# Patient Record
Sex: MALE | Race: WHITE | Employment: FULL TIME | URBAN - METROPOLITAN AREA
[De-identification: names, ages, dates, MRNs, and addresses within clinical notes are randomized per-mention and may not be internally consistent; named-entity substitution may affect disease eponyms.]

---

## 2019-01-17 LAB
CHOLESTEROL, TOTAL: 196 MG/DL
CHOLESTEROL/HDL RATIO: 4.4
CREATININE: 1 MG/DL
HDLC SERPL-MCNC: 45 MG/DL (ref 35–70)
LDL CHOLESTEROL CALCULATED: 131 MG/DL (ref 0–160)
POTASSIUM (K+): 4.1
TRIGL SERPL-MCNC: 101 MG/DL
VLDLC SERPL CALC-MCNC: NORMAL MG/DL

## 2019-02-01 ENCOUNTER — HOSPITAL ENCOUNTER (OUTPATIENT)
Age: 59
Discharge: HOME | End: 2019-02-03
Payer: COMMERCIAL

## 2019-02-01 LAB — PROSTATE SPECIFIC ANTIGEN: 3.32 NG/ML (ref 0–4)

## 2019-02-01 PROCEDURE — G0103 PSA SCREENING: HCPCS

## 2019-05-06 VITALS
TEMPERATURE: 98 F | BODY MASS INDEX: 34.36 KG/M2 | WEIGHT: 232 LBS | HEIGHT: 69 IN | SYSTOLIC BLOOD PRESSURE: 112 MMHG | DIASTOLIC BLOOD PRESSURE: 72 MMHG

## 2019-05-06 RX ORDER — SILDENAFIL CITRATE 20 MG/1
20 TABLET ORAL PRN
COMMUNITY
End: 2020-07-01 | Stop reason: ALTCHOICE

## 2019-05-06 RX ORDER — OMEPRAZOLE 40 MG/1
40 CAPSULE, DELAYED RELEASE ORAL DAILY
COMMUNITY
End: 2020-06-25 | Stop reason: SDUPTHER

## 2019-05-06 RX ORDER — ALBUTEROL SULFATE 90 UG/1
2 AEROSOL, METERED RESPIRATORY (INHALATION) EVERY 6 HOURS PRN
COMMUNITY
End: 2019-05-09

## 2019-05-06 RX ORDER — BENZONATATE 100 MG/1
100 CAPSULE ORAL 3 TIMES DAILY PRN
COMMUNITY
End: 2019-05-09

## 2019-05-06 RX ORDER — DULOXETIN HYDROCHLORIDE 60 MG/1
60 CAPSULE, DELAYED RELEASE ORAL DAILY
COMMUNITY
End: 2019-05-09

## 2019-05-06 RX ORDER — CYCLOBENZAPRINE HCL 10 MG
10 TABLET ORAL 3 TIMES DAILY PRN
COMMUNITY
End: 2019-05-09

## 2019-05-08 ENCOUNTER — TELEPHONE (OUTPATIENT)
Dept: PRIMARY CARE CLINIC | Age: 59
End: 2019-05-08

## 2019-05-09 ENCOUNTER — PROCEDURE VISIT (OUTPATIENT)
Dept: PODIATRY | Age: 59
End: 2019-05-09
Payer: COMMERCIAL

## 2019-05-09 ENCOUNTER — OFFICE VISIT (OUTPATIENT)
Dept: PRIMARY CARE CLINIC | Age: 59
End: 2019-05-09
Payer: COMMERCIAL

## 2019-05-09 VITALS
SYSTOLIC BLOOD PRESSURE: 128 MMHG | HEART RATE: 60 BPM | DIASTOLIC BLOOD PRESSURE: 64 MMHG | BODY MASS INDEX: 32.19 KG/M2 | WEIGHT: 218 LBS | OXYGEN SATURATION: 99 %

## 2019-05-09 VITALS
HEIGHT: 69 IN | WEIGHT: 221 LBS | BODY MASS INDEX: 32.73 KG/M2 | DIASTOLIC BLOOD PRESSURE: 70 MMHG | SYSTOLIC BLOOD PRESSURE: 102 MMHG | TEMPERATURE: 97.3 F

## 2019-05-09 DIAGNOSIS — M79.674 PAIN IN TOE OF RIGHT FOOT: ICD-10-CM

## 2019-05-09 DIAGNOSIS — Z00.00 HEALTH MAINTENANCE EXAMINATION: ICD-10-CM

## 2019-05-09 DIAGNOSIS — K21.9 GASTROESOPHAGEAL REFLUX DISEASE WITHOUT ESOPHAGITIS: Primary | ICD-10-CM

## 2019-05-09 DIAGNOSIS — E55.9 VITAMIN D DEFICIENCY: ICD-10-CM

## 2019-05-09 DIAGNOSIS — M79.675 PAIN IN TOE OF LEFT FOOT: ICD-10-CM

## 2019-05-09 DIAGNOSIS — B35.1 TINEA UNGUIUM: ICD-10-CM

## 2019-05-09 DIAGNOSIS — L60.0 INGROWN NAIL: Primary | ICD-10-CM

## 2019-05-09 DIAGNOSIS — F34.1 DYSTHYMIA: ICD-10-CM

## 2019-05-09 DIAGNOSIS — E78.2 MIXED HYPERLIPIDEMIA: ICD-10-CM

## 2019-05-09 DIAGNOSIS — R73.9 HYPERGLYCEMIA: ICD-10-CM

## 2019-05-09 DIAGNOSIS — N42.9 DISORDER OF PROSTATE: ICD-10-CM

## 2019-05-09 PROCEDURE — 99214 OFFICE O/P EST MOD 30 MIN: CPT | Performed by: FAMILY MEDICINE

## 2019-05-09 PROCEDURE — 99212 OFFICE O/P EST SF 10 MIN: CPT | Performed by: PODIATRIST

## 2019-05-09 NOTE — PROGRESS NOTES
Subjective  CC: Patient presents with hammertoe deformity of the feet, fungal toenails; pain on ambulation, limitation  of shoe gear and for podiatric care. HPI: Toe deformity and bilateral toenail fungus. Bilateral toenail fungus: Present for years. Occurred suddenly. Onset of symptoms occurred on:  New occurrence of symptoms. Rated as moderate in severity. No new activities. Pain with all styles  of shoes. (Assoc Sx). ROS:  Const: Denies constitutional symptoms. Eyes: Denies eye symptoms. Musculo: Denies musculoskeletal symptoms. Skin: Denies skin, hair and nail symptoms. Current Meds: Cymbalta 60 mg 1 by mouth every day  Terbinafine  mg 1 by mouth every day  Cyclobenzaprine HCL 10 mg 1/2 - 1 by mouth every 8 hours as needed  Sildenafil Citrate 20 mg 1- 2 (1/2-4hrs) prior to sexual activity-max 1 dose day  Ciclodan 8 % apply to toe nails every day  Prilosec 40 mg 1 by mouth every day  Benzonatate 100 mg 1 by mouth 3 times a day  Ventolin  (90 Base) mcg/Act take 2 puffs inhaled every 4-6 hours as needed  Allergies: NKDA  PMH:  Health Maintenance:  Influenza Vaccination - (2017)  Dr Wilfred Shaffer - nl exam   Medical Problems:  Ulcers - Beth Jarquin. Has had EGD  (3 ulcers, erosion of stomach). repeat . repeat   Last colonoscopy , ,; nl. had , nl. GERD  migraines - follows with Dr Maggi Rogers; typically around allergy season. started age 25. cervical DDD  overactive bladder - Dr Adelaide Arredondo spur - Dr Italia Rivera; surgery  Had measles/chicken pox  [facility]  1960 Page #2  Surgical Hx:  Glenny  Hernia repair - Rt ; Dr Joaquin Lob  Vasectomy, Vicky Sol, no changes. FH:  Father:  Non Contributory. Mother:  Non Contributory. Brother 1 yr younger chron's  Dad - CHF  71 heavy smoker  Mom - healthy  Reviewed, no changes. SH:  Marital:  - x2, getting . limited caffeine.   own [place]  Personal Habits: Cigarette Use: Does Not Smoke - occassional cigar. Alcohol: Drinks Alcoholic  Beverages Rarely. Reviewed, no changes. Objective  BP: 112/72 T: 98.0 Ht: 69\" 5'9\" Ht cm: 175.3 Wt: 232lb Wt k.235 BMI: 34.3 BSA: 2.20  Exam:  Const: Appears well. No signs of apparent distress present. Speech is clear and appropriate for age. Overall behavior is appropriate. CV: Pedal pulses: 2+ and equal bilaterally. Extremities: No edema of the lower limbs bilaterally. No  varicosities noted. No venous insufficiency noted. Capillary refill is < 2 seconds. Temperature is  warm and equal bilaterally. Musculo: Walks with a normal gait for age. Knees:  Insp/Palp: Normal to inspection and palpation. ROM: Symmetric. Calves:  Insp/Palp: . (Insp/Palp)  Ankles:  Insp/Palp: Normal to inspection and palpation. ROM: Symmetric. Feet:  ROM: Symmetric. Toes:  Insp/Palp: Great toes normal to inspection and palpation. Second toes normal to inspection  and palpation. Third toes normal to inspection and palpation. Fourth toes normal to inspection  and palpation. Fifth toes normal to inspection and palpation. Webspaces normal to inspection  and palpation. No toe ulcerations bilaterally. ROM: Symmetric. Skin: Skin warm and dry with no evidence of unusual rashes or suspicious lesions. Skin normal to  palpation overall. Abnormal curvature, adherence to matrix, crumbling upon debridement of the medial aspect of the  toenails bilaterally; discoloration of the medial aspect of the toenails bilaterally, dystrophy, elongation and  fungus of the medial aspect of the toenails bilaterally. Neuro: Epicritic sensation is intact. Proprioception is intact. Reflexes: Achilles and patellar DTRs are brisk and symmetrical. Tinel's sign is negative bilaterally. [place] Camel  1960 Page #3  Anastacia Schanz sign is negative bilaterally.   Lab Acquired: 19  Test Name Result H/L Ref Range Units  CBC W/ DIFF & PLT -- Profile --  WBC 6.7 3.8-10.8  THOUS/MCL  RBC 4.90 4. 20-5.80  MILL/MCL  HEMOGLOBIN 14.5 13.2-17.1 G/DL  HEMATOCRIT 43.8 38.5-50.0 %  MCV 89.5 80.0-100.0 FL  MCH 29.7 27.0-33.0 PG  MCHC 33.2 32.0-36.0 G/DL  RDW 13.9 11.0-15.0 %  PLATELET COUNT 937 502-033  THOUS/MCL  MPV 9.3 7.5-12.5 FL  NEUTROPHILS,ABSOLUTE 3700 4526-3600  CELLS/MCL  LYMPHOCYTES,ABSOLUTE [phone]  CELLS/MCL  MONOCYTES,ABSOLUTE 700 200-950  CELLS/MCL  EOSINOPHILS,ABSOLUTE 400   CELLS/MCL  BASOPHILS,ABSOLUTE 0 0-200  CELLS/MCL  TOTAL NEUTROPHILS,% 55 40-75 %  TOTAL LYMPHOCYTES,% 29 12-47 %  MONOCYTES,% 10 4-12 %  EOSINOPHILS,% 5 H 0-4 %  BASOPHILS,% 1 0-1 %  Relative blood cell counts (%) should be compared with  absolute cell counts (cells/mcL). Relative counts may not  be clinically meaningful if the absolute count of one or  more cell type is decreased. Reference ranges for relative cell counts derived from:  A Manual of Laboratory and Diagnostics Tests, 9th Ed,  [address] [place] [address], 2015. Pediatric Reference Intervals, 7th Ed, [facility], 2011. COMP METABOLIC PANEL W/GFR -- Profile --  SODIUM 139 135-146 MMOL/L  POTASSIUM 4.1 3.5-5.3 MMOL/L  CHLORIDE 103  MMOL/L  CARBON DIOXIDE 29 20-32 MMOL/L  Reference range for high altitude clients: 18-30 mmol/L  CALCIUM 9.3 8.6-10.3 MG/DL  ALKALINE PHOSPHATASE 54  U/L  AST 17 10-35 U/L  ALT 17 9-46 U/L  Usha Kimbrough [facility] 1960 Page #4  BILIRUBIN,TOTAL 0.8 0.2-1.2 MG/DL  GLUCOSE 108 H 65-99 MG/DL  GLUCOSE REFERENCE RANGE BASED ON FASTING SPECIMEN. UREA NITROGEN (BUN) 26 H 7-25 MG/DL  CREATININE 0.96 0.70-1.25 MG/DL  The upper reference limit for Creatinine is approximately  13% higher for people identified as -American. BUN/CREATININE RATIO 26.6 H 6-22  PROTEIN,TOTAL 6.8 6.1-8.1 G/DL  ALBUMIN 4.4 3.6-5.1 G/DL  GLOBULIN,CALCULATED 2.4 1.9-3.7 G/DL  A/G RATIO 1.8 1.0-2.5  EGFR NON-AFR.  AMERICAN 86 > OR = 60  ML/MIN/1.73M2  EGFR AFRICAN AMERICAN 99 > OR = 60  ML/MIN/1.73M2  TSH 1.45 0.40-4.50 mIU/L  LIPID PANEL -- Profile --  CHOLESTEROL 196 <199 MG/DL  HDL CHOLESTEROL 45 >40 MG/DL  TRIGLYCERIDES 101 <150 MG/DL  NON-HDL CHOLESTEROL 151 H <130 MG/DL  For patients with diabetes plus 1 major ASCVD risk factor,  treating to a non-HDL-C goal of <100 mg/dL (LDL-C of <70  mg/ dL) is considered a therapeutic option. CHOLESTEROL/HDL RATIO 4.4 <5.0 CALC  LDL CHOL,CALCULATED 131 H 0-100 MG/DL  LDL-C is now calculated using the Miguel A  calculation, which is a validated novel method providing  better accuracy than the Friedewald equation in the  estimation of LDL-C. Juan Diego MONTEZ et al.MICHEAL. 4365;889(13):7267-2708  Desirable range <100 mg/dL for primary prevention; <70  mg/dL for patients with CHD or diabetic patients  with >or= 2 CHD risk factors. For additional information, please refer to  http://education. [employer]/faq/DOD580(This link  is being provided for informational/educational purposes  only.)  URINALYSIS -- Profile --  COLOR YELLOW YELLOW  APPEARANCE SL CLOUDY A CLEAR  SPECIFIC GRAVITY 1.024 1.001-1.035  PH 6.5 5.0-8.0  GLUCOSE,QUAL NEGATIVE NEGATIVE  BILIRUBIN NEGATIVE NEGATIVE  KETONES NEGATIVE NEGATIVE  HEMOGLOBIN,QUAL NEGATIVE NEGATIVE  PROTEIN,TOTAL,QUAL NEGATIVE NEGATIVE  NITRITE NEGATIVE NEGATIVE  LEUKOCYTE ESTERASE NEGATIVE NEGATIVE  Radha Mondragon  1960 Page #5  WBC/HPF CELLS NONE SEEN 0-5  RBC/HPF CELLS NONE SEEN 0-2  SQUAMOUS EPI CELLS NONE SEEN 0-5 HPF  BACTERIA NONE SEEN NONE SEEN  HYALINE CASTS NONE SEEN NONE SEEN LPF  PSA,TOTAL 2.7 < OR = 4.0 NG/ML  The total PSA value from this assay system is standardized  against the WHO standard. The test result will be  approximately 20% lower when compared to the  equimolar-standardized total PSA (Tara Occoquan). Comparison of serial PSA results should be interpreted  with this fact in mind. This test was performed using the Siemens chemiluminescent  method. Values obtained from different assay methods  be used interchangeably.  PSA levels,

## 2019-05-09 NOTE — PROGRESS NOTES
measles/chicken pox  [facility]  Surgical Hx:  Glenny  Hernia repair - Rt ; Dr Sunil Presley  Vasectomy, T & A  Reviewed, no changes. FH:  Father:  Non Contributory. Mother:  Non Contributory. Brother 1 yr younger chron's  Dad - CHF  71 heavy smoker  Mom - healthy  Reviewed, no changes. SH:  Marital:  - x2, getting . limited caffeine. own [place]  Personal Habits: Cigarette Use: Does Not Smoke - occassional cigar. Alcohol: Drinks Alcoholic  Beverages Rarely. Reviewed, no changes. O:  Vitals:    19 0909   BP: 128/64   Pulse: 60   SpO2: 99%   Weight: 218 lb (98.9 kg)     Exam:  Const: Appears comfortable. No signs of acute distress present. Head/Face: Atraumatic, normocephalic on inspection. Eyes: EOMI in both eyes. No discharge from the eyes. PERRL. Sclerae clear. ENMT: Auditory canals normal. Tympanic membranes: intact and no effusion bilaterally. External nose  WNL. Nasal mucosa is clear. Oropharynx: No erythema or exudate. Posterior pharynx is normal.  Neck: Supple. Palpation reveals no adenopathy. No masses appreciated. No JVD. Carotids: no  bruits bilaterally. Resp: Respirations are unlabored. Clear to auscultation bilaterally. No rales, rhonchi or wheezes  appreciated over the lungs bilaterally. CV: Rate is regular. Rhythm is regular. No gallop or rubs. Extremities: No clubbing or cyanosis. No edema of the lower limbs bilaterally. No calf inflammation or tenderness  Abdomen: Bowel sounds are normoactive. Abdomen is soft, nontender, and nondistended. No  abdominal masses palpable. No palpable hepatosplenomegaly. Lymph: No palpable or visible regional lymphadenopathy. Musculo: Walks with a normal gait. Upper Extremities: Normal to inspection and palpation. Skin: Dry and warm with no rash. Skin normal to inspection and palpation overall. Neuro: Alert and oriented. Affect: appropriate. Upper Extremities: motor strength is 5/5 bilaterally.   Lower Extremities: motor strength is 5/5 bilaterally. Reflexes: DTR's are symmetric and 2+ bilaterally. .  (Tandem Walk) . (Romberg's Test)  Cranial Nerves: Cranial nerves grossly intact. He defers /Rectal to [place]      1. Gastroesophageal reflux disease without esophagitis  Patient completely asymptomatic, follows with Dr. Susi Rinne. Taking PPI with risks benefits reviewed including masking underlying etiologies, risk of calcium else works and renal insufficiency, C. diff and otherwise. Had endoscopy about one year ago. 2. Dysthymia  Stable and asymptomatic on medication. Tolerating well. Undergoing counseling as well. 3. Disorder of prostate  A symptomatically. Following with Dr. Corina Ferrer, recently saw. PSA went from 2.1 2.7-defers exam.    4. Health maintenance examination  Encourage yearly physicals. He will schedule for this about 3 months  - HEMOGLOBIN A1C; Future    5. Vitamin D deficiency  Minimally deficient at about 24. Appropriate supplementation reviewed. 6. Mixed hyperlipidemia  Mildly elevated. Recheck panel.  - Lipid Panel; Future     7. Hyperglycemia  Mild at 108. Repeat. Likely much better after lifestyle modification. Monitor  - HEMOGLOBIN A1C; Future      Plan as above. Follow-up in about 3 months for physical sooner when necessary. Check with insurance before any testing. Signs and symptoms to watch for discussed. Serious signs and symptoms reviewed. ER if any.

## 2019-05-10 ENCOUNTER — HOSPITAL ENCOUNTER (OUTPATIENT)
Age: 59
Discharge: HOME | End: 2019-05-12

## 2019-05-10 DIAGNOSIS — Z00.00 HEALTH MAINTENANCE EXAMINATION: ICD-10-CM

## 2019-05-10 DIAGNOSIS — R73.9 HYPERGLYCEMIA: ICD-10-CM

## 2019-05-10 DIAGNOSIS — E78.2 MIXED HYPERLIPIDEMIA: ICD-10-CM

## 2019-05-10 LAB
CHOLESTEROL, TOTAL: 153 MG/DL (ref 0–199)
HBA1C MFR BLD: 5.4 % (ref 4–5.6)
HDLC SERPL-MCNC: 45 MG/DL
LDL CHOLESTEROL CALCULATED: 96 MG/DL (ref 0–99)
TRIGL SERPL-MCNC: 59 MG/DL (ref 0–149)
VLDLC SERPL CALC-MCNC: 12 MG/DL

## 2019-05-10 PROCEDURE — 83036 HEMOGLOBIN GLYCOSYLATED A1C: CPT

## 2019-05-10 PROCEDURE — 36415 COLL VENOUS BLD VENIPUNCTURE: CPT

## 2019-05-10 PROCEDURE — 80061 LIPID PANEL: CPT

## 2019-07-08 RX ORDER — DULOXETIN HYDROCHLORIDE 60 MG/1
60 CAPSULE, DELAYED RELEASE ORAL DAILY
Qty: 30 CAPSULE | Refills: 1 | Status: SHIPPED | OUTPATIENT
Start: 2019-07-08 | End: 2020-02-03 | Stop reason: SDUPTHER

## 2019-07-08 RX ORDER — DULOXETIN HYDROCHLORIDE 60 MG/1
60 CAPSULE, DELAYED RELEASE ORAL DAILY
COMMUNITY
End: 2019-07-08 | Stop reason: SDUPTHER

## 2019-07-11 ENCOUNTER — PROCEDURE VISIT (OUTPATIENT)
Dept: PODIATRY | Age: 59
End: 2019-07-11
Payer: COMMERCIAL

## 2019-07-11 VITALS
TEMPERATURE: 98 F | WEIGHT: 222 LBS | DIASTOLIC BLOOD PRESSURE: 72 MMHG | SYSTOLIC BLOOD PRESSURE: 120 MMHG | BODY MASS INDEX: 32.78 KG/M2

## 2019-07-11 DIAGNOSIS — M79.675 PAIN IN LEFT TOE(S): ICD-10-CM

## 2019-07-11 DIAGNOSIS — M79.674 PAIN IN TOE OF RIGHT FOOT: ICD-10-CM

## 2019-07-11 DIAGNOSIS — B35.1 TINEA UNGUIUM: ICD-10-CM

## 2019-07-11 DIAGNOSIS — L60.0 INGROWN NAIL: Primary | ICD-10-CM

## 2019-07-11 PROCEDURE — 99212 OFFICE O/P EST SF 10 MIN: CPT | Performed by: PODIATRIST

## 2019-07-11 ASSESSMENT — ENCOUNTER SYMPTOMS
RESPIRATORY NEGATIVE: 1
EYES NEGATIVE: 1

## 2019-10-10 ENCOUNTER — PROCEDURE VISIT (OUTPATIENT)
Dept: PODIATRY | Age: 59
End: 2019-10-10
Payer: COMMERCIAL

## 2019-10-10 ENCOUNTER — TELEPHONE (OUTPATIENT)
Dept: PRIMARY CARE CLINIC | Age: 59
End: 2019-10-10

## 2019-10-10 VITALS
DIASTOLIC BLOOD PRESSURE: 78 MMHG | BODY MASS INDEX: 35.15 KG/M2 | SYSTOLIC BLOOD PRESSURE: 123 MMHG | WEIGHT: 238 LBS | TEMPERATURE: 97.8 F

## 2019-10-10 DIAGNOSIS — L60.0 INGROWN NAIL: Primary | ICD-10-CM

## 2019-10-10 DIAGNOSIS — M79.674 PAIN IN TOE OF RIGHT FOOT: ICD-10-CM

## 2019-10-10 DIAGNOSIS — M79.675 PAIN IN LEFT TOE(S): ICD-10-CM

## 2019-10-10 DIAGNOSIS — B35.1 TINEA UNGUIUM: ICD-10-CM

## 2019-10-10 PROCEDURE — 99212 OFFICE O/P EST SF 10 MIN: CPT | Performed by: PODIATRIST

## 2020-01-15 ENCOUNTER — PROCEDURE VISIT (OUTPATIENT)
Dept: PODIATRY | Age: 60
End: 2020-01-15
Payer: COMMERCIAL

## 2020-01-15 VITALS
SYSTOLIC BLOOD PRESSURE: 124 MMHG | TEMPERATURE: 97.8 F | DIASTOLIC BLOOD PRESSURE: 78 MMHG | BODY MASS INDEX: 35.7 KG/M2 | HEIGHT: 69 IN | WEIGHT: 241 LBS

## 2020-01-15 PROCEDURE — 99212 OFFICE O/P EST SF 10 MIN: CPT | Performed by: PODIATRIST

## 2020-01-15 NOTE — PROGRESS NOTES
occassional cigar. Alcohol: Drinks Alcoholic  Beverages Rarely. Reviewed, no changes. Objective  BP: 112/72 T: 98.0 Ht: 69\" 5'9\" Ht cm: 175.3 Wt: 232lb Wt k.235 BMI: 34.3 BSA: 2.20  Exam:  Const: Appears well. No signs of apparent distress present. Speech is clear and appropriate for age. Overall behavior is appropriate. CV: Pedal pulses: 2+ and equal bilaterally. Extremities: No edema of the lower limbs bilaterally. No  varicosities noted. No venous insufficiency noted. Capillary refill is < 2 seconds. Temperature is  warm and equal bilaterally. Musculo: Walks with a normal gait for age. Knees:  Insp/Palp: Normal to inspection and palpation. ROM: Symmetric. Calves:  Insp/Palp: . (Insp/Palp)  Ankles:  Insp/Palp: Normal to inspection and palpation. ROM: Symmetric. Feet:  ROM: Symmetric. Toes:  Insp/Palp: Great toes normal to inspection and palpation. Second toes normal to inspection  and palpation. Third toes normal to inspection and palpation. Fourth toes normal to inspection  and palpation. Fifth toes normal to inspection and palpation. Webspaces normal to inspection  and palpation. No toe ulcerations bilaterally. ROM: Symmetric. Skin: Skin warm and dry with no evidence of unusual rashes or suspicious lesions. Skin normal to  palpation overall. Abnormal curvature, adherence to matrix, crumbling upon debridement of the medial aspect of the  toenails bilaterally; discoloration of the medial aspect of the toenails bilaterally, dystrophy, elongation and  fungus of the medial aspect of the toenails bilaterally. Neuro: Epicritic sensation is intact. Proprioception is intact. Reflexes: Achilles and patellar DTRs are brisk and symmetrical. Tinel's sign is negative bilaterally. Sherrill Mendez  1960 Page #3  Tommye Regla sign is negative bilaterally.   Lab Acquired: 19  Test Name Result H/L Ref Range Units  CBC W/ DIFF & PLT -- Profile --  WBC 6.7 3.8-10.8  THOUS/MCL  RBC 4.90 4. 20-5.80  MILL/MCL  HEMOGLOBIN 14.5 13.2-17.1 G/DL  HEMATOCRIT 43.8 38.5-50.0 %  MCV 89.5 80.0-100.0 FL  MCH 29.7 27.0-33.0 PG  MCHC 33.2 32.0-36.0 G/DL  RDW 13.9 11.0-15.0 %  PLATELET COUNT 212 769-928  THOUS/MCL  MPV 9.3 7.5-12.5 FL  NEUTROPHILS,ABSOLUTE 3700 0977-5671  CELLS/MCL  LYMPHOCYTES,ABSOLUTE [phone]  CELLS/MCL  MONOCYTES,ABSOLUTE 700 200-950  CELLS/MCL  EOSINOPHILS,ABSOLUTE 400   CELLS/MCL  BASOPHILS,ABSOLUTE 0 0-200  CELLS/MCL  TOTAL NEUTROPHILS,% 55 40-75 %  TOTAL LYMPHOCYTES,% 29 12-47 %  MONOCYTES,% 10 4-12 %  EOSINOPHILS,% 5 H 0-4 %  BASOPHILS,% 1 0-1 %  Relative blood cell counts (%) should be compared with  absolute cell counts (cells/mcL). Relative counts may not  be clinically meaningful if the absolute count of one or  more cell type is decreased. Reference ranges for relative cell counts derived from:  A Manual of Laboratory and Diagnostics Tests, 9th Ed,  [address] [place] [address], 2015. Pediatric Reference Intervals, 7th Ed, [facility], 2011. COMP METABOLIC PANEL W/GFR -- Profile --  SODIUM 139 135-146 MMOL/L  POTASSIUM 4.1 3.5-5.3 MMOL/L  CHLORIDE 103  MMOL/L  CARBON DIOXIDE 29 20-32 MMOL/L  Reference range for high altitude clients: 18-30 mmol/L  CALCIUM 9.3 8.6-10.3 MG/DL  ALKALINE PHOSPHATASE 54  U/L  AST 17 10-35 U/L  ALT 17 9-46 U/L  Magda Hinojosa  1960 Page #4  BILIRUBIN,TOTAL 0.8 0.2-1.2 MG/DL  GLUCOSE 108 H 65-99 MG/DL  GLUCOSE REFERENCE RANGE BASED ON FASTING SPECIMEN. UREA NITROGEN (BUN) 26 H 7-25 MG/DL  CREATININE 0.96 0.70-1.25 MG/DL  The upper reference limit for Creatinine is approximately  13% higher for people identified as -American. BUN/CREATININE RATIO 26.6 H 6-22  PROTEIN,TOTAL 6.8 6.1-8.1 G/DL  ALBUMIN 4.4 3.6-5.1 G/DL  GLOBULIN,CALCULATED 2.4 1.9-3.7 G/DL  A/G RATIO 1.8 1.0-2.5  EGFR NON-AFR.  AMERICAN 86 > OR = 60  ML/MIN/1.73M2  EGFR AFRICAN AMERICAN 99 > OR = 60  ML/MIN/1.73M2  TSH 1.45 0.40-4.50 mIU/L  LIPID PANEL -- regardless of value,  should not be interpreted as absolute evidence of the  presence or absence of disease. VITAMIN D,25-HYDROXY,TOTAL,IMMUNOASSAY 24 L  NG/ML  Vitamin D Status 25-OH Vitamin D:  Deficiency: <20 ng/mL  Insufficiency: 20-29 ng/mL  Optimal: > or = 30 ng/mL  For 25-OH Vitamin D testing on patients on  D2-supplementation and patients for whom quantitation of D2  and D3 fractions is required, the QuestAssureD 25-OH Vit D,  (D2,D3),LC/MS/MS is recommended: Order code 84741 (patients  >2 yrs). Encounter Diagnoses   Name Primary?     Ingrown nail Yes    Tinea unguium     Pain in left toe(s)     Pain in toe of right foot      Care Plan:  Med Current :  Ciclodan 8 % apply to toe nails every day  Patient is off the Lamisil work and continue with the Penlac today I divided both medial and lateral border of the right hallux nail application of antibiotic patient is to monitor with antibiotic ×3 days report in 2 months

## 2020-02-03 RX ORDER — DULOXETIN HYDROCHLORIDE 60 MG/1
60 CAPSULE, DELAYED RELEASE ORAL DAILY
Qty: 30 CAPSULE | Refills: 1 | Status: SHIPPED
Start: 2020-02-03 | End: 2020-06-25 | Stop reason: SDUPTHER

## 2020-04-21 RX ORDER — ETODOLAC 400 MG/1
400 TABLET, FILM COATED ORAL 2 TIMES DAILY
Qty: 120 TABLET | Refills: 0 | Status: SHIPPED
Start: 2020-04-21 | End: 2020-08-17

## 2020-05-15 ENCOUNTER — HOSPITAL ENCOUNTER (OUTPATIENT)
Age: 60
Discharge: HOME | End: 2020-05-17

## 2020-05-15 LAB — PROSTATE SPECIFIC ANTIGEN: 3.1 NG/ML (ref 0–4)

## 2020-05-15 PROCEDURE — 84153 ASSAY OF PSA TOTAL: CPT

## 2020-06-25 RX ORDER — DULOXETIN HYDROCHLORIDE 60 MG/1
60 CAPSULE, DELAYED RELEASE ORAL DAILY
Qty: 30 CAPSULE | Refills: 0 | Status: SHIPPED
Start: 2020-06-25 | End: 2020-08-06 | Stop reason: SDUPTHER

## 2020-06-25 RX ORDER — OMEPRAZOLE 40 MG/1
40 CAPSULE, DELAYED RELEASE ORAL DAILY
Qty: 30 CAPSULE | Refills: 0 | Status: SHIPPED
Start: 2020-06-25 | End: 2020-08-06 | Stop reason: SDUPTHER

## 2020-07-01 ENCOUNTER — OFFICE VISIT (OUTPATIENT)
Dept: PRIMARY CARE CLINIC | Age: 60
End: 2020-07-01
Payer: COMMERCIAL

## 2020-07-01 VITALS
WEIGHT: 244 LBS | TEMPERATURE: 97.9 F | OXYGEN SATURATION: 96 % | HEART RATE: 67 BPM | RESPIRATION RATE: 16 BRPM | DIASTOLIC BLOOD PRESSURE: 62 MMHG | BODY MASS INDEX: 36.03 KG/M2 | SYSTOLIC BLOOD PRESSURE: 112 MMHG

## 2020-07-01 PROBLEM — N42.9 PROSTATE DISORDER: Status: ACTIVE | Noted: 2020-07-01

## 2020-07-01 PROBLEM — E55.9 VITAMIN D DEFICIENCY: Status: ACTIVE | Noted: 2020-07-01

## 2020-07-01 PROBLEM — Z00.00 ANNUAL PHYSICAL EXAM: Status: ACTIVE | Noted: 2020-07-01

## 2020-07-01 PROBLEM — R73.9 HYPERGLYCEMIA: Status: ACTIVE | Noted: 2020-07-01

## 2020-07-01 PROBLEM — E78.2 MIXED HYPERLIPIDEMIA: Status: ACTIVE | Noted: 2020-07-01

## 2020-07-01 PROCEDURE — 93000 ELECTROCARDIOGRAM COMPLETE: CPT | Performed by: FAMILY MEDICINE

## 2020-07-01 PROCEDURE — 99396 PREV VISIT EST AGE 40-64: CPT | Performed by: FAMILY MEDICINE

## 2020-07-01 ASSESSMENT — PATIENT HEALTH QUESTIONNAIRE - PHQ9
SUM OF ALL RESPONSES TO PHQ9 QUESTIONS 1 & 2: 0
2. FEELING DOWN, DEPRESSED OR HOPELESS: 0
SUM OF ALL RESPONSES TO PHQ QUESTIONS 1-9: 0
1. LITTLE INTEREST OR PLEASURE IN DOING THINGS: 0
SUM OF ALL RESPONSES TO PHQ QUESTIONS 1-9: 0

## 2020-07-01 NOTE — PROGRESS NOTES
20  Beni Choe : 1960 Sex: male  Age: 62 y.o. Chief Complaint   Patient presents with    Annual Exam    Discuss Labs     needs orders- had psa done through urology       HPI:   He is here for follow-up. Overall he feels well. States he had a prostate exam and a PSA through urology in May. Last blood work reviewed. No complaints or concerns at this time. Most Recent Labs  CBC  No results found for: WBC, RBC, HGB, HCT, MCV, PLT   CMP  Lab Results   Component Value Date    K 4.1 2019    CREATININE 1.0 2019     A1C  Lab Results   Component Value Date    LABA1C 5.4 05/10/2019     TSH  No results found for: TSH  FREET4  No results found for: Q2XFMGI  LIPID  Lab Results   Component Value Date    CHOL 153 05/10/2019    CHOL 196 2019    HDL 45 05/10/2019    HDL 45 2019    LDLCALC 96 05/10/2019    LDLCALC 131 2019    TRIG 59 05/10/2019    TRIG 101 2019    CHOLHDLRATIO 4.4 2019     VITAMIN D  No results found for: VITD25  MAGNESIUM  No results found for: MG   PHOS  No results found for: PHOS   ASHU   No results found for: ASHU  RHEUMATOID FACTOR  No results found for: RF  PSA  Lab Results   Component Value Date    PSA 3.10 05/15/2020    PSA 3.32 2019    PSA 2.2 2016      HEPATITIS C  No results found for: HCVABI  HIV  No results found for: TMT4UWX, HIV1QT  UA  No results found for: COLORU, CLARITYU, GLUCOSEU, BILIRUBINUR, KETUA, SPECGRAV, BLOODU, PHUR, PROTEINU, UROBILINOGEN, NITRU, LEUKOCYTESUR  Urine Micro/Albumin Ratio  No results found for: MALBCR      Review of Systems  ROS:  Const: Denies chills, fever, malaise and sweats. Eyes: Denies discharge, pain, redness and visual disturbance. ENMT: Denies earaches, other ear symptoms. Denies nasal or sinus symptoms other than stated  above. Denies mouth and tongue lesions and sore throat.   CV: Denies chest discomfort, pain; diaphoresis, dizziness, edema, lightheadedness, orthopnea,  palpitations, syncope and near syncopal episode or any exertional symptoms  Resp: Denies cough, hemoptysis, pleuritic pain, SOB, sputum production and wheezing. GI: Denies abdominal pain, change in bowel habits, hematochezia, melena, nausea and vomiting. : Denies urinary symptoms including dysuria , urgency, frequency or hematuria. Musculo: Denies musculoskeletal symptoms. Skin: Denies bruising and rash. Neuro: Denies headache, numbness, stiff neck, tingling and focal weakness slurred speech or facial  droop  Hema/Lymph: Denies bleeding/bruising tendency and enlarged lymph nodes      Health Maintenance: Well balanced/proper diet reviewed. Counseled on MVI, fiber, b-complex,  calcium and fish oils (including pros/cons of OTC vs Rx). Exercise recommendations reviewed. Reviewed recommendations regarding Td (Tdap) (9/13), pneumovax/prevnar 13 (Declines) , flu  shot (seasonal), Hepatitis vaccines and shingles vaccine (shingrix reviewed). Importance of Regular Eye  and Dental exams reviewed. Risks/Benefits of ASA reviewed and discussed current  recommendations. Caffeine risks/limits and Sun protection reviewed. Notify if any new or changing  moles/skin lesions etc. Dexa Scan indications/ risk factors for osteoportic fractures and prevention  reviewed. Counseled on testicular exams, prostate exams. Colonoscopy recommendations  reviewed. 4/13 , 1/17 Yosseff. Pt denies change in bowel [facility] or [facility] of colon polyps/CA. Heme Cards provided. EKG done and reviewed with pt. Declines further cardiac testing including referral, Stress test, 2D  echo, etc. Reviewed indications for PFT's. Also counseled on indications for imaging.  Pt declines  further imaging including Brain, carotid, chest, Abdominal, Aortic or otherwise      Current Outpatient Medications:     DULoxetine (CYMBALTA) 60 MG extended release capsule, Take 1 capsule by mouth daily, Disp: 30 capsule, Rfl: 0    omeprazole (PRILOSEC) 40 MG delayed release capsule, Take 1 capsule by mouth daily, Disp: 30 capsule, Rfl: 0    ciclopirox (PENLAC) 8 % solution, Apply topically nightly Apply topically nightly., Disp: 6 mL, Rfl: 3    etodolac (LODINE) 400 MG tablet, Take 1 tablet by mouth 2 times daily (Patient not taking: Reported on 2020), Disp: 120 tablet, Rfl: 0     Allergies   Allergen Reactions    Prednisone Anxiety       Past Medical History:   Diagnosis Date    Disorder of prostate     Dysthymic disorder     GERD (gastroesophageal reflux disease)     Migraine     Tinea unguium     Vitamin D deficiency      Past Surgical History:   Procedure Laterality Date    CHOLECYSTECTOMY, LAPAROSCOPIC      HERNIA REPAIR       No family history on file. Social History     Tobacco Use    Smoking status: Never Smoker    Smokeless tobacco: Never Used   Substance Use Topics    Alcohol use: No    Drug use: No      Social History     Social History Narrative    PMH:    Health Maintenance:    Influenza Vaccination - (2017)    Dr Júnior Michelle - nl exam     Medical Problems:    Ulcers - [place]. Has had EGD  (3 ulcers, erosion of stomach). repeat . repeat     Last colonoscopy , ,; nl. had , nl. GERD    migraines - follows with Dr Estella Maurer; typically around allergy season. started age 25. cervical DDD    overactive bladder - Dr Louisa Simmons spur - Dr Nicolas Yu; surgery    Had measles/chicken pox    [facility]    Surgical Hx:    Glenny    Hernia repair - Rt ; Dr Rosas Devoid    Vasectomy, T & A    Reviewed, no changes. FH:    Father:    Non Contributory. Mother:    Non Contributory. Brother 1 yr younger chron's    Dad - CHF  71 heavy smoker    Mom - healthy    Reviewed, no changes. SH:    Marital:  - x2, getting . limited caffeine. own [place]    Personal Habits: Cigarette Use: Does Not Smoke - occassional cigar. Alcohol: Drinks Alcoholic    Beverages Rarely.         Vitals:    20 0856   BP: 112/62   Pulse: 67   Resp: 16 TSH without Reflex    Comprehensive Metabolic Panel    CBC Auto Differential    Urinalysis   2. Hyperglycemia  Hemoglobin A1C   3. Mixed hyperlipidemia     4. Prostate disorder     5. Vitamin D deficiency  Vitamin D 25 Hydroxy          No problem-specific Assessment & Plan notes found for this encounter. 1. Gastroesophageal reflux disease without esophagitis  Patient completely asymptomatic, follows with Dr. Saintclair Billings. Taking PPI with risks benefits reviewed including masking underlying etiologies, risk of calcium else works and renal insufficiency, C. diff and otherwise. Had endoscopy around 2018    2. Dysthymia  Stable and asymptomatic on medication. Tolerating well. Risk benefits and ADRs reviewed. Appropriate counseling reviewed. Adamantly denies SI/HI.     3. Disorder of prostate  ASX. Following with Dr. Oren Castro,, previous PSA went from 2.1 2.7-he saw urology and had PSA and exam 5/20. .     4. Health maintenance examination  Health maintenance issues discussed at length as above, encouraged yearly physicals    5. Vitamin D deficiency  Minimally deficient at about 24 in the past. Appropriate supplementation reviewed. Await repeat     6. Mixed hyperlipidemia  Mildly elevated in the past. Recheck panel. Risk of hyperlipidemia reviewed. Lifestyle modification reviewed. 7. Hyperglycemia  Mild in the past. Repeat. Lifestyle modification reviewed. Monitor. Plan as above. Counseled extensively and differential diagnoses relevant to above were reviewed, including serious etiologies. Side effects and interactions of medications were reviewed. Counseled. He will get blood work call for results follow-up if abnormal as needed, continue per specialist, otherwise symptoms to follow-up in 1 year sooner as needed        As long as symptoms steadily improve/resolve, and medical conditions follow the expected course, FU as below, sooner PRN. No follow-ups on file.         Signs and symptoms to watch for discussed, serious signs and symptoms reviewed. ER if any. Johnathan Montaño MD    Patients are advised to check with insurance company to ensure coverage and to fully understand benefits and cost prior to any testing. This note was created with the assistance of voice recognition software. Document was reviewed however may contain grammatical errors.

## 2020-07-31 PROBLEM — Z00.00 ANNUAL PHYSICAL EXAM: Status: RESOLVED | Noted: 2020-07-01 | Resolved: 2020-07-31

## 2020-08-06 RX ORDER — OMEPRAZOLE 40 MG/1
40 CAPSULE, DELAYED RELEASE ORAL DAILY
Qty: 30 CAPSULE | Refills: 12 | Status: SHIPPED
Start: 2020-08-06 | End: 2021-08-16 | Stop reason: SDUPTHER

## 2020-08-06 RX ORDER — DULOXETIN HYDROCHLORIDE 60 MG/1
60 CAPSULE, DELAYED RELEASE ORAL DAILY
Qty: 30 CAPSULE | Refills: 12 | Status: SHIPPED
Start: 2020-08-06 | End: 2021-08-16 | Stop reason: SDUPTHER

## 2020-08-06 NOTE — TELEPHONE ENCOUNTER
Pt requesting a refill for duloxetine 60 mg qd, and omeprazole 40 mg qd. Dose and sig verified with pt.

## 2020-08-07 ENCOUNTER — HOSPITAL ENCOUNTER (OUTPATIENT)
Age: 60
Discharge: HOME | End: 2020-08-09
Payer: COMMERCIAL

## 2020-08-07 LAB
ALBUMIN SERPL-MCNC: 4.1 G/DL (ref 3.5–5.2)
ALP BLD-CCNC: 58 U/L (ref 40–129)
ALT SERPL-CCNC: 31 U/L (ref 0–40)
ANION GAP SERPL CALCULATED.3IONS-SCNC: 15 MMOL/L (ref 7–16)
AST SERPL-CCNC: 23 U/L (ref 0–39)
BASOPHILS ABSOLUTE: 0.05 E9/L (ref 0–0.2)
BASOPHILS RELATIVE PERCENT: 0.9 % (ref 0–2)
BILIRUB SERPL-MCNC: 0.5 MG/DL (ref 0–1.2)
BILIRUBIN URINE: NEGATIVE
BLOOD, URINE: NEGATIVE
BUN BLDV-MCNC: 22 MG/DL (ref 8–23)
CALCIUM SERPL-MCNC: 9.3 MG/DL (ref 8.6–10.2)
CHLORIDE BLD-SCNC: 103 MMOL/L (ref 98–107)
CHOLESTEROL, TOTAL: 185 MG/DL (ref 0–199)
CLARITY: CLEAR
CO2: 23 MMOL/L (ref 22–29)
COLOR: YELLOW
CREAT SERPL-MCNC: 1 MG/DL (ref 0.7–1.2)
EOSINOPHILS ABSOLUTE: 0.19 E9/L (ref 0.05–0.5)
EOSINOPHILS RELATIVE PERCENT: 3.5 % (ref 0–6)
GFR AFRICAN AMERICAN: >60
GFR NON-AFRICAN AMERICAN: >60 ML/MIN/1.73
GLUCOSE BLD-MCNC: 115 MG/DL (ref 74–99)
GLUCOSE URINE: NEGATIVE MG/DL
HBA1C MFR BLD: 6.1 % (ref 4–5.6)
HCT VFR BLD CALC: 43.7 % (ref 37–54)
HDLC SERPL-MCNC: 42 MG/DL
HEMOGLOBIN: 14 G/DL (ref 12.5–16.5)
IMMATURE GRANULOCYTES #: 0.02 E9/L
IMMATURE GRANULOCYTES %: 0.4 % (ref 0–5)
KETONES, URINE: NEGATIVE MG/DL
LDL CHOLESTEROL CALCULATED: 126 MG/DL (ref 0–99)
LEUKOCYTE ESTERASE, URINE: NEGATIVE
LYMPHOCYTES ABSOLUTE: 1.61 E9/L (ref 1.5–4)
LYMPHOCYTES RELATIVE PERCENT: 29.9 % (ref 20–42)
MCH RBC QN AUTO: 29.2 PG (ref 26–35)
MCHC RBC AUTO-ENTMCNC: 32 % (ref 32–34.5)
MCV RBC AUTO: 91.2 FL (ref 80–99.9)
MONOCYTES ABSOLUTE: 0.68 E9/L (ref 0.1–0.95)
MONOCYTES RELATIVE PERCENT: 12.6 % (ref 2–12)
NEUTROPHILS ABSOLUTE: 2.83 E9/L (ref 1.8–7.3)
NEUTROPHILS RELATIVE PERCENT: 52.7 % (ref 43–80)
NITRITE, URINE: NEGATIVE
PDW BLD-RTO: 13.2 FL (ref 11.5–15)
PH UA: 6 (ref 5–9)
PLATELET # BLD: 220 E9/L (ref 130–450)
PMV BLD AUTO: 11 FL (ref 7–12)
POTASSIUM SERPL-SCNC: 4.1 MMOL/L (ref 3.5–5)
PROTEIN UA: NEGATIVE MG/DL
RBC # BLD: 4.79 E12/L (ref 3.8–5.8)
SODIUM BLD-SCNC: 141 MMOL/L (ref 132–146)
SPECIFIC GRAVITY UA: 1.02 (ref 1–1.03)
TOTAL PROTEIN: 6.7 G/DL (ref 6.4–8.3)
TRIGL SERPL-MCNC: 87 MG/DL (ref 0–149)
TSH SERPL DL<=0.05 MIU/L-ACNC: 1.66 UIU/ML (ref 0.27–4.2)
UROBILINOGEN, URINE: 0.2 E.U./DL
VITAMIN D 25-HYDROXY: 27 NG/ML (ref 30–100)
VLDLC SERPL CALC-MCNC: 17 MG/DL
WBC # BLD: 5.4 E9/L (ref 4.5–11.5)

## 2020-08-07 PROCEDURE — 80061 LIPID PANEL: CPT

## 2020-08-07 PROCEDURE — 80053 COMPREHEN METABOLIC PANEL: CPT

## 2020-08-07 PROCEDURE — 36415 COLL VENOUS BLD VENIPUNCTURE: CPT

## 2020-08-07 PROCEDURE — 84443 ASSAY THYROID STIM HORMONE: CPT

## 2020-08-07 PROCEDURE — 83036 HEMOGLOBIN GLYCOSYLATED A1C: CPT

## 2020-08-07 PROCEDURE — 82306 VITAMIN D 25 HYDROXY: CPT

## 2020-08-07 PROCEDURE — 81003 URINALYSIS AUTO W/O SCOPE: CPT

## 2020-08-07 PROCEDURE — 85025 COMPLETE CBC W/AUTO DIFF WBC: CPT

## 2020-08-07 NOTE — RESULT ENCOUNTER NOTE
Sugar low borderline, emphasize low-carb low sugar diet. I would like to discuss with him the consideration of metformin. Follow-up to review more detail sooner as needed.   Video visit acceptable if he wishes

## 2020-08-17 ENCOUNTER — VIRTUAL VISIT (OUTPATIENT)
Dept: PRIMARY CARE CLINIC | Age: 60
End: 2020-08-17
Payer: COMMERCIAL

## 2020-08-17 PROBLEM — B35.1 TINEA UNGUIUM: Status: RESOLVED | Noted: 2019-05-09 | Resolved: 2020-08-17

## 2020-08-17 PROBLEM — E88.810 METABOLIC SYNDROME: Status: ACTIVE | Noted: 2020-08-17

## 2020-08-17 PROBLEM — E66.812 CLASS 2 OBESITY DUE TO EXCESS CALORIES WITHOUT SERIOUS COMORBIDITY WITH BODY MASS INDEX (BMI) OF 36.0 TO 36.9 IN ADULT: Status: ACTIVE | Noted: 2020-08-17

## 2020-08-17 PROBLEM — M79.675 PAIN IN TOE OF LEFT FOOT: Status: RESOLVED | Noted: 2019-05-09 | Resolved: 2020-08-17

## 2020-08-17 PROBLEM — E66.09 CLASS 2 OBESITY DUE TO EXCESS CALORIES WITHOUT SERIOUS COMORBIDITY WITH BODY MASS INDEX (BMI) OF 36.0 TO 36.9 IN ADULT: Status: ACTIVE | Noted: 2020-08-17

## 2020-08-17 PROBLEM — L60.0 INGROWN NAIL: Status: RESOLVED | Noted: 2019-05-09 | Resolved: 2020-08-17

## 2020-08-17 PROBLEM — F32.A ANXIETY AND DEPRESSION: Status: ACTIVE | Noted: 2020-08-17

## 2020-08-17 PROBLEM — E88.81 METABOLIC SYNDROME: Status: ACTIVE | Noted: 2020-08-17

## 2020-08-17 PROBLEM — F41.9 ANXIETY AND DEPRESSION: Status: ACTIVE | Noted: 2020-08-17

## 2020-08-17 PROBLEM — M79.675 PAIN IN LEFT TOE(S): Status: RESOLVED | Noted: 2019-07-11 | Resolved: 2020-08-17

## 2020-08-17 PROCEDURE — 99214 OFFICE O/P EST MOD 30 MIN: CPT | Performed by: FAMILY MEDICINE

## 2020-08-17 RX ORDER — METFORMIN HYDROCHLORIDE 500 MG/1
500 TABLET, EXTENDED RELEASE ORAL
Qty: 30 TABLET | Refills: 3 | Status: SHIPPED
Start: 2020-08-17 | End: 2021-08-16

## 2020-08-17 NOTE — PROGRESS NOTES
TeleMedicine Patient Consent    This visit was performed as a virtual video visit using a synchronous, two-way, audio-video telehealth technology platform. Patient identification was verified at the start of the visit, including the patient's telephone number and physical location. I discussed with the patient the nature of our telehealth visits, that:     1. Due to the nature of an audio- video modality, the only components of a physical exam that could be done are the elements supported by direct observation. 2. I would evaluate the patient and recommend diagnostics and treatments based on my assessment. 3. If it was felt that the patient should be evaluated in clinic or an emergency room setting, then they would be directed there. 4. Our sessions are not being recorded and that personal health information is protected. 5. Our team would provide follow up care in person if/when the patient needs it. Patient does agree to proceed with telemedicine consultation. Patient's location: home address in [facility]    Physician  location other address in [place]     Other people involved in call:   None    This visit was completed virtually using Doxy. me    2020    TELEHEALTH EVALUATION -- Audio/Visual (During HGKQQ-13 public health emergency)    Chief Complaint   Patient presents with    Other     review labs           HPI:    Sangeetha Chowdhury (:  1960) has requested an audio/video evaluation for the following concern(s):    Patient presents today to follow-up on his blood work, hyperglycemia. Started his diet today, low-carb. He did see Dr. Evelin Arevalo and things checked out well there. He overall feels well. His labs are stable except his glucose 115 hemoglobin A1c 6.1  HDL 42 triglyceride 87.   Risk benefits of statin reviewed not interested but willing to take metformin with precautions, vitamin D 27, appropriate supplementation reviewed urinalysis negative    Most Recent Labs  CBC  Lab Results Component Value Date    WBC 5.4 08/07/2020    RBC 4.79 08/07/2020    HGB 14.0 08/07/2020    HCT 43.7 08/07/2020    MCV 91.2 08/07/2020     08/07/2020      CMP  Lab Results   Component Value Date     08/07/2020    K 4.1 08/07/2020    K 4.1 01/17/2019     08/07/2020    CO2 23 08/07/2020    ANIONGAP 15 08/07/2020    GLUCOSE 115 08/07/2020    BUN 22 08/07/2020    CREATININE 1.0 08/07/2020    CREATININE 1.0 01/17/2019    LABGLOM >60 08/07/2020    GFRAA >60 08/07/2020    CALCIUM 9.3 08/07/2020    PROT 6.7 08/07/2020    LABALBU 4.1 08/07/2020    BILITOT 0.5 08/07/2020    ALKPHOS 58 08/07/2020    AST 23 08/07/2020    ALT 31 08/07/2020     A1C  Lab Results   Component Value Date    LABA1C 6.1 08/07/2020    LABA1C 5.4 05/10/2019     TSH  Lab Results   Component Value Date    TSH 1.660 08/07/2020     FREET4  No results found for: B8DFLUX  LIPID  Lab Results   Component Value Date    CHOL 185 08/07/2020    CHOL 153 05/10/2019    CHOL 196 01/17/2019    HDL 42 08/07/2020    HDL 45 05/10/2019    HDL 45 01/17/2019    LDLCALC 126 08/07/2020    LDLCALC 96 05/10/2019    LDLCALC 131 01/17/2019    TRIG 87 08/07/2020    TRIG 59 05/10/2019    TRIG 101 01/17/2019    CHOLHDLRATIO 4.4 01/17/2019     VITAMIN D  Lab Results   Component Value Date    VITD25 27 08/07/2020     MAGNESIUM  No results found for: MG   PHOS  No results found for: PHOS   ASHU   No results found for: ASHU  RHEUMATOID FACTOR  No results found for: RF  PSA  Lab Results   Component Value Date    PSA 3.10 05/15/2020    PSA 3.32 02/01/2019    PSA 2.2 08/05/2016      HEPATITIS C  No results found for: HCVABI  HIV  No results found for: DNJ7MAT, HIV1QT  UA  Lab Results   Component Value Date    COLORU Yellow 08/07/2020    CLARITYU Clear 08/07/2020    GLUCOSEU Negative 08/07/2020    BILIRUBINUR Negative 08/07/2020    KETUA Negative 08/07/2020    SPECGRAV 1.020 08/07/2020    BLOODU Negative 08/07/2020    PHUR 6.0 08/07/2020    PROTEINU Negative 08/07/2020 UROBILINOGEN 0.2 08/07/2020    NITRU Negative 08/07/2020    LEUKOCYTESUR Negative 08/07/2020     Urine Micro/Albumin Ratio  No results found for: MALBCR    ROS:  Const: Denies chills, fever, malaise and sweats. Eyes: Denies discharge, pain, redness and visual disturbance. ENMT: Denies earaches, other ear symptoms. Denies nasal or sinus symptoms other than stated  above. Denies mouth and tongue lesions and sore throat. CV: Denies chest discomfort, pain; diaphoresis, dizziness, edema, lightheadedness, orthopnea,  palpitations, syncope and near syncopal episode or any exertional symptoms  Resp: Denies cough, hemoptysis, pleuritic pain, SOB, sputum production and wheezing. GI: Denies abdominal pain, change in bowel habits, hematochezia, melena, nausea and vomiting. : Denies urinary symptoms including dysuria , urgency, frequency or hematuria. Musculo: Denies musculoskeletal symptoms. Skin: Denies bruising and rash. Neuro: Denies headache, numbness, stiff neck, tingling and focal weakness slurred speech or facial  droop  Hema/Lymph: Denies bleeding/bruising tendency and enlarged lymph nodes         Current Outpatient Medications:     metFORMIN (GLUCOPHAGE-XR) 500 MG extended release tablet, Take 1 tablet by mouth daily (with breakfast), Disp: 30 tablet, Rfl: 3    DULoxetine (CYMBALTA) 60 MG extended release capsule, Take 1 capsule by mouth daily, Disp: 30 capsule, Rfl: 12    omeprazole (PRILOSEC) 40 MG delayed release capsule, Take 1 capsule by mouth daily, Disp: 30 capsule, Rfl: 12  Allergies   Allergen Reactions    Prednisone Anxiety       Past Medical History:   Diagnosis Date    Disorder of prostate     Dysthymic disorder     GERD (gastroesophageal reflux disease)     Migraine     Tinea unguium     Vitamin D deficiency      Past Surgical History:   Procedure Laterality Date    CHOLECYSTECTOMY, LAPAROSCOPIC      HERNIA REPAIR       No family history on file.   Social History     Tobacco Use    Smoking status: Never Smoker    Smokeless tobacco: Never Used   Substance Use Topics    Alcohol use: No    Drug use: No     Social History     Social History Narrative    PMH:    Health Maintenance:    Influenza Vaccination - (2017)    Dr Rehana William - nl exam     Medical Problems:    Ulcers - Freddy Zhang. Has had EGD  (3 ulcers, erosion of stomach). repeat . repeat     Last colonoscopy , ,; nl. had 2010, nl. GERD    migraines - follows with Dr Jannet Figueroa; typically around allergy season. started age 25. cervical DDD    overactive bladder - Dr Hector baumann - Dr Suyapa Dawn; surgery    [facility]    Surgical Hx:    Glenny    Hernia repair - Rt ; Dr Praveen Cullen    Vasectomy, T & A    Reviewed, no changes. FH:    Father:    Non Contributory. Mother:    Non Contributory. Brother 1 yr younger chron's    Dad - CHF  71 heavy smoker    Mom - healthy    Reviewed, no changes. SH:    Marital:  - x2, getting . limited caffeine. own [place]    Personal Habits: Cigarette Use: Does Not Smoke - occassional cigar. Alcohol: Drinks Alcoholic    Beverages Rarely. PHYSICAL EXAMINATION:  [ INSTRUCTIONS:  \"[x]\" Indicates a positive item  \"[]\" Indicates a negative item  -- DELETE ALL ITEMS NOT EXAMINED]  Vital Signs: (As obtained by patient/caregiver or practitioner observation)    There were no vitals filed for this visit. Blood pressure-  Heart rate-    Respiratory rate-    Temperature-  Pulse oximetry-     Constitutional: [x] Appears well-developed and well-nourished [x] No apparent distress      [] Abnormal-   Mental status  [x] Alert and awake  [x] Oriented to person/place/time [x]Able to follow commands      Eyes:  EOM    [x]  Normal  [] Abnormal-  Sclera  [x]  Normal  [] Abnormal -         Discharge [x]  None visible  [] Abnormal -    HENT:   [x] Normocephalic, atraumatic.   [] Abnormal   [x] Mouth/Throat: Mucous membranes are moist.     External Ears [x] Normal  [] Abnormal-     Neck: [x] No visualized mass     Pulmonary/Chest: [x] Respiratory effort normal.  [x] No visualized signs of difficulty breathing or respiratory distress        [] Abnormal-      Musculoskeletal:   [x] Normal gait with no signs of ataxia         [x] Normal range of motion of neck        [] Abnormal-       Neurological:        [x] No Facial Asymmetry (Cranial nerve 7 motor function) (limited exam to video visit)          [x] No gaze palsy        [] Abnormal-         Skin:        [x] No significant exanthematous lesions or discoloration noted on facial skin         [] Abnormal-            Psychiatric:       [x] Normal Affect [x] No Hallucinations        [] Abnormal-     Other pertinent observable physical exam findings-     ASSESSMENT/PLAN:   Diagnosis Orders   1. Metabolic syndrome  metFORMIN (GLUCOPHAGE-XR) 500 MG extended release tablet    Comprehensive Metabolic Panel    Hemoglobin A1C   2. Hyperglycemia  metFORMIN (GLUCOPHAGE-XR) 500 MG extended release tablet    Comprehensive Metabolic Panel    Hemoglobin A1C   3. Class 2 obesity due to excess calories without serious comorbidity with body mass index (BMI) of 36.0 to 36.9 in adult     4. Vitamin D deficiency     5. Mixed hyperlipidemia     6. Annual physical exam     7. Prostate disorder     8. Anxiety and depression         Metabolic syndrome  Counseled extensively. Lifestyle modification reviewed. Importance of diet exercise and weight loss reviewed. Micro-macrovascular complications reviewed. He is interested in try metformin with precautions including diarrhea and lactic acidosis. Counseled on recall. Blood work follow-up 3 months sooner as needed. Hyperglycemia  Counseled. See comments under metabolic syndrome.         Gastroesophageal reflux disease without esophagitis  Patient completely asymptomatic, follows with Dr. villa. Taking PPI with risks benefits reviewed including masking underlying etiologies, risk of calcium else works and renal insufficiency, C. diff and otherwise. Had endoscopy around 2018 appropriate timing of dosing of PPI reviewed.      Dysthymia  Stable and asymptomatic on medication. Tolerating well. Risk benefits and ADRs reviewed. Appropriate counseling reviewed. Adamantly denies SI/HI. Appropriate timing of dosing of Cymbalta reviewed.     Disorder of prostate  ASX. Following with Dr. Júnior Michelle,, previous PSA went from 2.1 2.7-he saw urology and had PSA and exam 5/20. .     Health maintenance examination  Health maintenance issues discussed at length 7/20, encouraged yearly physicals     Vitamin D deficiency  Minimally deficient, recommend he start vitamin D3 2000 IUs daily. Mixed hyperlipidemia  Mildly elevated. Risk benefits statin reviewed not interested now, understands importance if diabetic. Risk of hyperlipidemia reviewed. Lifestyle modification reviewed. Counseled extensively and differential diagnoses of above were reviewed, including serious etiologies. Side effects and interactions of medications were reviewed. Plan as above:  Counseled. Add metformin. Blood work follow-up 3-month sooner as needed. As long as symptoms steadily improve/resolve and medical conditions are following the expected course, FU as below, sooner PRN      Return in about 3 months (around 11/17/2020), or if symptoms worsen or fail to improve. Time spent: Greater than Not billed by time      Merle Ramos is a 56 y.o. male being evaluated by a Virtual Visit (video visit) encounter to address concerns as mentioned above. A caregiver was present when appropriate. Due to this being a TeleHealth encounter (During JICKZ-36 public health emergency), evaluation of the following organ systems was limited: Vitals/Constitutional/EENT/Resp/CV/GI//MS/Neuro/Skin/Heme-Lymph-Imm.   Pursuant to the emergency declaration under the [facility] waiver authority and the [facility] Appropriations Act, this Virtual Visit was conducted with patient's (and/or legal guardian's) consent, to reduce the patient's risk of exposure to COVID-19 and provide necessary medical care. The patient (and/or legal guardian) has also been advised to contact this office for worsening conditions or problems, and seek emergency medical treatment and/or call 911 if deemed necessary. Services were provided through a video synchronous discussion virtually to substitute for in-person clinic visit. Patients are advised to check with insurance company to ensure coverage and to fully understand benefits and cost prior to any testing to try to avoid unexpected charges. This note was created with the assistance of voice recognition software. Inadvertent errors may be present. Signs and symptoms to watch for were discussed. Serious signs and symptoms reviewed. ER if any    --Zoila Brambila MD on 8/17/2020 at 4:02 PM    An electronic signature was used to authenticate this note.

## 2020-08-17 NOTE — ASSESSMENT & PLAN NOTE
Counseled extensively. Lifestyle modification reviewed. Importance of diet exercise and weight loss reviewed. Micro-macrovascular complications reviewed. He is interested in try metformin with precautions including diarrhea and lactic acidosis. Counseled on recall. Blood work follow-up 3 months sooner as needed.

## 2020-09-16 PROBLEM — Z00.00 ANNUAL PHYSICAL EXAM: Status: RESOLVED | Noted: 2020-07-01 | Resolved: 2020-09-16

## 2020-12-14 ENCOUNTER — TELEPHONE (OUTPATIENT)
Dept: PRIMARY CARE CLINIC | Age: 60
End: 2020-12-14

## 2020-12-14 NOTE — TELEPHONE ENCOUNTER
There is no diabetes listed in his chart, but my last note states he was started on Metformin the last time. He was supposed to get blood work and follow-up with me in 3 months/November. Therefore needs a follow-up with me, needs blood work first, we can discuss and I can give him appropriate letter at that point. Video visit is appropriate if he prefers.   I will change the date on his blood work from 11/17/2020 to today's date

## 2020-12-14 NOTE — TELEPHONE ENCOUNTER
Patient is looking to change insurance company. He is needing a letter stating he is not a diabetic and you are not giving him diabetic medications including metformin.     Fax [phone]

## 2020-12-15 NOTE — TELEPHONE ENCOUNTER
Patient advised, expressed understanding.  Scheduled for vv and explained that needs labs prior to visit

## 2021-01-11 ENCOUNTER — OFFICE VISIT (OUTPATIENT)
Dept: PODIATRY | Age: 61
End: 2021-01-11
Payer: COMMERCIAL

## 2021-01-11 VITALS
WEIGHT: 229 LBS | TEMPERATURE: 97.8 F | BODY MASS INDEX: 33.82 KG/M2 | DIASTOLIC BLOOD PRESSURE: 78 MMHG | SYSTOLIC BLOOD PRESSURE: 122 MMHG

## 2021-01-11 DIAGNOSIS — M79.675 PAIN IN LEFT TOE(S): ICD-10-CM

## 2021-01-11 DIAGNOSIS — B35.1 TINEA UNGUIUM: ICD-10-CM

## 2021-01-11 DIAGNOSIS — M79.674 PAIN IN TOE OF RIGHT FOOT: ICD-10-CM

## 2021-01-11 DIAGNOSIS — L60.0 INGROWN NAIL: Primary | ICD-10-CM

## 2021-01-11 PROCEDURE — 99212 OFFICE O/P EST SF 10 MIN: CPT | Performed by: PODIATRIST

## 2021-01-11 NOTE — PROGRESS NOTES
21  Ana Verdugo : 1960 Sex: male  Age: 56 y.o. Patient was referred by Carmen Dao MD    Chief Complaint   Patient presents with    Foot Pain     saw pcp Dr. Olivia Barraza on 2020    Toe Pain       SUBJECTIVE patient is seen today for thick pitting mycotic nails ingrown nails. HPI  Review of Systems  Const: Denies constitutional symptoms  Musculo: Denies symptoms other than stated above  Skin: Denies symptoms other than stated above       Current Outpatient Medications:     DULoxetine (CYMBALTA) 60 MG extended release capsule, Take 1 capsule by mouth daily, Disp: 30 capsule, Rfl: 12    omeprazole (PRILOSEC) 40 MG delayed release capsule, Take 1 capsule by mouth daily, Disp: 30 capsule, Rfl: 12    metFORMIN (GLUCOPHAGE-XR) 500 MG extended release tablet, Take 1 tablet by mouth daily (with breakfast) (Patient not taking: Reported on 2021), Disp: 30 tablet, Rfl: 3  Allergies   Allergen Reactions    Prednisone Anxiety       Past Medical History:   Diagnosis Date    Disorder of prostate     Dysthymic disorder     GERD (gastroesophageal reflux disease)     Migraine     Tinea unguium     Vitamin D deficiency      Past Surgical History:   Procedure Laterality Date    CHOLECYSTECTOMY, LAPAROSCOPIC      HERNIA REPAIR       No family history on file.   Social History     Socioeconomic History    Marital status:      Spouse name: Not on file    Number of children: Not on file    Years of education: Not on file    Highest education level: Not on file   Occupational History    Not on file   Social Needs    Financial resource strain: Not on file    Food insecurity     Worry: Not on file     Inability: Not on file    Transportation needs     Medical: Not on file     Non-medical: Not on file   Tobacco Use    Smoking status: Never Smoker    Smokeless tobacco: Never Used   Substance and Sexual Activity    Alcohol use: No    Drug use: No    Sexual activity: Not on file   Lifestyle    Physical activity     Days per week: Not on file     Minutes per session: Not on file    Stress: Not on file   Relationships    Social connections     Talks on phone: Not on file     Gets together: Not on file     Attends Christianity service: Not on file     Active member of club or organization: Not on file     Attends meetings of clubs or organizations: Not on file     Relationship status: Not on file    Intimate partner violence     Fear of current or ex partner: Not on file     Emotionally abused: Not on file     Physically abused: Not on file     Forced sexual activity: Not on file   Other Topics Concern    Not on file   Social History Narrative    PMH:    Health Maintenance:    Influenza Vaccination - (2017)    Dr Kylee Yang - nl exam     Medical Problems:    Ulcers - Milo Jennifer. Has had EGD  (3 ulcers, erosion of stomach). repeat . repeat     Last colonoscopy , ,; nl. had , nl. GERD    migraines - follows with Dr Shabana Hubbard; typically around allergy season. started age 25. cervical DDD    overactive bladder - Dr Puckett [place] - Dr Jessica Elam; surgery    Had measles/chicken pox    [facility] [place]    Surgical Hx:    Glenny    Hernia repair - Rt ; Dr Cait GUARDADO [facility]    Vasectomy, T & A    Reviewed, no changes. FH:    Father:    Non Contributory. Mother:    Non Contributory. Brother 1 yr younger chron's    Dad - CHF  71 heavy smoker    Mom - healthy    Reviewed, no changes. SH:    Marital:  - x2, getting . limited caffeine. own [place]    Personal Habits: Cigarette Use: Does Not Smoke - occassional cigar. Alcohol: Drinks Alcoholic    Beverages Rarely.        Vitals:    21 0726   BP: 122/78   Temp: 97.8 °F (36.6 °C)   TempSrc: Temporal   Weight: 229 lb (103.9 kg)       Focused Lower Extremity Physical Exam:  Vitals:    21 0726   BP: 122/78   Temp: 97.8 °F (36.6 °C)        Foot Exam     Ortho Exam    Vascular: pulses  dp  pt Capillary Refill Time:   Hair growth  Skin:    Edema:    Neurologic:      Musculoskeletal/ Orthopedic examination:    NAIL Toenail Description  Sites of Onychomycosis Involvement (Check affected area)  [x] [x] [x] [x] [x] [x] [x] [x] [x] [x]  5 4 3 2 1 1 2 3 4 5                          Right                                        Left    Thickness  [x] [x] [x] [x] [x] [x] [x] [x] [x] [x]  5 4 3 2 1 1 2 3 4 5                         Right                                        Left    Dystrophic Changes   [x] [x] [x] [x] [x] [x] [x] [x] [x] [x]  5 4 3 2 1 1 2 3 4 5                         Right                                        Left    discolored   [x] [x] [x] [x] [x] [x] [x] [x] [x] [x]  5 4 3 2 1 1 2 3 4 5                          Right                                        Left    Incurvation/Ingrowin   [] [] [] [] [] [] [] [] [] []  5 4 3 2 1 1 2 3 4 5                         Right                                        Left    Inflammation/Pain   [x] [x] [x] [x] [x] [x] [x] [x] [x] [x]  5 4 3 2 1 1 2 3 4 5                         Right                                        Left    Web space  Derm:          Assessment and Plan: Bilateral hallux nails second digit bilaterally old ingrown nail was debrided no dressing was needed patient is to continue monitoring. Kirsten Burdick was seen today for foot pain and toe pain. Diagnoses and all orders for this visit:    Ingrown nail    Tinea unguium    Pain in left toe(s)    Pain in toe of right foot        Return in about 3 months (around 4/11/2021). Seen By:  Roberto Rocha DPM      Document was created using voice recognition software. Note was reviewed, however may contain grammatical errors.

## 2021-04-12 ENCOUNTER — PROCEDURE VISIT (OUTPATIENT)
Dept: PODIATRY | Age: 61
End: 2021-04-12
Payer: COMMERCIAL

## 2021-04-12 VITALS — TEMPERATURE: 98.6 F | SYSTOLIC BLOOD PRESSURE: 112 MMHG | DIASTOLIC BLOOD PRESSURE: 68 MMHG

## 2021-04-12 DIAGNOSIS — M79.674 PAIN IN TOE OF RIGHT FOOT: ICD-10-CM

## 2021-04-12 DIAGNOSIS — M79.675 PAIN IN LEFT TOE(S): ICD-10-CM

## 2021-04-12 DIAGNOSIS — B35.1 TINEA UNGUIUM: Primary | ICD-10-CM

## 2021-04-12 PROCEDURE — 99213 OFFICE O/P EST LOW 20 MIN: CPT | Performed by: PODIATRIST

## 2021-04-12 RX ORDER — TERBINAFINE HYDROCHLORIDE 250 MG/1
250 TABLET ORAL DAILY
Qty: 90 TABLET | Refills: 0 | Status: SHIPPED | OUTPATIENT
Start: 2021-04-12 | End: 2021-07-11

## 2021-04-12 NOTE — PROGRESS NOTES
21  Drew Manzo : 1960 Sex: male  Age: 56 y.o. Patient was referred by Deidre Diaz MD    Chief Complaint   Patient presents with    Toe Pain     saw pcp Dr. Ying Durham 2020    Ingrown Toenail    Nail Problem       SUBJECTIVE patient is seen today for thick pitting mycotic nails ingrown nails. HPI  Review of Systems  Const: Denies constitutional symptoms  Musculo: Denies symptoms other than stated above  Skin: Denies symptoms other than stated above       Current Outpatient Medications:     terbinafine (LAMISIL) 250 MG tablet, Take 1 tablet by mouth daily, Disp: 90 tablet, Rfl: 0    ciclopirox (PENLAC) 8 % solution, Apply topically nightly., Disp: 6 mL, Rfl: 2    DULoxetine (CYMBALTA) 60 MG extended release capsule, Take 1 capsule by mouth daily, Disp: 30 capsule, Rfl: 12    omeprazole (PRILOSEC) 40 MG delayed release capsule, Take 1 capsule by mouth daily, Disp: 30 capsule, Rfl: 12    metFORMIN (GLUCOPHAGE-XR) 500 MG extended release tablet, Take 1 tablet by mouth daily (with breakfast) (Patient not taking: Reported on 2021), Disp: 30 tablet, Rfl: 3  Allergies   Allergen Reactions    Prednisone Anxiety       Past Medical History:   Diagnosis Date    Disorder of prostate     Dysthymic disorder     GERD (gastroesophageal reflux disease)     Migraine     Tinea unguium     Vitamin D deficiency      Past Surgical History:   Procedure Laterality Date    CHOLECYSTECTOMY, LAPAROSCOPIC      HERNIA REPAIR       No family history on file.   Social History     Socioeconomic History    Marital status:      Spouse name: Not on file    Number of children: Not on file    Years of education: Not on file    Highest education level: Not on file   Occupational History    Not on file   Social Needs    Financial resource strain: Not on file    Food insecurity     Worry: Not on file     Inability: Not on file    Transportation needs     Medical: Not on file Non-medical: Not on file   Tobacco Use    Smoking status: Never Smoker    Smokeless tobacco: Never Used   Substance and Sexual Activity    Alcohol use: No    Drug use: No    Sexual activity: Not on file   Lifestyle    Physical activity     Days per week: Not on file     Minutes per session: Not on file    Stress: Not on file   Relationships    Social connections     Talks on phone: Not on file     Gets together: Not on file     Attends Cheondoism service: Not on file     Active member of club or organization: Not on file     Attends meetings of clubs or organizations: Not on file     Relationship status: Not on file    Intimate partner violence     Fear of current or ex partner: Not on file     Emotionally abused: Not on file     Physically abused: Not on file     Forced sexual activity: Not on file   Other Topics Concern    Not on file   Social History Narrative    PMH:    Health Maintenance:    Influenza Vaccination - (2017)    Dr Jarred Sauceda - nl exam     Medical Problems:    Ulcers - Lisamilly Haynes. Has had EGD  (3 ulcers, erosion of stomach). repeat . repeat     Last colonoscopy , ,; nl. had , nl. GERD    migraines - follows with Dr Johana Dubois; typically around allergy season. started age 25. cervical DDD    overactive bladder - Dr [place] - Dr Jillian Reis; surgery    Had measles/chicken pox    [facility]    Surgical Hx:    Glenny    Hernia repair - Rt ; Dr Watkins Organ    Vasectomy, T & A    Reviewed, no changes. FH:    Father:    Non Contributory. Mother:    Non Contributory. Brother 1 yr younger chron's    Dad - CHF  71 heavy smoker    Mom - healthy    Reviewed, no changes. SH:    Marital:  - x2, getting . limited caffeine. own [place]    Personal Habits: Cigarette Use: Does Not Smoke - occassional cigar. Alcohol: Drinks Alcoholic    Beverages Rarely.        Vitals:    21 0659   BP: 112/68   Temp: 98.6 °F (37 °C)   TempSrc: Temporal software. Note was reviewed, however may contain grammatical errors.

## 2021-07-08 DIAGNOSIS — R73.9 HYPERGLYCEMIA: ICD-10-CM

## 2021-07-08 DIAGNOSIS — B35.1 TINEA UNGUIUM: ICD-10-CM

## 2021-07-08 DIAGNOSIS — E88.810 METABOLIC SYNDROME: ICD-10-CM

## 2021-07-08 LAB
ALBUMIN SERPL-MCNC: 4.2 G/DL (ref 3.5–5.2)
ALP BLD-CCNC: 63 U/L (ref 40–129)
ALT SERPL-CCNC: 18 U/L (ref 0–40)
ANION GAP SERPL CALCULATED.3IONS-SCNC: 11 MMOL/L (ref 7–16)
AST SERPL-CCNC: 20 U/L (ref 0–39)
BILIRUB SERPL-MCNC: 0.5 MG/DL (ref 0–1.2)
BILIRUBIN DIRECT: <0.2 MG/DL (ref 0–0.3)
BILIRUBIN, INDIRECT: NORMAL MG/DL (ref 0–1)
BUN BLDV-MCNC: 22 MG/DL (ref 6–23)
CALCIUM SERPL-MCNC: 9.4 MG/DL (ref 8.6–10.2)
CHLORIDE BLD-SCNC: 103 MMOL/L (ref 98–107)
CO2: 25 MMOL/L (ref 22–29)
CREAT SERPL-MCNC: 1 MG/DL (ref 0.7–1.2)
GFR AFRICAN AMERICAN: >60
GFR NON-AFRICAN AMERICAN: >60 ML/MIN/1.73
GLUCOSE BLD-MCNC: 117 MG/DL (ref 74–99)
HBA1C MFR BLD: 5.7 % (ref 4–5.6)
POTASSIUM SERPL-SCNC: 4.2 MMOL/L (ref 3.5–5)
SODIUM BLD-SCNC: 139 MMOL/L (ref 132–146)
TOTAL PROTEIN: 7 G/DL (ref 6.4–8.3)

## 2021-07-12 ENCOUNTER — TELEPHONE (OUTPATIENT)
Dept: PRIMARY CARE CLINIC | Age: 61
End: 2021-07-12

## 2021-07-12 ENCOUNTER — PROCEDURE VISIT (OUTPATIENT)
Dept: PODIATRY | Age: 61
End: 2021-07-12
Payer: COMMERCIAL

## 2021-07-12 VITALS — DIASTOLIC BLOOD PRESSURE: 70 MMHG | TEMPERATURE: 97.1 F | SYSTOLIC BLOOD PRESSURE: 112 MMHG

## 2021-07-12 DIAGNOSIS — L60.0 INGROWN NAIL: ICD-10-CM

## 2021-07-12 DIAGNOSIS — B35.1 TINEA UNGUIUM: Primary | ICD-10-CM

## 2021-07-12 DIAGNOSIS — E55.9 VITAMIN D DEFICIENCY: ICD-10-CM

## 2021-07-12 DIAGNOSIS — R73.9 HYPERGLYCEMIA: ICD-10-CM

## 2021-07-12 DIAGNOSIS — M79.675 PAIN IN LEFT TOE(S): ICD-10-CM

## 2021-07-12 DIAGNOSIS — N42.9 PROSTATE DISORDER: ICD-10-CM

## 2021-07-12 DIAGNOSIS — Z00.00 HEALTH MAINTENANCE EXAMINATION: Primary | ICD-10-CM

## 2021-07-12 DIAGNOSIS — M79.674 PAIN IN TOE OF RIGHT FOOT: ICD-10-CM

## 2021-07-12 DIAGNOSIS — E78.2 MIXED HYPERLIPIDEMIA: ICD-10-CM

## 2021-07-12 PROCEDURE — 99212 OFFICE O/P EST SF 10 MIN: CPT | Performed by: PODIATRIST

## 2021-07-12 RX ORDER — TERBINAFINE HYDROCHLORIDE 250 MG/1
250 TABLET ORAL DAILY
COMMUNITY
End: 2021-08-23 | Stop reason: SDUPTHER

## 2021-07-12 NOTE — TELEPHONE ENCOUNTER
Spoke with patient. He would like to have psa drawn since it has been over 1 year. Can you please add lab.  He made appt for 8-16

## 2021-07-12 NOTE — TELEPHONE ENCOUNTER
Please schedule a follow-up with me as I do not see one scheduled. Then send back to me and I will call him at the end of the day after seeing patients. If you are able to elicit his question I can try to answer through you as well.

## 2021-07-12 NOTE — PROGRESS NOTES
21  Anita Latin : 1960 Sex: male  Age: 56 y.o. Patient was referred by Tony Fountain MD    Chief Complaint   Patient presents with    Toe Pain     saw pcp Dr. Andrey Beth 2021    Nail Problem     lamisil ck got labs done wnl       SUBJECTIVE patient is seen today for thick pitting mycotic nails ingrown nails. Toe Pain       Review of Systems  Const: Denies constitutional symptoms  Musculo: Denies symptoms other than stated above  Skin: Denies symptoms other than stated above       Current Outpatient Medications:     terbinafine (LAMISIL) 250 MG tablet, Take 250 mg by mouth daily, Disp: , Rfl:     ciclopirox (PENLAC) 8 % solution, Apply topically nightly., Disp: 6 mL, Rfl: 2    DULoxetine (CYMBALTA) 60 MG extended release capsule, Take 1 capsule by mouth daily, Disp: 30 capsule, Rfl: 12    omeprazole (PRILOSEC) 40 MG delayed release capsule, Take 1 capsule by mouth daily, Disp: 30 capsule, Rfl: 12    metFORMIN (GLUCOPHAGE-XR) 500 MG extended release tablet, Take 1 tablet by mouth daily (with breakfast) (Patient not taking: Reported on 2021), Disp: 30 tablet, Rfl: 3  Allergies   Allergen Reactions    Prednisone Anxiety       Past Medical History:   Diagnosis Date    Disorder of prostate     Dysthymic disorder     GERD (gastroesophageal reflux disease)     Migraine     Tinea unguium     Vitamin D deficiency      Past Surgical History:   Procedure Laterality Date    CHOLECYSTECTOMY, LAPAROSCOPIC      HERNIA REPAIR       No family history on file.   Social History     Socioeconomic History    Marital status:      Spouse name: Not on file    Number of children: Not on file    Years of education: Not on file    Highest education level: Not on file   Occupational History    Not on file   Tobacco Use    Smoking status: Never Smoker    Smokeless tobacco: Never Used   Vaping Use    Vaping Use: Never used   Substance and Sexual Activity    Alcohol use: No    Drug use: No    Sexual activity: Not on file   Other Topics Concern    Not on file   Social History Narrative    PMH:    Health Maintenance:    Influenza Vaccination - (2017)    Dr Sheeba Melgar - nl exam     Medical Problems:    Ulcers - Yosseff. Has had EGD  (3 ulcers, erosion of stomach). repeat . repeat     Last colonoscopy , ,; nl. had 2010, nl. GERD    migraines - follows with Dr Rosario Oliver; typically around allergy season. started age 25. cervical DDD    overactive bladder - Dr Davina [place] - Dr Sydney Jason; surgery    Had measles/chicken pox    [facility] [place]    Surgical Hx:    Glenny    Hernia repair - Rt ; Dr Tip Blanca    Vasectomy, T & A    Reviewed, no changes. FH:    Father:    Non Contributory. Mother:    Non Contributory. Brother 1 yr younger chron's    Dad - CHF  71 heavy smoker    Mom - healthy    Reviewed, no changes. SH:    Marital:  - x2, getting . limited caffeine. own [place]    Personal Habits: Cigarette Use: Does Not Smoke - occassional cigar. Alcohol: Drinks Alcoholic    Beverages Rarely. Social Determinants of Health     Financial Resource Strain:     Difficulty of Paying Living Expenses:    Food Insecurity:     Worried About Running Out of Food in the Last Year:     [address] in the Last Year:    Transportation Needs:     Lack of Transportation (Medical):      Lack of Transportation (Non-Medical):    Physical Activity:     Days of Exercise per Week:     Minutes of Exercise per Session:    Stress:     Feeling of Stress :    Social Connections:     Frequency of Communication with Friends and Family:     Frequency of Social Gatherings with Friends and Family:     Attends Buddhist Services:     Active Member of Clubs or Organizations:     Attends Club or Organization Meetings:     Marital Status:    Intimate Partner Violence:     Fear of Current or Ex-Partner:     Emotionally Abused:     Physically Abused:  Sexually Abused:        Vitals:    07/12/21 0700   BP: 112/70   Temp: 97.1 °F (36.2 °C)   TempSrc: Tympanic       Focused Lower Extremity Physical Exam:  Vitals:    07/12/21 0700   BP: 112/70   Temp: 97.1 °F (36.2 °C)        Foot Exam    General  General Appearance: appears stated age and healthy   Orientation: alert and oriented to person, place, and time       Right Foot/Ankle     Inspection and Palpation  Skin Exam: skin intact; Neurovascular  Dorsalis pedis: 3+  Posterior tibial: 3+  Saphenous nerve sensation: normal  Tibial nerve sensation: normal  Superficial peroneal nerve sensation: normal  Deep peroneal nerve sensation: normal  Sural nerve sensation: normal      Left Foot/Ankle      Inspection and Palpation  Skin Exam: skin intact;      Neurovascular  Dorsalis pedis: 3+  Posterior tibial: 3+  Saphenous nerve sensation: normal  Tibial nerve sensation: normal  Superficial peroneal nerve sensation: normal  Deep peroneal nerve sensation: normal  Sural nerve sensation: normal             Ortho Exam    Vascular: pulses  dp  pt   Capillary Refill Time:   Hair growth  Skin:    Edema:    Neurologic:      Musculoskeletal/ Orthopedic examination:    NAIL Toenail Description  Sites of Onychomycosis Involvement (Check affected area)  [x] [x] [x] [x] [x] [x] [x] [x] [x] [x]  5 4 3 2 1 1 2 3 4 5                          Right                                        Left    Thickness  [x] [x] [x] [x] [x] [x] [x] [x] [x] [x]  5 4 3 2 1 1 2 3 4 5                         Right                                        Left    Dystrophic Changes   [x] [x] [x] [x] [x] [x] [x] [x] [x] [x]  5 4 3 2 1 1 2 3 4 5                         Right                                        Left    discolored   [x] [x] [x] [x] [x] [x] [x] [x] [x] [x]  5 4 3 2 1 1 2 3 4 5                          Right                                        Left    Incurvation/Ingrowin   [] [] [] [] [] [] [] [] [] []  5 4 3 2 1 1 2 3 4 5 Right                                        Left    Inflammation/Pain   [x] [x] [x] [x] [x] [x] [x] [x] [x] [x]  5 4 3 2 1 1 2 3 4 5                         Right                                        Left    Web space  Derm:          Assessment and Plan: We can start back with the Lamisil 1 pill a day for 90 days as well as Penlac. Liver enzymes were ordered. Lis Lemus was seen today for toe pain and nail problem. Diagnoses and all orders for this visit:    Tinea unguium    Pain in left toe(s)    Pain in toe of right foot    Ingrown nail        No follow-ups on file. Seen By:  Norma Ackerman DPM      Document was created using voice recognition software. Note was reviewed, however may contain grammatical errors.

## 2021-07-12 NOTE — TELEPHONE ENCOUNTER
The pt had his labs drawn 7/8, he is calling asking if you would be able to give him a call at [phone] because he would like to discuss something about them with you

## 2021-08-16 ENCOUNTER — OFFICE VISIT (OUTPATIENT)
Dept: PRIMARY CARE CLINIC | Age: 61
End: 2021-08-16
Payer: COMMERCIAL

## 2021-08-16 VITALS
OXYGEN SATURATION: 95 % | SYSTOLIC BLOOD PRESSURE: 136 MMHG | HEART RATE: 86 BPM | WEIGHT: 242 LBS | DIASTOLIC BLOOD PRESSURE: 70 MMHG | TEMPERATURE: 97.6 F | HEIGHT: 69 IN | BODY MASS INDEX: 35.84 KG/M2

## 2021-08-16 DIAGNOSIS — F32.A ANXIETY AND DEPRESSION: ICD-10-CM

## 2021-08-16 DIAGNOSIS — K21.9 GASTROESOPHAGEAL REFLUX DISEASE, UNSPECIFIED WHETHER ESOPHAGITIS PRESENT: ICD-10-CM

## 2021-08-16 DIAGNOSIS — Z00.00 HEALTH MAINTENANCE EXAMINATION: Primary | ICD-10-CM

## 2021-08-16 DIAGNOSIS — R73.9 HYPERGLYCEMIA: ICD-10-CM

## 2021-08-16 DIAGNOSIS — Z12.11 COLON CANCER SCREENING: ICD-10-CM

## 2021-08-16 DIAGNOSIS — F41.9 ANXIETY AND DEPRESSION: ICD-10-CM

## 2021-08-16 DIAGNOSIS — E78.2 MIXED HYPERLIPIDEMIA: ICD-10-CM

## 2021-08-16 DIAGNOSIS — E55.9 VITAMIN D DEFICIENCY: ICD-10-CM

## 2021-08-16 DIAGNOSIS — N42.9 PROSTATE DISORDER: ICD-10-CM

## 2021-08-16 DIAGNOSIS — E66.09 CLASS 2 OBESITY DUE TO EXCESS CALORIES WITHOUT SERIOUS COMORBIDITY WITH BODY MASS INDEX (BMI) OF 36.0 TO 36.9 IN ADULT: ICD-10-CM

## 2021-08-16 PROCEDURE — 99396 PREV VISIT EST AGE 40-64: CPT | Performed by: FAMILY MEDICINE

## 2021-08-16 RX ORDER — TADALAFIL 5 MG/1
5 TABLET ORAL DAILY
COMMUNITY

## 2021-08-16 RX ORDER — OMEPRAZOLE 20 MG/1
20 CAPSULE, DELAYED RELEASE ORAL DAILY
Qty: 30 CAPSULE | Refills: 12 | Status: SHIPPED
Start: 2021-08-16 | End: 2022-02-14 | Stop reason: SDUPTHER

## 2021-08-16 RX ORDER — DULOXETIN HYDROCHLORIDE 60 MG/1
60 CAPSULE, DELAYED RELEASE ORAL DAILY
Qty: 30 CAPSULE | Refills: 12 | Status: SHIPPED
Start: 2021-08-16 | End: 2022-02-14 | Stop reason: SDUPTHER

## 2021-08-16 SDOH — ECONOMIC STABILITY: FOOD INSECURITY: WITHIN THE PAST 12 MONTHS, THE FOOD YOU BOUGHT JUST DIDN'T LAST AND YOU DIDN'T HAVE MONEY TO GET MORE.: PATIENT DECLINED

## 2021-08-16 SDOH — ECONOMIC STABILITY: FOOD INSECURITY: WITHIN THE PAST 12 MONTHS, YOU WORRIED THAT YOUR FOOD WOULD RUN OUT BEFORE YOU GOT MONEY TO BUY MORE.: PATIENT DECLINED

## 2021-08-16 ASSESSMENT — SOCIAL DETERMINANTS OF HEALTH (SDOH): HOW HARD IS IT FOR YOU TO PAY FOR THE VERY BASICS LIKE FOOD, HOUSING, MEDICAL CARE, AND HEATING?: PATIENT DECLINED

## 2021-08-16 NOTE — PROGRESS NOTES
20  Marylen Braun : 1960 Sex: male  Age: 59 y.o. Chief Complaint   Patient presents with    Discuss Labs       HPI:   He is here for follow-up/physical.  Overall he feels well. Just saw Dr. Janice Diaz who ordered what sounds like an MRI and will follow up. Otherwise feeling well. No GI symptoms, taking omeprazole. Found lactose to be a bigger issue. We discussed weaning and trying to stop the omeprazole    July CMP showed glucose 117 with a hemoglobin A1c of 5.7. He was unable to tolerate Metformin with GI complaints.   In August had a PSA up to 4.62 urinalysis negative CBC with differential normal vitamin D 29 TSH 1.3 HDL 45  triglyceride 68      Most Recent Labs  CBC  Lab Results   Component Value Date    WBC 4.9 08/10/2021    WBC 5.4 2020    RBC 4.58 08/10/2021    RBC 4.79 2020    HGB 13.6 08/10/2021    HGB 14.0 2020    HCT 42.4 08/10/2021    HCT 43.7 2020    MCV 92.6 08/10/2021    MCV 91.2 2020     08/10/2021     2020      CMP  Lab Results   Component Value Date     2021     2020    K 4.2 2021    K 4.1 2020    K 4.1 2019     2021     2020    CO2 25 2021    CO2 23 2020    ANIONGAP 11 2021    ANIONGAP 15 2020    GLUCOSE 117 2021    GLUCOSE 115 2020    BUN 22 2021    BUN 22 2020    CREATININE 1.0 2021    CREATININE 1.0 2020    CREATININE 1.0 2019    LABGLOM >60 2021    LABGLOM >60 2020    GFRAA >60 2021    GFRAA >60 2020    CALCIUM 9.4 2021    CALCIUM 9.3 2020    PROT 7.0 2021    PROT 6.7 2020    LABALBU 4.2 2021    LABALBU 4.1 2020    BILITOT 0.5 2021    BILITOT 0.5 2020    ALKPHOS 63 2021    ALKPHOS 58 2020    AST 20 2021    AST 23 2020    ALT 18 2021    ALT 31 2020     A1C  Lab Results   Component Value Date    LABA1C 5.7 07/08/2021    LABA1C 6.1 08/07/2020    LABA1C 5.4 05/10/2019     TSH  Lab Results   Component Value Date    TSH 1.280 08/10/2021    TSH 1.660 08/07/2020     FREET4  No results found for: I1NENRP  LIPID  Lab Results   Component Value Date    CHOL 169 08/10/2021    CHOL 185 08/07/2020    CHOL 153 05/10/2019    HDL 45 08/10/2021    HDL 42 08/07/2020    HDL 45 05/10/2019    LDLCALC 110 08/10/2021    LDLCALC 126 08/07/2020    LDLCALC 96 05/10/2019    TRIG 68 08/10/2021    TRIG 87 08/07/2020    TRIG 59 05/10/2019    CHOLHDLRATIO 4.4 01/17/2019     VITAMIN D  Lab Results   Component Value Date    VITD25 29 08/10/2021    VITD25 27 08/07/2020     MAGNESIUM  No results found for: MG   PHOS  No results found for: PHOS   ASHU   No results found for: ASHU  RHEUMATOID FACTOR  No results found for: RF  PSA  Lab Results   Component Value Date    PSA 4.62 08/10/2021    PSA 3.10 05/15/2020    PSA 3.32 02/01/2019      HEPATITIS C  No results found for: HCVABI  HIV  No results found for: EBF4JEV, HIV1QT  UA  Lab Results   Component Value Date    COLORU Yellow 08/10/2021    COLORU Yellow 08/07/2020    CLARITYU Clear 08/10/2021    CLARITYU Clear 08/07/2020    GLUCOSEU Negative 08/10/2021    GLUCOSEU Negative 08/07/2020    BILIRUBINUR Negative 08/10/2021    BILIRUBINUR Negative 08/07/2020    KETUA Negative 08/10/2021    KETUA Negative 08/07/2020    SPECGRAV 1.020 08/10/2021    SPECGRAV 1.020 08/07/2020    BLOODU Negative 08/10/2021    BLOODU Negative 08/07/2020    PHUR 6.5 08/10/2021    PHUR 6.0 08/07/2020    PROTEINU Negative 08/10/2021    PROTEINU Negative 08/07/2020    UROBILINOGEN 1.0 08/10/2021    UROBILINOGEN 0.2 08/07/2020    NITRU Negative 08/10/2021    NITRU Negative 08/07/2020    LEUKOCYTESUR Negative 08/10/2021    LEUKOCYTESUR Negative 08/07/2020     Urine Micro/Albumin Ratio  No results found for: MALBCR      Review of Systems  ROS:  Const: Denies chills, fever, malaise and sweats.   Eyes: Denies discharge, pain, redness and visual disturbance. ENMT: Denies earaches, other ear symptoms. Denies nasal or sinus symptoms other than stated  above. Denies mouth and tongue lesions and sore throat. CV: Denies chest discomfort, pain; diaphoresis, dizziness, edema, lightheadedness, orthopnea,  palpitations, syncope and near syncopal episode or any exertional symptoms  Resp: Denies cough, hemoptysis, pleuritic pain, SOB, sputum production and wheezing. GI: Denies abdominal pain, change in bowel habits, hematochezia, melena, nausea and vomiting. : Denies urinary symptoms including dysuria , urgency, frequency or hematuria. Musculo: Denies musculoskeletal symptoms. Skin: Denies bruising and rash. Neuro: Denies headache, numbness, stiff neck, tingling and focal weakness slurred speech or facial  droop  Hema/Lymph: Denies bleeding/bruising tendency and enlarged lymph nodes      Health Maintenance: Well balanced/proper diet reviewed. Counseled on vitamins/supplents. Exercise recommendations reviewed. Reviewed recommendations regarding, covid j&j,  Td (Tdap) (9/13), pneumovax/prevnar 13  , flu  shot (seasonal), Hepatitis vaccines and shingles vaccine (shingrix reviewed). Importance of Regular Eye  and Dental exams reviewed. Risks/Benefits of ASA reviewed and discussed current  recommendations. Caffeine risks/limits and Sun protection reviewed. Notify if any new or changing  moles/skin lesions etc. Dexa Scan indications/ risk factors for osteoportic fractures and prevention  reviewed. Counseled on testicular exams, prostate exams. Colonoscopy recommendations  reviewed. 4/13 , 1/17 Yosseff. Pt denies change in bowel habbits or [address] of colon polyps/CA. Heme Cards provided. Past ekg reviewed with pt. Indications for repeat and other  cardiac testing including referral, Stress test, 2D  echo, etc. dasx Reviewed indications for PFT's. Also counseled on indications for imaging.  Pt declines  further imaging including Brain, carotid, chest, Abdominal, Aortic or otherwise      Current Outpatient Medications:     DULoxetine (CYMBALTA) 60 MG extended release capsule, Take 1 capsule by mouth daily, Disp: 30 capsule, Rfl: 12    omeprazole (PRILOSEC) 20 MG delayed release capsule, Take 1 capsule by mouth daily, Disp: 30 capsule, Rfl: 12    tadalafil (CIALIS) 5 MG tablet, Take 5 mg by mouth daily, Disp: , Rfl:     terbinafine (LAMISIL) 250 MG tablet, Take 250 mg by mouth daily, Disp: , Rfl:      Allergies   Allergen Reactions    Prednisone Anxiety       Past Medical History:   Diagnosis Date    Disorder of prostate     Dysthymic disorder     GERD (gastroesophageal reflux disease)     Migraine     Tinea unguium     Vitamin D deficiency      Past Surgical History:   Procedure Laterality Date    CHOLECYSTECTOMY, LAPAROSCOPIC      HERNIA REPAIR       No family history on file. Social History     Tobacco Use    Smoking status: Never Smoker    Smokeless tobacco: Never Used   Vaping Use    Vaping Use: Never used   Substance Use Topics    Alcohol use: No    Drug use: No      Social History     Social History Narrative    PMH:    Health Maintenance:    Influenza Vaccination - (2017)    Dr Caden Adams - nl exam     Medical Problems:    Ulcers - Domenic Haste. Has had EGD  (3 ulcers, erosion of stomach). repeat . repeat     Last colonoscopy , ,; nl. had , nl. GERD    migraines - follows with Dr Tiffani Rivera; typically around allergy season. started age 25. cervical DDD    overactive bladder - Dr Barb baumann - Dr Todd Roa; surgery    Had measles/chicken pox    [facility]    Surgical Hx:    Glenny    Hernia repair - Rt ; Dr Roberto Wesley    Vasectomy, T & A    Reviewed, no changes. FH:    Father:    Non Contributory. Mother:    Non Contributory. Brother 1 yr younger chron's    Dad - CHF  71 heavy smoker    Mom - healthy    Reviewed, no changes.     SH:    Marital:  - x2, getting . limited caffeine. own [place]    Personal Habits: Cigarette Use: Does Not Smoke - occassional cigar. Alcohol: Drinks Alcoholic    Beverages Rarely. Vitals:    08/16/21 0933   BP: 136/70   Pulse: 86   Temp: 97.6 °F (36.4 °C)   SpO2: 95%   Weight: 242 lb (109.8 kg)   Height: 5' 9\" (1.753 m)       Physical Exam  Exam:  Const: Appears comfortable. No signs of acute distress present. Head/Face: Atraumatic on inspection. Eyes: EOMI in both eyes. No discharge from the eyes. PERRL. Sclerae clear. ENMT: Auditory canals normal. Tympanic membranes: intact and translucent. External nose WNL. Nasal mucosa is clear. Oropharynx: No erythema or exudate. Posterior pharynx is normal.  Neck: Supple. Palpation reveals no adenopathy. No masses appreciated. No JVD. Carotids: no  bruits. Resp: Respirations are unlabored. Clear to auscultation. No rales, rhonchi or wheezes appreciated  over the lungs bilaterally. CV: Rate is regular. Rhythm is regular. No gallop or rubs. No heart murmur appreciated. Extremities: No clubbing, cyanosis, or edema. No calf inflammation or tenderness. Abdomen: Bowel sounds are normoactive. Abdomen is soft, nontender, and nondistended. No  abdominal masses. No palpable hepatosplenomegaly. Genitalia: defers to urology  Rectal: defers urology  Lymph: No palpable or visible regional lymphadenopathy. Musculoskeletal: no acute joint inflammation. Skin: Dry and warm with no rash. Skin normal to inspection and palpation overall. Neuro: Alert and oriented. Affect: appropriate. Upper Extremities: 5/5 bilaterally. Lower Extremities:  5/5 bilaterally. Sensation intact to light touch. Reflexes: DTR's are symmetric and 2+ bilaterally. .  Cranial Nerves: Cranial nerves grossly intact. Genitalia/rectal - defers to Dr Taniya Enciso (Saw 5/20 and had PSA)    Assessment and Plan:   Diagnosis Orders   1. Health maintenance examination     2. Vitamin D deficiency  Vitamin D 25 Hydroxy   3.  Hyperglycemia of prostate  ASX. Following with Dr. Eleazar Hennessy,, recently saw, has MRI pending and he will follow up with them.     Vitamin D deficiency  Minimally deficient, recommend he start vitamin D3 2000 IUs daily. Mixed hyperlipidemia  Mildly elevated. Risk benefits statin reviewed not interested now, understands importance if diabetic. Risk of hyperlipidemia reviewed. Lifestyle modification reviewed. Plan as above. Counseled extensively and differential diagnoses relevant to above were reviewed, including serious etiologies. Side effects and interactions of medications were reviewed. Counseled. Cont per multiple specialists incl urology, recently saw. Started cialis thru them. Pros/cons reviewed. Mri pending. Fu pending. Fu allergist with shots pending. No gi sxs. Otherwise plan blood work and follow-up 6 months sooner as needed    As long as symptoms steadily improve/resolve, and medical conditions follow the expected course, FU as below, sooner PRN. Return in about 6 months (around 2/16/2022), or if symptoms worsen or fail to improve. Signs and symptoms to watch for discussed, serious signs and symptoms reviewed. ER if any. Radha Beasley MD    Patients are advised to check with insurance company to ensure coverage and to fully understand benefits and cost prior to any testing. This note was created with the assistance of voice recognition software. Document was reviewed however may contain grammatical errors.

## 2021-08-23 ENCOUNTER — TELEPHONE (OUTPATIENT)
Dept: PODIATRY | Age: 61
End: 2021-08-23

## 2021-08-23 RX ORDER — TERBINAFINE HYDROCHLORIDE 250 MG/1
250 TABLET ORAL DAILY
Qty: 30 TABLET | Refills: 0 | Status: SHIPPED | OUTPATIENT
Start: 2021-08-23 | End: 2021-09-22

## 2021-10-11 ENCOUNTER — PROCEDURE VISIT (OUTPATIENT)
Dept: PODIATRY | Age: 61
End: 2021-10-11
Payer: COMMERCIAL

## 2021-10-11 VITALS
TEMPERATURE: 97.2 F | BODY MASS INDEX: 35.74 KG/M2 | SYSTOLIC BLOOD PRESSURE: 118 MMHG | WEIGHT: 242 LBS | DIASTOLIC BLOOD PRESSURE: 72 MMHG

## 2021-10-11 DIAGNOSIS — M79.675 PAIN IN LEFT TOE(S): ICD-10-CM

## 2021-10-11 DIAGNOSIS — M79.674 PAIN IN TOE OF RIGHT FOOT: ICD-10-CM

## 2021-10-11 DIAGNOSIS — N42.9 PROSTATE DISORDER: Primary | ICD-10-CM

## 2021-10-11 DIAGNOSIS — B35.1 TINEA UNGUIUM: Primary | ICD-10-CM

## 2021-10-11 PROCEDURE — 99212 OFFICE O/P EST SF 10 MIN: CPT | Performed by: PODIATRIST

## 2021-10-11 NOTE — PROGRESS NOTES
21  Peggy Smith : 1960 Sex: male  Age: 59 y.o. Patient was referred by Marie Gallagher MD    Chief Complaint   Patient presents with    Toe Pain     saw pcp Dr. Anthony Patel 21    Nail Problem       SUBJECTIVE patient is seen today for thick pitting mycotic nails ingrown nails. Toe Pain       Review of Systems  Const: Denies constitutional symptoms  Musculo: Denies symptoms other than stated above  Skin: Denies symptoms other than stated above       Current Outpatient Medications:     DULoxetine (CYMBALTA) 60 MG extended release capsule, Take 1 capsule by mouth daily, Disp: 30 capsule, Rfl: 12    omeprazole (PRILOSEC) 20 MG delayed release capsule, Take 1 capsule by mouth daily, Disp: 30 capsule, Rfl: 12    tadalafil (CIALIS) 5 MG tablet, Take 5 mg by mouth daily, Disp: , Rfl:   Allergies   Allergen Reactions    Prednisone Anxiety       Past Medical History:   Diagnosis Date    Disorder of prostate     Dysthymic disorder     GERD (gastroesophageal reflux disease)     Migraine     Tinea unguium     Vitamin D deficiency      Past Surgical History:   Procedure Laterality Date    CHOLECYSTECTOMY, LAPAROSCOPIC      HERNIA REPAIR       No family history on file. Social History     Socioeconomic History    Marital status:      Spouse name: Not on file    Number of children: Not on file    Years of education: Not on file    Highest education level: Not on file   Occupational History    Not on file   Tobacco Use    Smoking status: Never Smoker    Smokeless tobacco: Never Used   Vaping Use    Vaping Use: Never used   Substance and Sexual Activity    Alcohol use: No    Drug use: No    Sexual activity: Not on file   Other Topics Concern    Not on file   Social History Narrative    PMH:    Health Maintenance:    Influenza Vaccination - (2017)    Dr Henry Ge - nl exam     Medical Problems:    Ulcers - Yosseff. Has had EGD  (3 ulcers, erosion of stomach). repeat . repeat     Last colonoscopy , ,; nl. had 2010, nl. GERD    migraines - follows with Dr Ena Angulo; typically around allergy season. started age 25. cervical DDD    overactive bladder - Dr Jael Ford spur - Dr Erika Payan; surgery    Had measles/chicken pox    [facility]    Surgical Hx:    Glenny    Hernia repair - Rt ; Dr Madilyn Brittle    Vasectomy, T & A    Reviewed, no changes. FH:    Father:    Non Contributory. Mother:    Non Contributory. Brother 1 yr younger chron's    Dad - CHF  71 heavy smoker    Mom - healthy    Reviewed, no changes. SH:    Marital:  - x2, getting . limited caffeine. own [place]    Personal Habits: Cigarette Use: Does Not Smoke - occassional cigar. Alcohol: Drinks Alcoholic    Beverages Rarely. Social Determinants of Health     Financial Resource Strain: Unknown    Difficulty of Paying Living Expenses: Patient refused   Food Insecurity: Unknown    Worried About Running Out of Food in the Last Year: Patient refused    [address] in the Last Year: Patient refused   Transportation Needs:     Lack of Transportation (Medical):      Lack of Transportation (Non-Medical):    Physical Activity:     Days of Exercise per Week:     Minutes of Exercise per Session:    Stress:     Feeling of Stress :    Social Connections:     Frequency of Communication with Friends and Family:     Frequency of Social Gatherings with Friends and Family:     Attends Islam Services:     Active Member of Clubs or Organizations:     Attends Club or Organization Meetings:     Marital Status:    Intimate Partner Violence:     Fear of Current or Ex-Partner:     Emotionally Abused:     Physically Abused:     Sexually Abused:        Vitals:    10/11/21 0657   BP: 118/72   Temp: 97.2 °F (36.2 °C)   TempSrc: Temporal   Weight: 242 lb (109.8 kg)       Focused Lower Extremity Physical Exam:  Vitals:    10/11/21 0657   BP: 118/72   Temp: 97.2 °F (36.2 °C)        Foot Exam    General  General Appearance: appears stated age and healthy   Orientation: alert and oriented to person, place, and time       Right Foot/Ankle     Inspection and Palpation  Skin Exam: skin intact; Neurovascular  Dorsalis pedis: 3+  Posterior tibial: 3+  Saphenous nerve sensation: normal  Tibial nerve sensation: normal  Superficial peroneal nerve sensation: normal  Deep peroneal nerve sensation: normal  Sural nerve sensation: normal      Left Foot/Ankle      Inspection and Palpation  Skin Exam: skin intact;      Neurovascular  Dorsalis pedis: 3+  Posterior tibial: 3+  Saphenous nerve sensation: normal  Tibial nerve sensation: normal  Superficial peroneal nerve sensation: normal  Deep peroneal nerve sensation: normal  Sural nerve sensation: normal             Ortho Exam    Vascular: pulses  dp  pt   Capillary Refill Time:   Hair growth  Skin:    Edema:    Neurologic:      Musculoskeletal/ Orthopedic examination:    NAIL Toenail Description  Sites of Onychomycosis Involvement (Check affected area)  [x] [x] [x] [x] [x] [x] [x] [x] [x] [x]  5 4 3 2 1 1 2 3 4 5                          Right                                        Left    Thickness  [x] [x] [x] [x] [x] [x] [x] [x] [x] [x]  5 4 3 2 1 1 2 3 4 5                         Right                                        Left    Dystrophic Changes   [x] [x] [x] [x] [x] [x] [x] [x] [x] [x]  5 4 3 2 1 1 2 3 4 5                         Right                                        Left    discolored   [x] [x] [x] [x] [x] [x] [x] [x] [x] [x]  5 4 3 2 1 1 2 3 4 5                          Right                                        Left    Incurvation/Ingrowin   [] [] [] [] [] [] [] [] [] []  5 4 3 2 1 1 2 3 4 5                         Right                                        Left    Inflammation/Pain   [x] [x] [x] [x] [x] [x] [x] [x] [x] [x]  5 4 3 2 1 1 2 3 4 5                         Right Left    Web space  Derm:          Assessment and Plan: Can is off the Lamisil doing very well at this time we will continue monitoring the fungal nails. . There are no diagnoses linked to this encounter. No follow-ups on file. Seen By:  Martha Caro DPM      Document was created using voice recognition software. Note was reviewed, however may contain grammatical errors.

## 2022-01-10 ENCOUNTER — PROCEDURE VISIT (OUTPATIENT)
Dept: PODIATRY | Age: 62
End: 2022-01-10
Payer: COMMERCIAL

## 2022-01-10 VITALS
WEIGHT: 242 LBS | TEMPERATURE: 98 F | DIASTOLIC BLOOD PRESSURE: 72 MMHG | SYSTOLIC BLOOD PRESSURE: 118 MMHG | BODY MASS INDEX: 35.74 KG/M2

## 2022-01-10 DIAGNOSIS — L60.0 INGROWN NAIL: ICD-10-CM

## 2022-01-10 DIAGNOSIS — M79.675 PAIN IN LEFT TOE(S): ICD-10-CM

## 2022-01-10 DIAGNOSIS — B35.1 TINEA UNGUIUM: Primary | ICD-10-CM

## 2022-01-10 DIAGNOSIS — M79.674 PAIN IN TOE OF RIGHT FOOT: ICD-10-CM

## 2022-01-10 PROCEDURE — 99212 OFFICE O/P EST SF 10 MIN: CPT | Performed by: PODIATRIST

## 2022-01-10 NOTE — PROGRESS NOTES
21  Ashley Toribio : 1960 Sex: male  Age: 59 y.o. Patient was referred by Divya Diez MD    Chief Complaint   Patient presents with    Toe Pain     saw pcp Dr. Nakul Lockwood 10/11/2021    Ingrown Toenail       SUBJECTIVE patient is seen today for thick pitting mycotic nails ingrown nails. Toe Pain       Review of Systems  Const: Denies constitutional symptoms  Musculo: Denies symptoms other than stated above  Skin: Denies symptoms other than stated above       Current Outpatient Medications:     DULoxetine (CYMBALTA) 60 MG extended release capsule, Take 1 capsule by mouth daily, Disp: 30 capsule, Rfl: 12    omeprazole (PRILOSEC) 20 MG delayed release capsule, Take 1 capsule by mouth daily, Disp: 30 capsule, Rfl: 12    tadalafil (CIALIS) 5 MG tablet, Take 5 mg by mouth daily, Disp: , Rfl:   Allergies   Allergen Reactions    Prednisone Anxiety       Past Medical History:   Diagnosis Date    Disorder of prostate     Dysthymic disorder     GERD (gastroesophageal reflux disease)     Migraine     Tinea unguium     Vitamin D deficiency      Past Surgical History:   Procedure Laterality Date    CHOLECYSTECTOMY, LAPAROSCOPIC      HERNIA REPAIR       No family history on file. Social History     Socioeconomic History    Marital status:      Spouse name: Not on file    Number of children: Not on file    Years of education: Not on file    Highest education level: Not on file   Occupational History    Not on file   Tobacco Use    Smoking status: Never Smoker    Smokeless tobacco: Never Used   Vaping Use    Vaping Use: Never used   Substance and Sexual Activity    Alcohol use: No    Drug use: No    Sexual activity: Not on file   Other Topics Concern    Not on file   Social History Narrative    PMH:    Health Maintenance:    Influenza Vaccination - (2017)    Dr Benito Tucker - nl exam     Medical Problems:    Ulcers - Yosseff.  Has had EGD  (3 ulcers, erosion of stomach). repeat . repeat     Last colonoscopy , ,; nl. had 2010, nl. GERD    migraines - follows with Dr Sim Scales; typically around allergy season. started age 25. cervical DDD    overactive bladder - Dr Carver Semen spur - Dr Augusta Desai; surgery    Had measles/chicken pox    [facility] [place]    Surgical Hx:    Glenny    Hernia repair - Rt ; Dr Chavira [facility]    Vasectomy, T & A    Reviewed, no changes. FH:    Father:    Non Contributory. Mother:    Non Contributory. Brother 1 yr younger chron's    Dad - CHF  71 heavy smoker    Mom - healthy    Reviewed, no changes. SH:    Marital:  - x2, getting . limited caffeine. own [place]    Personal Habits: Cigarette Use: Does Not Smoke - occassional cigar. Alcohol: Drinks Alcoholic    Beverages Rarely. Social Determinants of Health     Financial Resource Strain: Unknown    Difficulty of Paying Living Expenses: Patient refused   Food Insecurity: Unknown    Worried About Running Out of Food in the Last Year: Patient refused    [address] in the Last Year: Patient refused   Transportation Needs:     Lack of Transportation (Medical): Not on file    Lack of Transportation (Non-Medical):  Not on file   Physical Activity:     Days of Exercise per Week: Not on file    Minutes of Exercise per Session: Not on file   Stress:     Feeling of Stress : Not on file   Social Connections:     Frequency of Communication with Friends and Family: Not on file    Frequency of Social Gatherings with Friends and Family: Not on file    Attends Scientology Services: Not on file    Active Member of Clubs or Organizations: Not on file    Attends Club or Organization Meetings: Not on file    Marital Status: Not on file   Intimate Partner Violence:     Fear of Current or Ex-Partner: Not on file    Emotionally Abused: Not on file    Physically Abused: Not on file    Sexually Abused: Not on file   Housing Stability:     Unable to Pay for Housing in the Last Year: Not on file    Number of Places Lived in the Last Year: Not on file    Unstable Housing in the Last Year: Not on file       Vitals:    01/10/22 0652   BP: 118/72   Temp: 98 °F (36.7 °C)   TempSrc: Temporal   Weight: 242 lb (109.8 kg)       Focused Lower Extremity Physical Exam:  Vitals:    01/10/22 0652   BP: 118/72   Temp: 98 °F (36.7 °C)        Foot Exam    General  General Appearance: appears stated age and healthy   Orientation: alert and oriented to person, place, and time       Right Foot/Ankle     Inspection and Palpation  Skin Exam: skin intact; Neurovascular  Dorsalis pedis: 3+  Posterior tibial: 3+  Saphenous nerve sensation: normal  Tibial nerve sensation: normal  Superficial peroneal nerve sensation: normal  Deep peroneal nerve sensation: normal  Sural nerve sensation: normal      Left Foot/Ankle      Inspection and Palpation  Skin Exam: skin intact;      Neurovascular  Dorsalis pedis: 3+  Posterior tibial: 3+  Saphenous nerve sensation: normal  Tibial nerve sensation: normal  Superficial peroneal nerve sensation: normal  Deep peroneal nerve sensation: normal  Sural nerve sensation: normal             Ortho Exam    Vascular: pulses  dp  pt   Capillary Refill Time:   Hair growth  Skin:    Edema:    Neurologic:      Musculoskeletal/ Orthopedic examination:    NAIL Toenail Description  Sites of Onychomycosis Involvement (Check affected area)  [x] [x] [x] [x] [x] [x] [x] [x] [x] [x]  5 4 3 2 1 1 2 3 4 5                          Right                                        Left    Thickness  [x] [x] [x] [x] [x] [x] [x] [x] [x] [x]  5 4 3 2 1 1 2 3 4 5                         Right                                        Left    Dystrophic Changes   [x] [x] [x] [x] [x] [x] [x] [x] [x] [x]  5 4 3 2 1 1 2 3 4 5                         Right                                        Left    discolored   [x] [x] [x] [x] [x] [x] [x] [x] [x] [x]  5 4 3 2 1 1 2 3 4 5 Right                                        Left    Incurvation/Ingrowin   [] [] [] [] [] [x] [x] [] [] []  5 4 3 2 1 1 2 3 4 5                         Right                                        Left    Inflammation/Pain   [x] [x] [x] [x] [x] [x] [x] [x] [x] [x]  5 4 3 2 1 1 2 3 4 5                         Right                                        Left    Web space  Derm:          Assessment and Plan: Can is off the Lamisil doing very well at this time we will continue monitoring the fungal nails. Rosa M Ronquillo was seen today for toe pain and ingrown toenail. Diagnoses and all orders for this visit:    Tinea unguium    Pain in left toe(s)    Pain in toe of right foot    Ingrown nail        No follow-ups on file. Seen By:  Koko Claros DPM      Document was created using voice recognition software. Note was reviewed, however may contain grammatical errors.

## 2022-01-18 RX ORDER — PREDNISONE 10 MG/1
10 TABLET ORAL DAILY
Qty: 18 TABLET | Refills: 1 | Status: SHIPPED | OUTPATIENT
Start: 2022-01-18 | End: 2022-01-27

## 2022-02-11 DIAGNOSIS — R73.9 HYPERGLYCEMIA: ICD-10-CM

## 2022-02-11 DIAGNOSIS — E55.9 VITAMIN D DEFICIENCY: ICD-10-CM

## 2022-02-11 DIAGNOSIS — N42.9 PROSTATE DISORDER: ICD-10-CM

## 2022-02-11 DIAGNOSIS — E78.2 MIXED HYPERLIPIDEMIA: ICD-10-CM

## 2022-02-11 LAB
ALBUMIN SERPL-MCNC: 4.2 G/DL (ref 3.5–5.2)
ALP BLD-CCNC: 53 U/L (ref 40–129)
ALT SERPL-CCNC: 19 U/L (ref 0–40)
ANION GAP SERPL CALCULATED.3IONS-SCNC: 16 MMOL/L (ref 7–16)
AST SERPL-CCNC: 20 U/L (ref 0–39)
BACTERIA: NORMAL /HPF
BASOPHILS ABSOLUTE: 0.04 E9/L (ref 0–0.2)
BASOPHILS RELATIVE PERCENT: 0.8 % (ref 0–2)
BILIRUB SERPL-MCNC: 0.5 MG/DL (ref 0–1.2)
BILIRUBIN URINE: NEGATIVE
BLOOD, URINE: NEGATIVE
BUN BLDV-MCNC: 27 MG/DL (ref 6–23)
CALCIUM SERPL-MCNC: 9.4 MG/DL (ref 8.6–10.2)
CHLORIDE BLD-SCNC: 107 MMOL/L (ref 98–107)
CHOLESTEROL, TOTAL: 183 MG/DL (ref 0–199)
CLARITY: CLEAR
CO2: 22 MMOL/L (ref 22–29)
COLOR: YELLOW
CREAT SERPL-MCNC: 1 MG/DL (ref 0.7–1.2)
EOSINOPHILS ABSOLUTE: 0.16 E9/L (ref 0.05–0.5)
EOSINOPHILS RELATIVE PERCENT: 3.2 % (ref 0–6)
GFR AFRICAN AMERICAN: >60
GFR NON-AFRICAN AMERICAN: >60 ML/MIN/1.73
GLUCOSE BLD-MCNC: 117 MG/DL (ref 74–99)
GLUCOSE URINE: NEGATIVE MG/DL
HBA1C MFR BLD: 5.9 % (ref 4–5.6)
HCT VFR BLD CALC: 42.4 % (ref 37–54)
HDLC SERPL-MCNC: 43 MG/DL
HEMOGLOBIN: 13.6 G/DL (ref 12.5–16.5)
IMMATURE GRANULOCYTES #: 0.01 E9/L
IMMATURE GRANULOCYTES %: 0.2 % (ref 0–5)
KETONES, URINE: NEGATIVE MG/DL
LDL CHOLESTEROL CALCULATED: 120 MG/DL (ref 0–99)
LEUKOCYTE ESTERASE, URINE: ABNORMAL
LYMPHOCYTES ABSOLUTE: 1.6 E9/L (ref 1.5–4)
LYMPHOCYTES RELATIVE PERCENT: 32.1 % (ref 20–42)
MCH RBC QN AUTO: 29.6 PG (ref 26–35)
MCHC RBC AUTO-ENTMCNC: 32.1 % (ref 32–34.5)
MCV RBC AUTO: 92.2 FL (ref 80–99.9)
MONOCYTES ABSOLUTE: 0.54 E9/L (ref 0.1–0.95)
MONOCYTES RELATIVE PERCENT: 10.8 % (ref 2–12)
NEUTROPHILS ABSOLUTE: 2.63 E9/L (ref 1.8–7.3)
NEUTROPHILS RELATIVE PERCENT: 52.9 % (ref 43–80)
NITRITE, URINE: NEGATIVE
PDW BLD-RTO: 13.4 FL (ref 11.5–15)
PH UA: 6.5 (ref 5–9)
PLATELET # BLD: 204 E9/L (ref 130–450)
PMV BLD AUTO: 10.8 FL (ref 7–12)
POTASSIUM SERPL-SCNC: 4.8 MMOL/L (ref 3.5–5)
PROSTATE SPECIFIC ANTIGEN: 2.97 NG/ML (ref 0–4)
PROTEIN UA: NEGATIVE MG/DL
RBC # BLD: 4.6 E12/L (ref 3.8–5.8)
RBC UA: NORMAL /HPF (ref 0–2)
SODIUM BLD-SCNC: 145 MMOL/L (ref 132–146)
SPECIFIC GRAVITY UA: 1.02 (ref 1–1.03)
TOTAL PROTEIN: 6.8 G/DL (ref 6.4–8.3)
TRIGL SERPL-MCNC: 102 MG/DL (ref 0–149)
TSH SERPL DL<=0.05 MIU/L-ACNC: 1.34 UIU/ML (ref 0.27–4.2)
UROBILINOGEN, URINE: 0.2 E.U./DL
VITAMIN D 25-HYDROXY: 22 NG/ML (ref 30–100)
VLDLC SERPL CALC-MCNC: 20 MG/DL
WBC # BLD: 5 E9/L (ref 4.5–11.5)
WBC UA: NORMAL /HPF (ref 0–5)

## 2022-02-14 ENCOUNTER — OFFICE VISIT (OUTPATIENT)
Dept: PRIMARY CARE CLINIC | Age: 62
End: 2022-02-14
Payer: COMMERCIAL

## 2022-02-14 VITALS
WEIGHT: 256 LBS | TEMPERATURE: 97.8 F | SYSTOLIC BLOOD PRESSURE: 132 MMHG | HEART RATE: 68 BPM | OXYGEN SATURATION: 95 % | DIASTOLIC BLOOD PRESSURE: 70 MMHG | BODY MASS INDEX: 37.8 KG/M2

## 2022-02-14 DIAGNOSIS — F32.A ANXIETY AND DEPRESSION: ICD-10-CM

## 2022-02-14 DIAGNOSIS — R73.9 HYPERGLYCEMIA: ICD-10-CM

## 2022-02-14 DIAGNOSIS — E66.09 CLASS 2 OBESITY DUE TO EXCESS CALORIES WITHOUT SERIOUS COMORBIDITY WITH BODY MASS INDEX (BMI) OF 36.0 TO 36.9 IN ADULT: ICD-10-CM

## 2022-02-14 DIAGNOSIS — K21.9 GASTROESOPHAGEAL REFLUX DISEASE, UNSPECIFIED WHETHER ESOPHAGITIS PRESENT: ICD-10-CM

## 2022-02-14 DIAGNOSIS — Z23 ENCOUNTER FOR IMMUNIZATION: ICD-10-CM

## 2022-02-14 DIAGNOSIS — F41.9 ANXIETY AND DEPRESSION: ICD-10-CM

## 2022-02-14 DIAGNOSIS — N42.9 PROSTATE DISORDER: Primary | ICD-10-CM

## 2022-02-14 DIAGNOSIS — Z11.4 ENCOUNTER FOR SCREENING FOR HIV: ICD-10-CM

## 2022-02-14 DIAGNOSIS — Z00.00 HEALTH MAINTENANCE EXAMINATION: ICD-10-CM

## 2022-02-14 DIAGNOSIS — Z11.59 NEED FOR HEPATITIS C SCREENING TEST: ICD-10-CM

## 2022-02-14 DIAGNOSIS — E55.9 VITAMIN D DEFICIENCY: ICD-10-CM

## 2022-02-14 DIAGNOSIS — E78.2 MIXED HYPERLIPIDEMIA: ICD-10-CM

## 2022-02-14 PROCEDURE — 90674 CCIIV4 VAC NO PRSV 0.5 ML IM: CPT | Performed by: FAMILY MEDICINE

## 2022-02-14 PROCEDURE — 99214 OFFICE O/P EST MOD 30 MIN: CPT | Performed by: FAMILY MEDICINE

## 2022-02-14 PROCEDURE — 90471 IMMUNIZATION ADMIN: CPT | Performed by: FAMILY MEDICINE

## 2022-02-14 RX ORDER — OMEPRAZOLE 20 MG/1
20 CAPSULE, DELAYED RELEASE ORAL DAILY
Qty: 30 CAPSULE | Refills: 12 | Status: SHIPPED
Start: 2022-02-14 | End: 2022-09-23 | Stop reason: SDUPTHER

## 2022-02-14 RX ORDER — DULOXETIN HYDROCHLORIDE 60 MG/1
60 CAPSULE, DELAYED RELEASE ORAL DAILY
Qty: 30 CAPSULE | Refills: 12 | Status: SHIPPED
Start: 2022-02-14 | End: 2022-09-23 | Stop reason: SDUPTHER

## 2022-02-14 RX ORDER — ROSUVASTATIN CALCIUM 10 MG/1
10 TABLET, COATED ORAL
Qty: 30 TABLET | Refills: 1 | Status: SHIPPED
Start: 2022-02-14 | End: 2022-05-03

## 2022-02-14 ASSESSMENT — PATIENT HEALTH QUESTIONNAIRE - PHQ9
SUM OF ALL RESPONSES TO PHQ QUESTIONS 1-9: 0
SUM OF ALL RESPONSES TO PHQ QUESTIONS 1-9: 0
9. THOUGHTS THAT YOU WOULD BE BETTER OFF DEAD, OR OF HURTING YOURSELF: 0
5. POOR APPETITE OR OVEREATING: 0
SUM OF ALL RESPONSES TO PHQ QUESTIONS 1-9: 0
10. IF YOU CHECKED OFF ANY PROBLEMS, HOW DIFFICULT HAVE THESE PROBLEMS MADE IT FOR YOU TO DO YOUR WORK, TAKE CARE OF THINGS AT HOME, OR GET ALONG WITH OTHER PEOPLE: 0
4. FEELING TIRED OR HAVING LITTLE ENERGY: 0
7. TROUBLE CONCENTRATING ON THINGS, SUCH AS READING THE NEWSPAPER OR WATCHING TELEVISION: 0
8. MOVING OR SPEAKING SO SLOWLY THAT OTHER PEOPLE COULD HAVE NOTICED. OR THE OPPOSITE, BEING SO FIGETY OR RESTLESS THAT YOU HAVE BEEN MOVING AROUND A LOT MORE THAN USUAL: 0
SUM OF ALL RESPONSES TO PHQ QUESTIONS 1-9: 0
6. FEELING BAD ABOUT YOURSELF - OR THAT YOU ARE A FAILURE OR HAVE LET YOURSELF OR YOUR FAMILY DOWN: 0
2. FEELING DOWN, DEPRESSED OR HOPELESS: 0
3. TROUBLE FALLING OR STAYING ASLEEP: 0

## 2022-02-14 NOTE — PROGRESS NOTES
Guerrero Patiño : 1960 Sex: male  Age: 59 y.o. Chief Complaint   Patient presents with    6 Month Follow-Up    Discuss Labs       HPI:   He is here for follow-up, generally feeling well. No acute complaints or concerns. Needs refills on medications.   Following with urology  CMP showed BUN 27 glucose 117 with a hemoglobin A1c of 5.9, cannot tolerate Metformin, GI symptoms, LDL up to 120 HDL 43 triglyceride 102 TSH 1.3 vitamin D 22 CBC with differential normal PSA went from 4.6-2.97 urinalysis negative      The 10-year ASCVD risk score (Yaneth Rangel., et al., 2013) is: 11.8%    Values used to calculate the score:      Age: 59 years      Sex: Male      Is Non- : No      Diabetic: No      Tobacco smoker: No      Systolic Blood Pressure: 009 mmHg      Is BP treated: No      HDL Cholesterol: 43 mg/dL      Total Cholesterol: 183 mg/dL        Most Recent Labs  CBC  Lab Results   Component Value Date    WBC 5.0 2022    WBC 4.9 08/10/2021    WBC 5.4 2020    RBC 4.60 2022    RBC 4.58 08/10/2021    RBC 4.79 2020    HGB 13.6 2022    HGB 13.6 08/10/2021    HGB 14.0 2020    HCT 42.4 2022    HCT 42.4 08/10/2021    HCT 43.7 2020    MCV 92.2 2022    MCV 92.6 08/10/2021    MCV 91.2 2020     2022     08/10/2021     2020      CMP  Lab Results   Component Value Date     2022     2021     2020    K 4.8 2022    K 4.2 2021    K 4.1 2020     2022     2021     2020    CO2 22 2022    CO2 25 2021    CO2 23 2020    ANIONGAP 16 2022    ANIONGAP 11 2021    ANIONGAP 15 2020    GLUCOSE 117 2022    GLUCOSE 117 2021    GLUCOSE 115 2020    BUN 27 2022    BUN 22 2021    BUN 22 2020    CREATININE 1.0 2022    CREATININE 1.0 2021    CREATININE 1.0 08/07/2020    CREATININE 1.0 01/17/2019    LABGLOM >60 02/11/2022    LABGLOM >60 07/08/2021    LABGLOM >60 08/07/2020    GFRAA >60 02/11/2022    GFRAA >60 07/08/2021    GFRAA >60 08/07/2020    CALCIUM 9.4 02/11/2022    CALCIUM 9.4 07/08/2021    CALCIUM 9.3 08/07/2020    PROT 6.8 02/11/2022    PROT 7.0 07/08/2021    PROT 6.7 08/07/2020    LABALBU 4.2 02/11/2022    LABALBU 4.2 07/08/2021    LABALBU 4.1 08/07/2020    BILITOT 0.5 02/11/2022    BILITOT 0.5 07/08/2021    BILITOT 0.5 08/07/2020    ALKPHOS 53 02/11/2022    ALKPHOS 63 07/08/2021    ALKPHOS 58 08/07/2020    AST 20 02/11/2022    AST 20 07/08/2021    AST 23 08/07/2020    ALT 19 02/11/2022    ALT 18 07/08/2021    ALT 31 08/07/2020     A1C  Lab Results   Component Value Date    LABA1C 5.9 02/11/2022    LABA1C 5.7 07/08/2021    LABA1C 6.1 08/07/2020     TSH  Lab Results   Component Value Date    TSH 1.340 02/11/2022    TSH 1.280 08/10/2021    TSH 1.660 08/07/2020     FREET4  No results found for: X9TVIMF  LIPID  Lab Results   Component Value Date    CHOL 183 02/11/2022    CHOL 169 08/10/2021    CHOL 185 08/07/2020    HDL 43 02/11/2022    HDL 45 08/10/2021    HDL 42 08/07/2020    LDLCALC 120 02/11/2022    LDLCALC 110 08/10/2021    LDLCALC 126 08/07/2020    TRIG 102 02/11/2022    TRIG 68 08/10/2021    TRIG 87 08/07/2020    CHOLHDLRATIO 4.4 01/17/2019     VITAMIN D  Lab Results   Component Value Date    VITD25 22 02/11/2022    VITD25 29 08/10/2021    VITD25 27 08/07/2020     MAGNESIUM  No results found for: MG   PHOS  No results found for: PHOS   ASHU   No results found for: ASHU  RHEUMATOID FACTOR  No results found for: RF  PSA  Lab Results   Component Value Date    PSA 2.97 02/11/2022    PSA 4.62 08/10/2021    PSA 3.10 05/15/2020      HEPATITIS C  No results found for: HCVABI  HIV  No results found for: RLX1BFG, HIV1QT  UA  Lab Results   Component Value Date    COLORU Yellow 02/11/2022    COLORU Yellow 08/10/2021    COLORU Yellow 08/07/2020    CLARITYU Clear 02/11/2022    CLARITYU Clear 08/10/2021    CLARITYU Clear 08/07/2020    GLUCOSEU Negative 02/11/2022    GLUCOSEU Negative 08/10/2021    GLUCOSEU Negative 08/07/2020    BILIRUBINUR Negative 02/11/2022    BILIRUBINUR Negative 08/10/2021    BILIRUBINUR Negative 08/07/2020    KETUA Negative 02/11/2022    KETUA Negative 08/10/2021    KETUA Negative 08/07/2020    SPECGRAV 1.020 02/11/2022    SPECGRAV 1.020 08/10/2021    SPECGRAV 1.020 08/07/2020    BLOODU Negative 02/11/2022    BLOODU Negative 08/10/2021    BLOODU Negative 08/07/2020    PHUR 6.5 02/11/2022    PHUR 6.5 08/10/2021    PHUR 6.0 08/07/2020    PROTEINU Negative 02/11/2022    PROTEINU Negative 08/10/2021    PROTEINU Negative 08/07/2020    UROBILINOGEN 0.2 02/11/2022    UROBILINOGEN 1.0 08/10/2021    UROBILINOGEN 0.2 08/07/2020    NITRU Negative 02/11/2022    NITRU Negative 08/10/2021    NITRU Negative 08/07/2020    LEUKOCYTESUR TRACE 02/11/2022    LEUKOCYTESUR Negative 08/10/2021    LEUKOCYTESUR Negative 08/07/2020     Urine Micro/Albumin Ratio  No results found for: MALBCR      Review of Systems  ROS:  Const: Denies chills, fever, malaise and sweats. Eyes: Denies discharge, pain, redness and visual disturbance. ENMT: Denies earaches, other ear symptoms. Denies nasal or sinus symptoms other than stated  above. Denies mouth and tongue lesions and sore throat. CV: Denies chest discomfort, pain; diaphoresis, dizziness, edema, lightheadedness, orthopnea,  palpitations, syncope and near syncopal episode or any exertional symptoms  Resp: Denies cough, hemoptysis, pleuritic pain, SOB, sputum production and wheezing. GI: Denies abdominal pain, change in bowel habits, hematochezia, melena, nausea and vomiting. : Denies urinary symptoms including dysuria , urgency, frequency or hematuria. Musculo: Denies musculoskeletal symptoms. Skin: Denies bruising and rash.   Neuro: Denies headache, numbness, stiff neck, tingling and focal weakness slurred speech or no changes. SH:    Marital:  - x2, getting . limited caffeine. own [place]    Personal Habits: Cigarette Use: Does Not Smoke - occassional cigar. Alcohol: Drinks Alcoholic    Beverages Rarely. Vitals:    02/14/22 0808   BP: 132/70   Pulse: 68   Temp: 97.8 °F (36.6 °C)   SpO2: 95%   Weight: 256 lb (116.1 kg)       Physical Exam  Exam:  Const: Appears comfortable. No signs of acute distress present. Head/Face: Atraumatic on inspection. Eyes: EOMI in both eyes. No discharge from the eyes. PERRL. Sclerae clear. ENMT: Auditory canals normal. Tympanic membranes: intact and translucent. External nose WNL. Nasal mucosa is clear. Oropharynx: No erythema or exudate. Posterior pharynx is normal.  Neck: Supple. Palpation reveals no adenopathy. No masses appreciated. No JVD. Carotids: no  bruits. Resp: Respirations are unlabored. Clear to auscultation. No rales, rhonchi or wheezes appreciated  over the lungs bilaterally. CV: Rate is regular. Rhythm is regular. No gallop or rubs. No heart murmur appreciated. Extremities: No clubbing, cyanosis, or edema. No calf inflammation or tenderness. Abdomen: Bowel sounds are normoactive. Abdomen is soft, nontender, and nondistended. No  abdominal masses. No palpable hepatosplenomegaly. Genitalia: defers to urology  Rectal: defers urology  Lymph: No palpable or visible regional lymphadenopathy. Musculoskeletal: no acute joint inflammation. Skin: Dry and warm with no rash. Skin normal to inspection and palpation overall. Neuro: Alert and oriented. Affect: appropriate. Upper Extremities: 5/5 bilaterally. Lower Extremities:  5/5 bilaterally. Sensation intact to light touch. Reflexes: DTR's are symmetric and 2+ bilaterally. .  Cranial Nerves: Cranial nerves grossly intact. Genitalia/rectal - defers to Dr Woo Ross (Saw 5/20 and had PSA)    Assessment and Plan:   Diagnosis Orders   1. Prostate disorder  PSA, Diagnostic    CBC Auto Differential   2.  Mixed hyperlipidemia  Lipid Panel    TSH without Reflex    Comprehensive Metabolic Panel    CBC Auto Differential    CK    Lipid Panel    Comprehensive Metabolic Panel    CK    rosuvastatin (CRESTOR) 10 MG tablet   3. Vitamin D deficiency  Vitamin D 25 Hydroxy   4. Hyperglycemia  TSH without Reflex    Comprehensive Metabolic Panel    CBC Auto Differential    Hemoglobin A1C    Urinalysis    Microalbumin / Creatinine Urine Ratio   5. Health maintenance examination     6. Anxiety and depression  TSH without Reflex    DULoxetine (CYMBALTA) 60 MG extended release capsule   7. Gastroesophageal reflux disease, unspecified whether esophagitis present  CBC Auto Differential    omeprazole (PRILOSEC) 20 MG delayed release capsule   8. Class 2 obesity due to excess calories without serious comorbidity with body mass index (BMI) of 36.0 to 36.9 in adult     9. Encounter for screening for HIV  HIV Screen   10. Need for hepatitis C screening test  Hepatitis C Antibody   11. Encounter for immunization  INFLUENZA, MDCK QUADV, 2 YRS AND OLDER, IM, PF, PREFILL SYR OR SDV, 0.5ML (FLUCELVAX QUADV, PF)          No problem-specific Assessment & Plan notes found for this encounter. Health maintenance examination  Health maintenance issues discussed at length as above, 8/16/2021. Encouraged yearly physicals. Willing to have hepatitis C/HIV screen-defers other STI testing      Metabolic syndrome  Counseled extensively. Lifestyle modification reviewed. Importance of diet exercise and weight loss reviewed. Micro-macrovascular complications reviewed. Stable. He was intolerant to Metformin XR and we will monitor at this time, defers other pharmacotherapy at this time. Monitor. Hyperglycemia  Counseled. See comments under metabolic syndrome. Gastroesophageal reflux disease without esophagitis  Patient completely asymptomatic, followed with Dr. villa.   Omeprazole 20 mg daily, previously 40 mg with risks benefits reviewed including masking underlying etiologies, risk of calcium else works and renal insufficiency, C. diff and otherwise. Had endoscopy around 2018 appropriate timing of dosing of PPI reviewed. Also notices benefit on lactose-free. Discussed if symptom-free he may wean and stop this notify us if any recurrence     Dysthymia  Stable and asymptomatic on medication. Tolerating well. Risk benefits and ADRs reviewed. Appropriate counseling reviewed. Adamantly denies SI/HI. Appropriate timing of dosing of Cymbalta reviewed.     Disorder of prostate  ASX. Following with Dr. Benito Tucker,, had MRI that was negative he states, follow-up    Vitamin D deficiency  Appropriate supplementation reviewed       Mixed hyperlipidemia  Elevated ASCVD  Calculator, risks benefits of meds reviewed, risk of hyperlipidemia reviewed, lifestyle modification reviewed, counseled vitamin D/coenzyme every 10. After discussion willing to start Crestor with precautions, side effects instructions ADRs interactions reviewed, discontinue if any, blood work 1 month follow-up 5 weeks sooner as needed        Plan as above. Counseled extensively and differential diagnoses relevant to above were reviewed, including serious etiologies. Side effects and interactions of medications were reviewed. Counseled. Cont per multiple specialists incl urology, start Crestor , Refilled other meds, blood work 1 month follow-up 5 weeks sooner as needed. Flu vaccine today. Precautions reviewed. As long as symptoms steadily improve/resolve, and medical conditions follow the expected course, FU as below, sooner PRN. Return in about 5 weeks (around 3/21/2022), or if symptoms worsen or fail to improve. Signs and symptoms to watch for discussed, serious signs and symptoms reviewed. ER if any.      Over 30 minutes  spent with the patient in reviewing records, reviewing with patient/family, counseling, ordering,  prescribing, completing h&p, etc., with over 50% of the time spent face to face counseling. Garo Tian MD    Patients are advised to check with insurance company to ensure coverage and to fully understand benefits and cost prior to any testing. This note was created with the assistance of voice recognition software. Document was reviewed however may contain grammatical errors.

## 2022-03-15 DIAGNOSIS — Z11.4 ENCOUNTER FOR SCREENING FOR HIV: ICD-10-CM

## 2022-03-15 DIAGNOSIS — E78.2 MIXED HYPERLIPIDEMIA: ICD-10-CM

## 2022-03-15 DIAGNOSIS — Z11.59 NEED FOR HEPATITIS C SCREENING TEST: ICD-10-CM

## 2022-03-15 LAB
ALBUMIN SERPL-MCNC: 3.9 G/DL (ref 3.5–5.2)
ALP BLD-CCNC: 56 U/L (ref 40–129)
ALT SERPL-CCNC: 16 U/L (ref 0–40)
ANION GAP SERPL CALCULATED.3IONS-SCNC: 10 MMOL/L (ref 7–16)
AST SERPL-CCNC: 18 U/L (ref 0–39)
BILIRUB SERPL-MCNC: 0.3 MG/DL (ref 0–1.2)
BUN BLDV-MCNC: 25 MG/DL (ref 6–23)
CALCIUM SERPL-MCNC: 9 MG/DL (ref 8.6–10.2)
CHLORIDE BLD-SCNC: 106 MMOL/L (ref 98–107)
CHOLESTEROL, TOTAL: 168 MG/DL (ref 0–199)
CO2: 24 MMOL/L (ref 22–29)
CREAT SERPL-MCNC: 0.9 MG/DL (ref 0.7–1.2)
GFR AFRICAN AMERICAN: >60
GFR NON-AFRICAN AMERICAN: >60 ML/MIN/1.73
GLUCOSE BLD-MCNC: 105 MG/DL (ref 74–99)
HDLC SERPL-MCNC: 38 MG/DL
LDL CHOLESTEROL CALCULATED: 116 MG/DL (ref 0–99)
POTASSIUM SERPL-SCNC: 4.6 MMOL/L (ref 3.5–5)
SODIUM BLD-SCNC: 140 MMOL/L (ref 132–146)
TOTAL CK: 101 U/L (ref 20–200)
TOTAL PROTEIN: 6.6 G/DL (ref 6.4–8.3)
TRIGL SERPL-MCNC: 71 MG/DL (ref 0–149)
VLDLC SERPL CALC-MCNC: 14 MG/DL

## 2022-03-16 LAB
HEPATITIS C ANTIBODY INTERPRETATION: NORMAL
HIV-1 AND HIV-2 ANTIBODIES: NORMAL

## 2022-03-21 ENCOUNTER — PROCEDURE VISIT (OUTPATIENT)
Dept: PODIATRY | Age: 62
End: 2022-03-21
Payer: COMMERCIAL

## 2022-03-21 VITALS
TEMPERATURE: 98.2 F | SYSTOLIC BLOOD PRESSURE: 126 MMHG | BODY MASS INDEX: 37.8 KG/M2 | WEIGHT: 256 LBS | DIASTOLIC BLOOD PRESSURE: 78 MMHG

## 2022-03-21 DIAGNOSIS — M79.675 PAIN IN LEFT TOE(S): ICD-10-CM

## 2022-03-21 DIAGNOSIS — G89.29 CHRONIC PAIN OF BOTH KNEES: Primary | ICD-10-CM

## 2022-03-21 DIAGNOSIS — M25.561 CHRONIC PAIN OF BOTH KNEES: Primary | ICD-10-CM

## 2022-03-21 DIAGNOSIS — M25.562 CHRONIC PAIN OF BOTH KNEES: Primary | ICD-10-CM

## 2022-03-21 DIAGNOSIS — M72.2 BILATERAL PLANTAR FASCIITIS: ICD-10-CM

## 2022-03-21 PROCEDURE — 20550 NJX 1 TENDON SHEATH/LIGAMENT: CPT | Performed by: PODIATRIST

## 2022-03-21 PROCEDURE — 99213 OFFICE O/P EST LOW 20 MIN: CPT | Performed by: PODIATRIST

## 2022-03-21 RX ORDER — BUPIVACAINE HYDROCHLORIDE 5 MG/ML
1 INJECTION, SOLUTION PERINEURAL ONCE
Status: COMPLETED | OUTPATIENT
Start: 2022-03-21 | End: 2022-03-21

## 2022-03-21 RX ORDER — BETAMETHASONE SODIUM PHOSPHATE AND BETAMETHASONE ACETATE 3; 3 MG/ML; MG/ML
4 INJECTION, SUSPENSION INTRA-ARTICULAR; INTRALESIONAL; INTRAMUSCULAR; SOFT TISSUE ONCE
Status: COMPLETED | OUTPATIENT
Start: 2022-03-21 | End: 2022-03-21

## 2022-03-21 RX ADMIN — BUPIVACAINE HYDROCHLORIDE 5 MG: 5 INJECTION, SOLUTION PERINEURAL at 08:36

## 2022-03-21 RX ADMIN — BETAMETHASONE SODIUM PHOSPHATE AND BETAMETHASONE ACETATE 4 MG: 3; 3 INJECTION, SUSPENSION INTRA-ARTICULAR; INTRALESIONAL; INTRAMUSCULAR; SOFT TISSUE at 08:35

## 2022-03-21 NOTE — PROGRESS NOTES
3/21/22  Windy Dawn : 1960 Sex: male  Age: 59 y.o. Patient was referred by Kareen Hanson MD    Chief Complaint   Patient presents with    Toe Pain     saw pcp Dr. Argenis Moss 22    Foot Pain     left heel burning pain had prednisone last month it did not help        SUBJECTIVE patient is seen today for left foot pain patient had 1 round of prednisone with no improvement. HPI  Review of Systems  Const: Denies constitutional symptoms  Musculo: Denies symptoms other than stated above  Skin: Denies symptoms other than stated above       Current Outpatient Medications:     rosuvastatin (CRESTOR) 10 MG tablet, Take 1 tablet by mouth Daily with supper, Disp: 30 tablet, Rfl: 1    DULoxetine (CYMBALTA) 60 MG extended release capsule, Take 1 capsule by mouth daily, Disp: 30 capsule, Rfl: 12    omeprazole (PRILOSEC) 20 MG delayed release capsule, Take 1 capsule by mouth daily, Disp: 30 capsule, Rfl: 12    tadalafil (CIALIS) 5 MG tablet, Take 5 mg by mouth daily, Disp: , Rfl:   Allergies   Allergen Reactions    Prednisone Anxiety       Past Medical History:   Diagnosis Date    Disorder of prostate     Dysthymic disorder     GERD (gastroesophageal reflux disease)     Migraine     Tinea unguium     Vitamin D deficiency      Past Surgical History:   Procedure Laterality Date    CHOLECYSTECTOMY, LAPAROSCOPIC      HERNIA REPAIR       No family history on file.   Social History     Socioeconomic History    Marital status:      Spouse name: Not on file    Number of children: Not on file    Years of education: Not on file    Highest education level: Not on file   Occupational History    Not on file   Tobacco Use    Smoking status: Never Smoker    Smokeless tobacco: Never Used   Vaping Use    Vaping Use: Never used   Substance and Sexual Activity    Alcohol use: No    Drug use: No    Sexual activity: Not on file   Other Topics Concern    Not on file   Social History Narrative PMH:    Health Maintenance:    Influenza Vaccination - (2017)    Dr Sandi Rendon - nl exam     Medical Problems:    Ulcers - [place]. Has had EGD  (3 ulcers, erosion of stomach). repeat . repeat     Last colonoscopy , ,; nl. had , nl. GERD    migraines - follows with Dr Kari Sahu; typically around allergy season. started age 25. cervical DDD    overactive bladder - Dr Jazlyn baumann - Dr Shanice Varela; surgery    Had measles/chicken pox    [facility] [place]    Surgical Hx:    [place]    Hernia repair - Rt ; Dr Cait GUARDADO [facility]    Vasectomy, T & A    Reviewed, no changes. FH:    Father:    Non Contributory. Mother:    Non Contributory. Brother 1 yr younger chron's    Dad - CHF  71 heavy smoker    Mom - healthy    Reviewed, no changes. SH:    Marital:  - x2, getting . limited caffeine. own [place]    Personal Habits: Cigarette Use: Does Not Smoke - occassional cigar. Alcohol: Drinks Alcoholic    Beverages Rarely. Social Determinants of Health     Financial Resource Strain: Unknown    Difficulty of Paying Living Expenses: Patient refused   Food Insecurity: Unknown    Worried About Running Out of Food in the Last Year: Patient refused    [address] in the Last Year: Patient refused   Transportation Needs:     Lack of Transportation (Medical): Not on file    Lack of Transportation (Non-Medical):  Not on file   Physical Activity:     Days of Exercise per Week: Not on file    Minutes of Exercise per Session: Not on file   Stress:     Feeling of Stress : Not on file   Social Connections:     Frequency of Communication with Friends and Family: Not on file    Frequency of Social Gatherings with Friends and Family: Not on file    Attends Adventism Services: Not on file    Active Member of Clubs or Organizations: Not on file    Attends Club or Organization Meetings: Not on file    Marital Status: Not on file   Intimate Partner Violence:     Fear of Current or Ex-Partner: Not on file    Emotionally Abused: Not on file    Physically Abused: Not on file    Sexually Abused: Not on file   Housing Stability:     Unable to Pay for Housing in the Last Year: Not on file    Number of Places Lived in the Last Year: Not on file    Unstable Housing in the Last Year: Not on file       Vitals:    03/21/22 0727   BP: 126/78   Temp: 98.2 °F (36.8 °C)   TempSrc: Temporal   Weight: 256 lb (116.1 kg)       Focused Lower Extremity Physical Exam:  Vitals:    03/21/22 0727   BP: 126/78   Temp: 98.2 °F (36.8 °C)        Foot Exam    General  General Appearance: appears stated age and healthy   Orientation: alert and oriented to person, place, and time       Left Foot/Ankle      Inspection and Palpation  Tenderness: bony tenderness, plantar fascia and calcaneus tenderness   Swelling: plantar fascia   Skin Exam: skin intact; Neurovascular  Dorsalis pedis: 3+  Posterior tibial: 3+  Saphenous nerve sensation: normal  Tibial nerve sensation: normal  Superficial peroneal nerve sensation: normal  Deep peroneal nerve sensation: normal  Sural nerve sensation: normal  Achilles reflex: 2+  Babinski reflex: 2+    Muscle Strength  Ankle dorsiflexion: 5  Ankle plantar flexion: 5  Ankle inversion: 5  Ankle eversion: 5  Great toe extension: 5  Great toe flexion: 5    Range of Motion    Normal left ankle ROM             Left Ankle Exam     Range of Motion   The patient has normal left ankle ROM. Muscle Strength   The patient has normal left ankle strength. Dorsiflexion:  5/5   Plantar flexion:  5/5             Vascular: pulses  dp  pt    Capillary Refill Time:   Hair growth  Skin:    Edema:    Neurologic:      Musculoskeletal/ Orthopedic examination:    NAIL   Web space   Derm:         Assessment and Plan: Today the patient was given an injection into the left heel LowDye with arch pad icing and stretching. Patient was referred to orthopedics for bilateral knee pain and weakness. Potential risks, complications, alternative treatments, and procedure prognosis were explained to the patient. Verbal informed consent was obtained from the patient. Chlora prep preparation was performed over plantar fascia. 1 mL of 0.5% Marcaine plain and 1cc celestone Soluspan   injected in standard medial approach plantar fascia. left heel   Patient tolerated steroid injection well. Band-Aid applied. The patient was educated on a possible steroid flare and the use of ice with frozen water bottle 10 minutes each night discussed. Continue passive and active range of motion stretches and strengthening bilateral plantar fascia and Achilles tendons. Faiza Horner was seen today for toe pain and foot pain. Diagnoses and all orders for this visit:    Chronic pain of both knees  -     Peter Ly MD, Orthopaedics, [facility]    Bilateral plantar fasciitis    Pain in left toe(s)        No follow-ups on file. Seen By:  Manny Irvin DPM      Document was created using voice recognition software. Note was reviewed, however may contain grammatical errors.

## 2022-03-22 ENCOUNTER — OFFICE VISIT (OUTPATIENT)
Dept: PRIMARY CARE CLINIC | Age: 62
End: 2022-03-22
Payer: COMMERCIAL

## 2022-03-22 VITALS
DIASTOLIC BLOOD PRESSURE: 72 MMHG | SYSTOLIC BLOOD PRESSURE: 118 MMHG | WEIGHT: 251 LBS | HEART RATE: 73 BPM | BODY MASS INDEX: 37.07 KG/M2 | TEMPERATURE: 97.1 F | OXYGEN SATURATION: 98 %

## 2022-03-22 DIAGNOSIS — E66.09 CLASS 2 OBESITY DUE TO EXCESS CALORIES WITHOUT SERIOUS COMORBIDITY WITH BODY MASS INDEX (BMI) OF 37.0 TO 37.9 IN ADULT: ICD-10-CM

## 2022-03-22 DIAGNOSIS — E53.8 VITAMIN B12 DEFICIENCY: ICD-10-CM

## 2022-03-22 DIAGNOSIS — E78.2 MIXED HYPERLIPIDEMIA: Primary | ICD-10-CM

## 2022-03-22 DIAGNOSIS — F32.A ANXIETY AND DEPRESSION: ICD-10-CM

## 2022-03-22 DIAGNOSIS — G89.29 CHRONIC PAIN OF BOTH KNEES: ICD-10-CM

## 2022-03-22 DIAGNOSIS — E55.9 VITAMIN D DEFICIENCY: ICD-10-CM

## 2022-03-22 DIAGNOSIS — R73.9 HYPERGLYCEMIA: ICD-10-CM

## 2022-03-22 DIAGNOSIS — F41.9 ANXIETY AND DEPRESSION: ICD-10-CM

## 2022-03-22 DIAGNOSIS — M72.2 PLANTAR FASCIITIS, LEFT: ICD-10-CM

## 2022-03-22 DIAGNOSIS — N42.9 PROSTATE DISORDER: ICD-10-CM

## 2022-03-22 DIAGNOSIS — R42 VERTIGO: ICD-10-CM

## 2022-03-22 DIAGNOSIS — J30.89 SEASONAL ALLERGIC RHINITIS DUE TO OTHER ALLERGIC TRIGGER: ICD-10-CM

## 2022-03-22 DIAGNOSIS — M25.562 CHRONIC PAIN OF BOTH KNEES: ICD-10-CM

## 2022-03-22 DIAGNOSIS — M25.561 CHRONIC PAIN OF BOTH KNEES: ICD-10-CM

## 2022-03-22 PROBLEM — J30.9 ALLERGIC RHINITIS DUE TO ALLERGEN: Status: ACTIVE | Noted: 2022-03-22

## 2022-03-22 PROCEDURE — 99215 OFFICE O/P EST HI 40 MIN: CPT | Performed by: FAMILY MEDICINE

## 2022-03-22 RX ORDER — DULOXETIN HYDROCHLORIDE 30 MG/1
30 CAPSULE, DELAYED RELEASE ORAL DAILY
Qty: 30 CAPSULE | Refills: 1 | Status: SHIPPED
Start: 2022-03-22 | End: 2022-05-03

## 2022-03-22 NOTE — ASSESSMENT & PLAN NOTE
Dr. Kelin Buchanan referred to Dr. Kiesha Hdez.   Counseled on use of Tylenol, topicals, heat/ice, range of motion

## 2022-03-22 NOTE — ASSESSMENT & PLAN NOTE
lifestyle modification emphasized. Defers formal intervention. Risk of obesity reviewed. Denies NEIL symptoms. Counseled on Mediterranean diet and weight watchers.

## 2022-03-22 NOTE — ASSESSMENT & PLAN NOTE
counseled, differential reviewed, thoroughly investigated and worked up by Dr. Delaney Del Real decades ago, has been stable for decades. Intolerant to meclizine because of sedation. It is only with certain position particularly lying doing overhead activities such as working on a car/under a sink.   Declines further evaluation/treatment at this time

## 2022-03-22 NOTE — PROGRESS NOTES
08/07/2020    RBC 4.60 02/11/2022    RBC 4.58 08/10/2021    RBC 4.79 08/07/2020    HGB 13.6 02/11/2022    HGB 13.6 08/10/2021    HGB 14.0 08/07/2020    HCT 42.4 02/11/2022    HCT 42.4 08/10/2021    HCT 43.7 08/07/2020    MCV 92.2 02/11/2022    MCV 92.6 08/10/2021    MCV 91.2 08/07/2020     02/11/2022     08/10/2021     08/07/2020      CMP  Lab Results   Component Value Date     03/15/2022     02/11/2022     07/08/2021    K 4.6 03/15/2022    K 4.8 02/11/2022    K 4.2 07/08/2021     03/15/2022     02/11/2022     07/08/2021    CO2 24 03/15/2022    CO2 22 02/11/2022    CO2 25 07/08/2021    ANIONGAP 10 03/15/2022    ANIONGAP 16 02/11/2022    ANIONGAP 11 07/08/2021    GLUCOSE 105 03/15/2022    GLUCOSE 117 02/11/2022    GLUCOSE 117 07/08/2021    BUN 25 03/15/2022    BUN 27 02/11/2022    BUN 22 07/08/2021    CREATININE 0.9 03/15/2022    CREATININE 1.0 02/11/2022    CREATININE 1.0 07/08/2021    CREATININE 1.0 01/17/2019    LABGLOM >60 03/15/2022    LABGLOM >60 02/11/2022    LABGLOM >60 07/08/2021    GFRAA >60 03/15/2022    GFRAA >60 02/11/2022    GFRAA >60 07/08/2021    CALCIUM 9.0 03/15/2022    CALCIUM 9.4 02/11/2022    CALCIUM 9.4 07/08/2021    PROT 6.6 03/15/2022    PROT 6.8 02/11/2022    PROT 7.0 07/08/2021    LABALBU 3.9 03/15/2022    LABALBU 4.2 02/11/2022    LABALBU 4.2 07/08/2021    BILITOT 0.3 03/15/2022    BILITOT 0.5 02/11/2022    BILITOT 0.5 07/08/2021    ALKPHOS 56 03/15/2022    ALKPHOS 53 02/11/2022    ALKPHOS 63 07/08/2021    AST 18 03/15/2022    AST 20 02/11/2022    AST 20 07/08/2021    ALT 16 03/15/2022    ALT 19 02/11/2022    ALT 18 07/08/2021     A1C  Lab Results   Component Value Date    LABA1C 5.9 02/11/2022    LABA1C 5.7 07/08/2021    LABA1C 6.1 08/07/2020     TSH  Lab Results   Component Value Date    TSH 1.340 02/11/2022    TSH 1.280 08/10/2021    TSH 1.660 08/07/2020     FREET4  No results found for: [id number]  LIPID  Lab Results   Component Value Date    CHOL 168 03/15/2022    CHOL 183 02/11/2022    CHOL 169 08/10/2021    HDL 38 03/15/2022    HDL 43 02/11/2022    HDL 45 08/10/2021    LDLCALC 116 03/15/2022    LDLCALC 120 02/11/2022    LDLCALC 110 08/10/2021    TRIG 71 03/15/2022    TRIG 102 02/11/2022    TRIG 68 08/10/2021    CHOLHDLRATIO 4.4 01/17/2019     VITAMIN D  Lab Results   Component Value Date    VITD25 22 02/11/2022    VITD25 29 08/10/2021    VITD25 27 08/07/2020     MAGNESIUM  No results found for: MG   PHOS  No results found for: PHOS   ASHU   No results found for: ASHU  RHEUMATOID FACTOR  No results found for: RF  PSA  Lab Results   Component Value Date    PSA 2.97 02/11/2022    PSA 4.62 08/10/2021    PSA 3.10 05/15/2020      HEPATITIS C  Lab Results   Component Value Date    HCVABI Non-Reactive 03/15/2022     HIV  No results found for: IYQ5SJC, HIV1QT  UA  Lab Results   Component Value Date    COLORU Yellow 02/11/2022    COLORU Yellow 08/10/2021    COLORU Yellow 08/07/2020    CLARITYU Clear 02/11/2022    CLARITYU Clear 08/10/2021    CLARITYU Clear 08/07/2020    GLUCOSEU Negative 02/11/2022    GLUCOSEU Negative 08/10/2021    GLUCOSEU Negative 08/07/2020    BILIRUBINUR Negative 02/11/2022    BILIRUBINUR Negative 08/10/2021    BILIRUBINUR Negative 08/07/2020    KETUA Negative 02/11/2022    KETUA Negative 08/10/2021    KETUA Negative 08/07/2020    SPECGRAV 1.020 02/11/2022    SPECGRAV 1.020 08/10/2021    SPECGRAV 1.020 08/07/2020    BLOODU Negative 02/11/2022    BLOODU Negative 08/10/2021    BLOODU Negative 08/07/2020    PHUR 6.5 02/11/2022    PHUR 6.5 08/10/2021    PHUR 6.0 08/07/2020    PROTEINU Negative 02/11/2022    PROTEINU Negative 08/10/2021    PROTEINU Negative 08/07/2020    UROBILINOGEN 0.2 02/11/2022    UROBILINOGEN 1.0 08/10/2021    UROBILINOGEN 0.2 08/07/2020    NITRU Negative 02/11/2022    NITRU Negative 08/10/2021    NITRU Negative 08/07/2020    LEUKOCYTESUR TRACE 02/11/2022    LEUKOCYTESUR Negative 08/10/2021    LEUKOCYTESUR Negative 08/07/2020     Urine Micro/Albumin Ratio  No results found for: MALBCR      Review of Systems  ROS:  Const: Denies chills, fever, malaise and sweats. Eyes: Denies discharge, pain, redness and visual disturbance. ENMT: Denies earaches, other ear symptoms. Denies nasal or sinus symptoms other than stated  above. Denies mouth and tongue lesions and sore throat. CV: Denies chest discomfort, pain; diaphoresis, dizziness, edema, lightheadedness, orthopnea,  palpitations, syncope and near syncopal episode or any exertional symptoms  Resp: Denies cough, hemoptysis, pleuritic pain, SOB, sputum production and wheezing. GI: Denies abdominal pain, change in bowel habits, hematochezia, melena, nausea and vomiting. : Denies urinary symptoms including dysuria , urgency, frequency or hematuria. Musculo: Chronic knee pain and left plantar fasciitis  Skin: Denies bruising and rash.   Neuro: Denies headache, numbness, stiff neck, tingling and focal weakness slurred speech or facial  droop  Hema/Lymph: Denies bleeding/bruising tendency and enlarged lymph nodes        Current Outpatient Medications:     DULoxetine (CYMBALTA) 30 MG extended release capsule, Take 1 capsule by mouth daily, Disp: 30 capsule, Rfl: 1    rosuvastatin (CRESTOR) 10 MG tablet, Take 1 tablet by mouth Daily with supper, Disp: 30 tablet, Rfl: 1    DULoxetine (CYMBALTA) 60 MG extended release capsule, Take 1 capsule by mouth daily, Disp: 30 capsule, Rfl: 12    omeprazole (PRILOSEC) 20 MG delayed release capsule, Take 1 capsule by mouth daily, Disp: 30 capsule, Rfl: 12    tadalafil (CIALIS) 5 MG tablet, Take 5 mg by mouth daily, Disp: , Rfl:      Allergies   Allergen Reactions    Prednisone Anxiety       Past Medical History:   Diagnosis Date    Disorder of prostate     Dysthymic disorder     GERD (gastroesophageal reflux disease)     Migraine     Tinea unguium     Vitamin D deficiency      Past Surgical History:   Procedure Laterality Date  CHOLECYSTECTOMY, LAPAROSCOPIC      HERNIA REPAIR       No family history on file. Social History     Tobacco Use    Smoking status: Never Smoker    Smokeless tobacco: Never Used   Vaping Use    Vaping Use: Never used   Substance Use Topics    Alcohol use: No    Drug use: No      Social History     Social History Narrative    PMH:    Health Maintenance:    Influenza Vaccination - (2017)    Dr Angelo Bose - nl exam     Medical Problems:    Ulcers - Ena Thakur. Has had EGD  (3 ulcers, erosion of stomach). repeat . repeat     Last colonoscopy , ,; nl. had 2010, nl. GERD    migraines - follows with Dr Laurel Smith; typically around allergy season. started age 25. cervical DDD    overactive bladder - Dr Ileana Lux spur - Dr Song Mccormick; surgery    [facility]chicken pox    [facility]    Surgical Hx:    Gl[place]    Hernia repair - Rt ; Dr Benito Mcgarry    Vasectomy, T & A    Reviewed, no changes. FH:    Father:    Non Contributory. Mother:    Non Contributory. Brother 1 yr younger chron's    Dad - CHF  71 heavy smoker    Mom - healthy    Reviewed, no changes. SH:    Marital:  - x2, getting . limited caffeine. own [place]    Personal Habits: Cigarette Use: Does Not Smoke - occassional cigar. Alcohol: Drinks Alcoholic    Beverages Rarely. Vitals:    22 0925   BP: 118/72   Site: Right Upper Arm   Position: Sitting   Pulse: 73   Temp: 97.1 °F (36.2 °C)   TempSrc: Temporal   SpO2: 98%   Weight: 251 lb (113.9 kg)       Physical Exam  Exam:  Const: Appears comfortable. No signs of acute distress present. Head/Face: Atraumatic on inspection. Eyes: EOMI in both eyes. No discharge from the eyes. PERRL. Sclerae clear. ENMT: Auditory canals normal. Tympanic membranes: intact and translucent. External nose WNL. Nasal mucosa is clear. Oropharynx: No erythema or exudate. Posterior pharynx is normal.  Neck: Supple. Palpation reveals no adenopathy.  No masses appreciated. No JVD. Carotids: no  bruits. Resp: Respirations are unlabored. Clear to auscultation. No rales, rhonchi or wheezes appreciated  over the lungs bilaterally. CV: Rate is regular. Rhythm is regular. No gallop or rubs. No heart murmur appreciated. Extremities: No clubbing, cyanosis, or edema. No calf inflammation or tenderness. Abdomen: Bowel sounds are normoactive. Abdomen is soft, nontender, and nondistended. No  abdominal masses. No palpable hepatosplenomegaly. Genitalia: defers to urology  Rectal: defers urology  Lymph: No palpable or visible regional lymphadenopathy. Musculoskeletal: no acute joint inflammation. ,  Bilateral knee crepitus counseled,    Skin: Dry and warm with no rash. Skin normal to inspection and palpation overall. Neuro: Alert and oriented. Affect: appropriate. Upper Extremities: 5/5 bilaterally. Lower Extremities:  5/5 bilaterally. Sensation intact to light touch. Reflexes: DTR's are symmetric and 2+ bilaterally. .  Cranial Nerves: Cranial nerves grossly intact. Genitalia/rectal - defers to Dr Janina Moore (Saw 5/20 and had PSA)    Assessment and Plan:   Diagnosis Orders   1. Mixed hyperlipidemia  Comprehensive Metabolic Panel    CK    Lipid Panel   2. Hyperglycemia     3. Prostate disorder     4. Vitamin D deficiency  Vitamin D 25 Hydroxy   5. Class 2 obesity due to excess calories without serious comorbidity with body mass index (BMI) of 37.0 to 37.9 in adult     6. Anxiety and depression  DULoxetine (CYMBALTA) 30 MG extended release capsule   7. Vitamin B12 deficiency  Vitamin B12 & Folate   8. Plantar fasciitis, left     9. Chronic pain of both knees     10. Seasonal allergic rhinitis due to other allergic trigger     11. Vertigo            Class 2 obesity due to excess calories without serious comorbidity with body mass index (BMI) of 37.0 to 37.9 in adult    lifestyle modification emphasized. Defers formal intervention. Risk of obesity reviewed. Denies NEIL symptoms. Counseled on Mediterranean diet and weight watchers. Vitamin B12 deficiency    history of, recommend B12 sublingual 1000 mcg daily, check levels in 3 months, consider injection    Plantar fasciitis, left    counseled, continue per Dr. Daniella Piedra, recent injection    Chronic pain of both knees    Dr. Daniella Piedra referred to Dr. Cornel Díaz. Counseled on use of Tylenol, topicals, heat/ice, range of motion    Allergic rhinitis due to allergen    stable with injections    Vertigo    counseled, differential reviewed, thoroughly investigated and worked up by Dr. Mj Mckeon decades ago, has been stable for decades. Intolerant to meclizine because of sedation. It is only with certain position particularly lying doing overhead activities such as working on a car/under a sink. Declines further evaluation/treatment at this time     Health maintenance examination  Health maintenance issues discussed at length  8/16/2021. Encouraged yearly physicals. Metabolic syndrome  Counseled extensively. Lifestyle modification reviewed. Importance of diet exercise and weight loss reviewed. Micro-macrovascular complications reviewed. Stable. He was intolerant to Metformin XR and we will monitor at this time, defers other pharmacotherapy at this time. Monitor. Hyperglycemia  Counseled. See comments under metabolic syndrome. Gastroesophageal reflux disease without esophagitis  Patient completely asymptomatic, followed with Dr. villa. Omeprazole 20 mg daily, previously 40 mg with risks benefits reviewed including masking underlying etiologies, risk of calcium else works and renal insufficiency, C. diff and otherwise. Had endoscopy around 2018 appropriate timing of dosing of PPI reviewed. Also notices benefit on lactose-free. Discussed if symptom-free he may wean and stop this notify us if any recurrence.   He has noticed symptoms without medicine, and if he knows he is going to eat poorly he will take an extra omeprazole with good control     Dysthymia  Stable and asymptomatic on medication. Tolerating well. Risk benefits and ADRs reviewed. Appropriate counseling reviewed. Adamantly denies SI/HI. Appropriate timing of dosing of Cymbalta reviewed. However he would like to wean and stop if able. Therefore we will reduce to 30 mg daily and he will follow-up in 4 weeks. If doing better consider discontinuing, if worse at any point go back to 60 mg. Talked about lowest effective dose    Disorder of prostate  ASX. Following with Dr. Duy Alejandro,, had MRI that was negative he states, follow-up    Vitamin D deficiency  Appropriate supplementation reviewed, encourage vitamin D3 2000 IUs daily. Mixed hyperlipidemia  Elevated ASCVD. Counseled. Does not think he tolerated Crestor but not sure. Wants to hold off now, we could reconsider in 3 months versus other statin. Preventative properties reviewed. Lifestyle modification reviewed. Risk of hyperlipidemia reviewed. Counseled on co-Q10/vitamin D. Recheck 3 months              Plan as above. Counseled extensively and differential diagnoses relevant to above were reviewed, including serious etiologies. Side effects and interactions of medications were reviewed. Counseled extensively. Hold on Crestor, revisit 3 months. Reduce Cymbalta to lowest effective dose. Start sublingual B12. Continue per specialist.  He is going to follow-up and prefers virtual in 1 month to reassess effects of Cymbalta and we can continue to wean or titrate back up as necessary. Plan blood work again in 3 months revisit statin then. Precautions reviewed. As long as symptoms steadily improve/resolve, and medical conditions follow the expected course, FU as below, sooner PRN. Return in about 4 weeks (around 4/19/2022) for virtual.        Signs and symptoms to watch for discussed, serious signs and symptoms reviewed. ER if any.      Over 40 minutes  spent with the patient in reviewing records, reviewing with patient/family, counseling, ordering,  prescribing, completing h&p, etc., with over 50% of the time spent face to face counseling. (Chart Wizard used)    Divya Diez MD    Patients are advised to check with insurance company to ensure coverage and to fully understand benefits and cost prior to any testing. This note was created with the assistance of voice recognition software. Document was reviewed however may contain grammatical errors.

## 2022-04-01 ENCOUNTER — TELEPHONE (OUTPATIENT)
Dept: ORTHOPEDIC SURGERY | Age: 62
End: 2022-04-01

## 2022-04-01 NOTE — TELEPHONE ENCOUNTER
Call placed to patient in an attempt to schedule a referral we received. No answer, VM left and appointment tentatively made at this time.     Future Appointments   Date Time Provider Sarah Stearns   4/18/2022  9:30 AM MD ASHER Farmer [facility]   5/3/2022  9:00 AM Alejo Davis MD [facility]   5/23/2022  7:30 AM Radha Osborne DPM [facility]

## 2022-05-02 DIAGNOSIS — R52 PAIN: Primary | ICD-10-CM

## 2022-05-03 ENCOUNTER — OFFICE VISIT (OUTPATIENT)
Dept: ORTHOPEDIC SURGERY | Age: 62
End: 2022-05-03
Payer: COMMERCIAL

## 2022-05-03 VITALS — BODY MASS INDEX: 37.03 KG/M2 | HEIGHT: 69 IN | WEIGHT: 250 LBS

## 2022-05-03 DIAGNOSIS — M17.0 PRIMARY OSTEOARTHRITIS OF BOTH KNEES: Primary | ICD-10-CM

## 2022-05-03 PROCEDURE — 99203 OFFICE O/P NEW LOW 30 MIN: CPT | Performed by: ORTHOPAEDIC SURGERY

## 2022-05-03 PROCEDURE — 20610 DRAIN/INJ JOINT/BURSA W/O US: CPT | Performed by: ORTHOPAEDIC SURGERY

## 2022-05-03 RX ORDER — TRIAMCINOLONE ACETONIDE 40 MG/ML
40 INJECTION, SUSPENSION INTRA-ARTICULAR; INTRAMUSCULAR ONCE
Status: COMPLETED | OUTPATIENT
Start: 2022-05-03 | End: 2022-05-03

## 2022-05-03 RX ORDER — BUPIVACAINE HYDROCHLORIDE 2.5 MG/ML
6 INJECTION, SOLUTION EPIDURAL; INFILTRATION; INTRACAUDAL ONCE
Status: COMPLETED | OUTPATIENT
Start: 2022-05-03 | End: 2022-05-03

## 2022-05-03 RX ORDER — LIDOCAINE HYDROCHLORIDE 10 MG/ML
6 INJECTION, SOLUTION INFILTRATION; PERINEURAL ONCE
Status: COMPLETED | OUTPATIENT
Start: 2022-05-03 | End: 2022-05-03

## 2022-05-03 RX ADMIN — LIDOCAINE HYDROCHLORIDE 6 ML: 10 INJECTION, SOLUTION INFILTRATION; PERINEURAL at 10:24

## 2022-05-03 RX ADMIN — TRIAMCINOLONE ACETONIDE 40 MG: 40 INJECTION, SUSPENSION INTRA-ARTICULAR; INTRAMUSCULAR at 10:25

## 2022-05-03 RX ADMIN — TRIAMCINOLONE ACETONIDE 40 MG: 40 INJECTION, SUSPENSION INTRA-ARTICULAR; INTRAMUSCULAR at 10:24

## 2022-05-03 RX ADMIN — BUPIVACAINE HYDROCHLORIDE 15 MG: 2.5 INJECTION, SOLUTION EPIDURAL; INFILTRATION; INTRACAUDAL at 10:23

## 2022-05-03 NOTE — PROGRESS NOTES
21  Chief Complaint   Patient presents with    Knee Pain     bilateral       SUBJECTIVE: Patient is a very pleasant 26-year-old male comes in today with a chief complaint of bilateral knee pain. He said the left is little bit worse than the right. He been suffering from this for about 2 years time. He is tried ibuprofen. He recently about a month or so ago got injection of his left foot for plantar fasciitis which she said helped his knee pain but has worn off since. He denies any significant injuries. He is active and does walk on a treadmill weekly. He denies any other injuries or falls. Past Medical History:   Diagnosis Date    Disorder of prostate     Dysthymic disorder     GERD (gastroesophageal reflux disease)     Migraine     Tinea unguium     Vitamin D deficiency      Past Surgical History:   Procedure Laterality Date    CHOLECYSTECTOMY, LAPAROSCOPIC      HERNIA REPAIR       History reviewed. No pertinent family history. Social History     Tobacco Use    Smoking status: Never Smoker    Smokeless tobacco: Never Used   Vaping Use    Vaping Use: Never used   Substance Use Topics    Alcohol use: No    Drug use: No     Allergies   Allergen Reactions    Prednisone Anxiety         Review of Systems   Constitutional: Negative for fever, chills, diaphoresis, appetite change and fatigue. HENT: Negative for dental issues, hearing loss and tinnitus. Negative for congestion, sinus pressure, sneezing, sore throat. Negative for headache. Eyes: Negative for visual disturbance, blurred and double vision. Negative for pain, discharge, redness and itching  Respiratory: Negative for cough, shortness of breath and wheezing. Cardiovascular: Negative for chest pain, palpitations and leg swelling. No dyspnea on exertion   Gastrointestinal:   Negative for nausea, vomiting, abdominal pain, diarrhea, constipation  or black or bloody.   Hematologic\Lymphatic:  negative for bleeding, petechiae, Genitourinary: Negative for hematuria and difficulty urinating. Musculoskeletal: Negative for neck pain and stiffness. Negative for back pain, see HPI  Skin: Negative for pallor, rash and wound. Neurological: Negative for dizziness, tremors, seizures, weakness, light-headedness, no TIA or stroke symptoms. No numbness and headaches. Psychiatric/Behavioral: Negative. OBJECTIVE:      Physical Examination:   General appearance: alert, well appearing, and in no distress,  normal appearing weight. No visible signs of trauma   Mental status: alert, oriented to person, place, and time, normal mood, behavior, speech, dress, motor activity, and thought processes  Abdomen: soft, nondistended  Resp:   resp easy and unlabored, no audible wheezes note, normal symmetrical expansion of both hemithoraces  Cardiac: distal pulses palpable, skin and extremities well perfused  Neurological: alert, oriented X3, normal speech, no focal findings or movement disorder noted, motor and sensory grossly normal bilaterally, normal muscle tone, no tremors, strength 5/5, normal gait and station  HEENT: normochephalic atraumatic, external ears and eyes normal, sclera normal, neck supple, no nasal discharge. Extremities:   peripheral pulses normal, no edema, redness or tenderness in the calves   Skin: normal coloration, no rashes or open wounds, no suspicious skin lesions noted  Psych: Affect euthymic   Musculoskeletal:   Extremity:  Bilateral knees   Skin intact. No effusion noted bilaterally. No joint line tenderness to palpation bilaterally. Stable to varus and valgus at 30 degrees of flexion bilaterally. Negative Lachman's and posterior drawer bilaterally. Compartments soft and compressible. NVID. 2/4 DP and PT pulses. Mild crepitus on range of motion with good patellar tracking bilaterally. Calves soft and NT.     5/5 EHL, TA, GS.      Range of motion is -3 to 105 degrees bilaterally      Ht 5' 9\" (1.753 m)   Wt 250 lb (113.4 kg)   BMI 36.92 kg/m²      XR: 5/3/22   X-ray today bilateral weightbearing views of the knees shows moderate arthritis bilaterally tricompartmental.  A bit more pronounced in the patellofemoral compartments. No obvious fractures or dislocations. ASSESSMENT:     Diagnosis Orders   1. Primary osteoarthritis of both knees          Discussion: Talk to the patient detail about conservative therapy for osteoarthritis. Talked about oral NSAIDs, physical therapy, possible injection. Talked about the risk of an infection and possible cartilage degradation with injection he agreed go forward with injection. Procedure Note  Skin prepped with alcohol.  21ga. Needle used to inject 3cc 1% Lidocaine, 3cc 0.25% Marcaine, and 1cc Kenalog into the right knee through anterior medial portal.  Patient tolerated well and band aid applied. Walked out of the office with no issues. This process was repeated on the left knee with identical injection no complications.       PLAN:  Injected bilateral knees today    NSAIDs    Follow-up in 3 months    Call with any questions concerns or issues with injection sites        ELECTRONICALLY signed by:    Nic Gastelum MD  5/3/22

## 2022-05-03 NOTE — PATIENT INSTRUCTIONS
Call with any issues or problems with your injection sites    Follow-up in 3 months, can cancel if he is still feeling well    NSAID sent to pharmacy, take at your discretion    Please call with any questions or concerns

## 2022-05-03 NOTE — PROGRESS NOTES
Patient here as a new patient for bilateral knee pain today. Patient denies any previous surgery or trauma to both knees. Patient states that the knee pain has been ongoing for a couple years now. He states that he has trouble getting down to the ground, kneeling, and going up steps his knee wants to give out.              Electronically signed by Yo Evans MA on 5/3/2022 at 9:16 AM

## 2022-05-23 ENCOUNTER — PROCEDURE VISIT (OUTPATIENT)
Dept: PODIATRY | Age: 62
End: 2022-05-23
Payer: COMMERCIAL

## 2022-05-23 VITALS
BODY MASS INDEX: 36.92 KG/M2 | WEIGHT: 250 LBS | DIASTOLIC BLOOD PRESSURE: 79 MMHG | SYSTOLIC BLOOD PRESSURE: 133 MMHG | TEMPERATURE: 97.9 F

## 2022-05-23 DIAGNOSIS — G25.81 RESTLESS LEG SYNDROME: Primary | ICD-10-CM

## 2022-05-23 PROCEDURE — 99213 OFFICE O/P EST LOW 20 MIN: CPT | Performed by: PODIATRIST

## 2022-05-23 RX ORDER — ROPINIROLE 0.25 MG/1
0.25 TABLET, FILM COATED ORAL NIGHTLY
Qty: 90 TABLET | Refills: 3 | Status: SHIPPED | OUTPATIENT
Start: 2022-05-23

## 2022-05-23 NOTE — PROGRESS NOTES
22  Peyton Houston : 1960 Sex: male  Age: 59 y.o. Patient was referred by Ladene Klinefelter, MD    Chief Complaint   Patient presents with    Toe Pain     saw pcp Dr. Jamshid Amato 5/3/22       SUBJECTIVE patient is seen today for follow-up of fungus toenails as well as a new issue on cramping bilateral legs mostly at night. HPI  Review of Systems  Const: Denies constitutional symptoms  Musculo: Denies symptoms other than stated above  Skin: Denies symptoms other than stated above       Current Outpatient Medications:     rOPINIRole (REQUIP) 0.25 MG tablet, Take 1 tablet by mouth nightly, Disp: 90 tablet, Rfl: 3    diclofenac (VOLTAREN) 50 MG EC tablet, Take 1 tablet by mouth 2 times daily (with meals), Disp: 60 tablet, Rfl: 1    DULoxetine (CYMBALTA) 60 MG extended release capsule, Take 1 capsule by mouth daily, Disp: 30 capsule, Rfl: 12    omeprazole (PRILOSEC) 20 MG delayed release capsule, Take 1 capsule by mouth daily, Disp: 30 capsule, Rfl: 12    tadalafil (CIALIS) 5 MG tablet, Take 5 mg by mouth daily, Disp: , Rfl:   Allergies   Allergen Reactions    Prednisone Anxiety       Past Medical History:   Diagnosis Date    Disorder of prostate     Dysthymic disorder     GERD (gastroesophageal reflux disease)     Migraine     Tinea unguium     Vitamin D deficiency      Past Surgical History:   Procedure Laterality Date    CHOLECYSTECTOMY, LAPAROSCOPIC      HERNIA REPAIR       No family history on file.   Social History     Socioeconomic History    Marital status:      Spouse name: Not on file    Number of children: Not on file    Years of education: Not on file    Highest education level: Not on file   Occupational History    Not on file   Tobacco Use    Smoking status: Never Smoker    Smokeless tobacco: Never Used   Vaping Use    Vaping Use: Never used   Substance and Sexual Activity    Alcohol use: No    Drug use: No    Sexual activity: Not on file   Other Topics Concern    Not on file   Social History Narrative    PMH:    Health Maintenance:    Influenza Vaccination - (2017)    Dr Micah Laguna - nl exam     Medical Problems:    Ulcers - Yosseff. Has had EGD  (3 ulcers, erosion of stomach). repeat . repeat     Last colonoscopy , ,; nl. had , nl. GERD    migraines - follows with Dr Quan Strange; typically around allergy season. started age 25. cervical DDD    overactive bladder - Dr Caty Worley spur - Dr Rivas Amaya; surgery    Had measles/chicken pox    [facility]    Surgical Hx:    Glenny    Hernia repair - Rt ; Dr Johnston Corpus    Vasectomy, T & A    Reviewed, no changes. FH:    Father:    Non Contributory. Mother:    Non Contributory. Brother 1 yr younger chron's    Dad - CHF  71 heavy smoker    Mom - healthy    Reviewed, no changes. SH:    Marital:  - x2, getting . limited caffeine. own [place]    Personal Habits: Cigarette Use: Does Not Smoke - occassional cigar. Alcohol: Drinks Alcoholic    Beverages Rarely. Social Determinants of Health     Financial Resource Strain: Unknown    Difficulty of Paying Living Expenses: Patient refused   Food Insecurity: Unknown    Worried About Running Out of Food in the Last Year: Patient refused    [address] in the Last Year: Patient refused   Transportation Needs:     Lack of Transportation (Medical): Not on file    Lack of Transportation (Non-Medical):  Not on file   Physical Activity:     Days of Exercise per Week: Not on file    Minutes of Exercise per Session: Not on file   Stress:     Feeling of Stress : Not on file   Social Connections:     Frequency of Communication with Friends and Family: Not on file    Frequency of Social Gatherings with Friends and Family: Not on file    Attends Shinto Services: Not on file    Active Member of Clubs or Organizations: Not on file    Attends Club or Organization Meetings: Not on file    Marital Status: Not on file   Intimate Partner Violence:     Fear of Current or Ex-Partner: Not on file    Emotionally Abused: Not on file    Physically Abused: Not on file    Sexually Abused: Not on file   Housing Stability:     Unable to Pay for Housing in the Last Year: Not on file    Number of [place] in the Last Year: Not on file    Unstable Housing in the Last Year: Not on file       Vitals:    05/23/22 0720   BP: 133/79   Temp: 97.9 °F (36.6 °C)   TempSrc: Temporal   Weight: 250 lb (113.4 kg)       Focused Lower Extremity Physical Exam:  Vitals:    05/23/22 0720   BP: 133/79   Temp: 97.9 °F (36.6 °C)        Foot Exam    General  General Appearance: appears stated age and healthy   Orientation: alert and oriented to person, place, and time       Right Foot/Ankle     Inspection and Palpation  Skin Exam: skin intact; Neurovascular  Saphenous nerve sensation: normal  Tibial nerve sensation: normal  Superficial peroneal nerve sensation: normal  Deep peroneal nerve sensation: normal  Sural nerve sensation: normal  Achilles reflex: 2+  Babinski reflex: 2+    Muscle Strength  Ankle dorsiflexion: 5  Ankle plantar flexion: 5  Ankle inversion: 5  Ankle eversion: 5  Great toe extension: 5  Great toe flexion: 5    Range of Motion    Normal right ankle ROM      Left Foot/Ankle      Inspection and Palpation  Skin Exam: skin intact; Neurovascular  Saphenous nerve sensation: normal  Tibial nerve sensation: normal  Superficial peroneal nerve sensation: normal  Deep peroneal nerve sensation: normal  Sural nerve sensation: normal  Achilles reflex: 2+  Babinski reflex: 2+    Muscle Strength  Ankle dorsiflexion: 5  Ankle plantar flexion: 5  Ankle inversion: 5  Ankle eversion: 5  Great toe extension: 5  Great toe flexion: 5    Range of Motion    Normal left ankle ROM             Right Ankle Exam     Range of Motion   The patient has normal right ankle ROM.     Muscle Strength   Dorsiflexion:  5/5  Plantar flexion:  5/5      Left Ankle Exam     Range of Motion   The patient has normal left ankle ROM. Muscle Strength   The patient has normal left ankle strength. Dorsiflexion:  5/5   Plantar flexion:  5/5             Vascular: pulses  dp  pt    Capillary Refill Time:   Hair growth  Skin:    Edema:    Neurologic:      Musculoskeletal/ Orthopedic examination:    NAIL  Web space   Derm:          Assessment and Plan: Patient was given Requip 1 pill at bedtime. Noy Mota was seen today for toe pain. Diagnoses and all orders for this visit:    Restless leg syndrome    Other orders  -     rOPINIRole (REQUIP) 0.25 MG tablet; Take 1 tablet by mouth nightly        No follow-ups on file. Seen By:  Salvador Huffman DPM      Document was created using voice recognition software. Note was reviewed, however may contain grammatical errors.

## 2022-07-08 ENCOUNTER — TELEMEDICINE (OUTPATIENT)
Dept: PRIMARY CARE CLINIC | Age: 62
End: 2022-07-08
Payer: COMMERCIAL

## 2022-07-08 DIAGNOSIS — F41.9 ANXIETY AND DEPRESSION: ICD-10-CM

## 2022-07-08 DIAGNOSIS — F32.A ANXIETY AND DEPRESSION: ICD-10-CM

## 2022-07-08 DIAGNOSIS — U07.1 COVID-19: Primary | ICD-10-CM

## 2022-07-08 DIAGNOSIS — E78.2 MIXED HYPERLIPIDEMIA: ICD-10-CM

## 2022-07-08 PROCEDURE — 99214 OFFICE O/P EST MOD 30 MIN: CPT | Performed by: FAMILY MEDICINE

## 2022-07-08 RX ORDER — PREDNISONE 10 MG/1
TABLET ORAL
Qty: 12 TABLET | Refills: 0 | Status: SHIPPED | OUTPATIENT
Start: 2022-07-08 | End: 2022-07-14

## 2022-07-08 RX ORDER — AMOXICILLIN AND CLAVULANATE POTASSIUM 875; 125 MG/1; MG/1
1 TABLET, FILM COATED ORAL 2 TIMES DAILY
Qty: 20 TABLET | Refills: 0 | Status: SHIPPED | OUTPATIENT
Start: 2022-07-08 | End: 2022-07-18

## 2022-07-08 NOTE — PROGRESS NOTES
TeleMedicine Patient Consent    This visit was performed as a virtual video visit using a synchronous, two-way, audio-video telehealth technology platform. Patient identification was verified at the start of the visit, including the patient's telephone number and physical location. I discussed with the patient the nature of our telehealth visits, that:     1. Due to the nature of an audio- video modality, the only components of a physical exam that could be done are the elements supported by direct observation. 2. I would evaluate the patient and recommend diagnostics and treatments based on my assessment. 3. If it was felt that the patient should be evaluated in clinic or an emergency room setting, then they would be directed there. 4. Our sessions are not being recorded and that personal health information is protected. 5. Our team would provide follow up care in person if/when the patient needs it. Patient does agree to proceed with telemedicine consultation. Patient's location: home address in [facility]    Physician  location other address in [place]     Other people involved in call:   None    This visit was completed virtually using Doxy. me    2022    TELEHEALTH EVALUATION -- Audio/Visual (During YFCOD- public health emergency)    Chief Complaint   Patient presents with    Other     tested positive on 22    Congestion    Headache    Drainage           HPI:    Francisco Su (:  1960) has requested an audio/video evaluation for the following concern(s):    Sx onset . Tested Pos Covid Wed. T-max 99, body aches sinus pressure thick rhinorrhea postnasal drainage cough dry paroxysmal times no chest pain shortness of breath wheeze abdominal pain nausea vomiting change in bowels. Did not get the follow-up blood work.   He does now have some interest in considering Crestor as his friend who he states was 350 pounds, sleep apnea, on blood thinners had a intracranial hemorrhage making love.  We did . He is appropriately handling. They are optimistic on his recovery. ROS:  Const: Denies chills, fever, malaise and sweats. Eyes: Denies discharge, pain, redness and visual disturbance. ENMT: As above, no throat swelling or trismus  CV: Denies chest discomfort, pain; diaphoresis, dizziness, edema, lightheadedness, orthopnea,  palpitations, syncope and near syncopal episode or any exertional symptoms  Resp: Denies  hemoptysis, pleuritic pain, SOB, sputum production and wheezing. GI: Denies abdominal pain, change in bowel habits, hematochezia, melena, nausea and vomiting. : Denies urinary symptoms including dysuria , urgency, frequency or hematuria. Musculo: Denies musculoskeletal symptoms. Skin: Denies bruising and rash. Neuro: Denies headache, numbness, stiff neck, tingling and focal weakness slurred speech or facial  droop  Hema/Lymph: Denies bleeding/bruising tendency and enlarged lymph nodes         Current Outpatient Medications:     amoxicillin-clavulanate (AUGMENTIN) 875-125 MG per tablet, Take 1 tablet by mouth 2 times daily for 10 days, Disp: 20 tablet, Rfl: 0    predniSONE (DELTASONE) 10 MG tablet, Take 3 tablets by mouth daily for 2 days, THEN 2 tablets daily for 2 days, THEN 1 tablet daily for 2 days. , Disp: 12 tablet, Rfl: 0    rOPINIRole (REQUIP) 0.25 MG tablet, Take 1 tablet by mouth nightly, Disp: 90 tablet, Rfl: 3    diclofenac (VOLTAREN) 50 MG EC tablet, Take 1 tablet by mouth 2 times daily (with meals), Disp: 60 tablet, Rfl: 1    DULoxetine (CYMBALTA) 60 MG extended release capsule, Take 1 capsule by mouth daily, Disp: 30 capsule, Rfl: 12    omeprazole (PRILOSEC) 20 MG delayed release capsule, Take 1 capsule by mouth daily, Disp: 30 capsule, Rfl: 12    tadalafil (CIALIS) 5 MG tablet, Take 5 mg by mouth daily, Disp: , Rfl:   Allergies   Allergen Reactions    Prednisone Anxiety       Past Medical History:   Diagnosis Date    Disorder of prostate     Dysthymic disorder     GERD (gastroesophageal reflux disease)     Migraine     Tinea unguium     Vitamin D deficiency      Past Surgical History:   Procedure Laterality Date    CHOLECYSTECTOMY, LAPAROSCOPIC      HERNIA REPAIR       No family history on file. Social History     Tobacco Use    Smoking status: Never Smoker    Smokeless tobacco: Never Used   Vaping Use    Vaping Use: Never used   Substance Use Topics    Alcohol use: No    Drug use: No     Social History     Social History Narrative    PMH:    Health Maintenance:    Influenza Vaccination - (2017)    Dr Manoj Omer - nl exam     Medical Problems:    Ulcers - Joseline Almanza. Has had EGD  (3 ulcers, erosion of stomach). repeat . repeat     Last colonoscopy , ,; nl. had , nl. GERD    migraines - follows with Dr Carole Martinez; typically around allergy season. started age 25. cervical DDD    overactive bladder - Dr Sarahi Saoresils spur - Dr Mor Guerrero; surgery    Had measles/chicken pox    [facility]    Surgical Hx:    Glenny    Hernia repair - Rt ; Dr Tamica Bowers    Vasectomy, T & A    Reviewed, no changes. FH:    Father:    Non Contributory. Mother:    Non Contributory. Brother 1 yr younger chron's    Dad - CHF  71 heavy smoker    Mom - healthy    Reviewed, no changes. SH:    Marital:  - x2, getting . limited caffeine. own [facility]    Personal Habits: Cigarette Use: Does Not Smoke - occassional cigar. Alcohol: Drinks Alcoholic    Beverages Rarely. PHYSICAL EXAMINATION:  [ INSTRUCTIONS:  \"[x]\" Indicates a positive item  \"[]\" Indicates a negative item  -- DELETE ALL ITEMS NOT EXAMINED]  Vital Signs: (As obtained by patient/caregiver or practitioner observation)    There were no vitals filed for this visit.       Blood pressure-  Heart rate-    Respiratory rate-    Temperature-  Pulse oximetry-     Constitutional: [x] Appears well-developed and well-nourished [x] No apparent distress [] Abnormal-   Mental status  [x] Alert and awake  [x] Oriented to person/place/time [x]Able to follow commands      Eyes:  EOM    [x]  Normal  [] Abnormal-  Sclera  [x]  Normal  [] Abnormal -         Discharge [x]  None visible  [] Abnormal -    HENT:   [x] Normocephalic, atraumatic. [] Abnormal   [x] Mouth/Throat: Mucous membranes are moist.     External Ears [x] Normal  [] Abnormal-     Neck: [x] No visualized mass     Pulmonary/Chest: [x] Respiratory effort normal.  [x] No visualized signs of difficulty breathing or respiratory distress        [] Abnormal-      Musculoskeletal:   [x] Normal gait with no signs of ataxia         [x] Normal range of motion of neck        [] Abnormal-       Neurological:        [x] No Facial Asymmetry (Cranial nerve 7 motor function) (limited exam to video visit)          [x] No gaze palsy        [] Abnormal-         Skin:        [x] No significant exanthematous lesions or discoloration noted on facial skin         [] Abnormal-            Psychiatric:       [x] Normal Affect [x] No Hallucinations        [] Abnormal-     Other pertinent observable physical exam findings-     ASSESSMENT/PLAN:   Diagnosis Orders   1. COVID-19  amoxicillin-clavulanate (AUGMENTIN) 875-125 MG per tablet    predniSONE (DELTASONE) 10 MG tablet   2. Mixed hyperlipidemia     3. Anxiety and depression         No problem-specific Assessment & Plan notes found for this encounter. 1. COVID-19      Positive Covid. He previously had [employer] x1 and Moderna x1. We did discuss potential timeframe for immunity and recommendations for booster in the future. Counseled extensively. Potential comorbidities that may increase risk reviewed. Standard precautions reviewed. Quarantine recommendations reviewed. There was shared decision making throughout the process.   Counseled on the  risk and benefits of, and literature regarding the use of various over-the-counter products including (at appropriate doses and dosing intervals if no contraindication) Tylenol, zinc, vitamin C, probiotics etc.  Also counseled on appropriate hydration, potential role of prone position, use of nasal saline, coolmist vaporizer, honey, plain Mucinex and nasal steroids. Reviewed statements recommending against NSAIDs first-line and risks and benefits of this class of meds anyhow including GI/renal/cardiac/pulmonary as well as the contraindications for these types of meds. Limitations of virtual visits reviewed. Role and  ways of being seen in person reviewed ,role of emergency room reviewed. Discussed role of labs and imaging including chest imaging, declines at this time. The risk of secondary bacterial infection reviewed. The risk and benefits of antibiotic therapy reviewed with various options reviewed. Augmentin with precautions including C. difficile  We counseled on the role of systemic steroids, the potential risks and benefits of and various dosing options. Although he has had some anxiety in the past with prednisone he would like to try it again, discontinue if any ADRs, precautions reviewed  The role of and the potential risks and benefits of albuterol reviewed. Declines, Tessalon Perles-dunia  Counseled on role of antibody infusion/ paxlovid and timeframe indications, not interested. The unfortunate uncertainties and unpredictable nature of this virus reviewed. Various potential outcomes reviewed including complete recovery, debilitating sequelae which can be permanent as well as fatality. Certain aspects of this virus including the potential thrombotic risk can cause sudden debilitating/fatal events, without warning.   Therefore signs and symptoms to watch for reviewed at length, with very low threshold for reevaluation if needed and presentation to the emergency room immediately if any serious signs or symptoms which were reviewed at length, including the persistence or worsening of any symptoms mentioned above. The risk of postponing this reviewed. The patient verbalizes complete understanding, verbalizes understanding of various options and agrees to proceed as above. As long as symptoms steadily improve/resolve follow-up 2 weeks sooner as needed. Emphasized importance of close monitoring of   vital signs and other signs and symptoms. - amoxicillin-clavulanate (AUGMENTIN) 875-125 MG per tablet; Take 1 tablet by mouth 2 times daily for 10 days  Dispense: 20 tablet; Refill: 0  - predniSONE (DELTASONE) 10 MG tablet; Take 3 tablets by mouth daily for 2 days, THEN 2 tablets daily for 2 days, THEN 1 tablet daily for 2 days. Dispense: 12 tablet; Refill: 0    2. Mixed hyperlipidemia  Counseled. Awaiting blood work. We will discuss further at follow-up. Follow-up as above sooner as needed    Anxiety/depression  Stable on Cymbalta. Risk of prednisone reviewed      Plan as above. Counseled extensively and differential diagnoses relevant to above were reviewed, including serious etiologies, risks and complications, especially of left uncontrolled. If relevant, instructions and  alternatives to meds/treatment reviewed, as well as interactions, and  SE's/ADRs reviewed, notify immediately if any, discontinuing new meds if any. Plan made after discussion and shared decision making. He is going to follow-up in about 2 weeks on COVID, but going to vacation so probably going to get blood work shortly after that and follow-up again a week later to review and to consider Crestor sooner as needed. Precaution reviewed. As long as symptoms steadily improve/resolve and medical conditions are following the expected course, FU as below, sooner PRN      Return in about 2 weeks (around 7/22/2022), or if symptoms worsen or fail to improve.             Educational materials and/or home exercises printed for patient's review and were included in patient instructions on his/her After Visit Summary and given to patient at the end of visit. After discussion, patient and/or guardian verbalizes understanding, agrees, feels comfortable with and wishes to proceed with above treatment plan. Call for any pending results, FU sooner if abnormal, as needed or if any current symptoms persist/worsen. Advised patient to call with any new medication issues, and read all Rx info from pharmacy to assure aware of all possible risks and side effects of medication before taking. Reviewed age and gender appropriate health screening exams and vaccinations. Advised patient regarding importance of keeping up with recommended health maintenance and to schedule as soon as possible if overdue, as this is important in assessing for undiagnosed pathology, especially cancer, as well as protecting against potentially harmful/life threatening disease. Patient and/or guardian verbalizes understanding and agrees with above counseling, assessment and plan. All questions answered. Signs and symptoms to watch for discussed, serious signs and symptoms reviewed. ER if any. Time spent: Greater than [address], was evaluated through a synchronous (real-time) audio-video encounter. The patient (or guardian if applicable) is aware that this is a billable service, which includes applicable co-pays. This Virtual Visit was conducted with patient's (and/or legal guardian's) consent. The visit was conducted pursuant to the emergency declaration under the [address] [place] [address] waiver authority and the [employer] and [employer] General Act. Patient identification was verified, and a caregiver was present when appropriate. The patient was located in a state where the provider was licensed to provide care.   The patient (and/or legal guardian) has also been advised to contact this office for worsening conditions or problems, and seek emergency medical treatment and/or call 911 if deemed necessary. As this was a virtual encounter, patient (and /or legal guardian) was instructed on various ways to be seen in person. Patients are advised to check with insurance company to ensure coverage and to fully understand benefits and cost prior to any testing to try to avoid unexpected charges. This note was created with the assistance of voice recognition software. Inadvertent errors may be present. Signs and symptoms to watch for were discussed. Serious signs and symptoms reviewed. ER if any    --Pasquale Doan MD on 7/8/2022 at 10:36 AM    An electronic signature was used to authenticate this note.

## 2022-08-23 ENCOUNTER — OFFICE VISIT (OUTPATIENT)
Dept: ORTHOPEDIC SURGERY | Age: 62
End: 2022-08-23
Payer: COMMERCIAL

## 2022-08-23 VITALS — WEIGHT: 250 LBS | BODY MASS INDEX: 37.03 KG/M2 | HEIGHT: 69 IN

## 2022-08-23 DIAGNOSIS — M17.0 PRIMARY OSTEOARTHRITIS OF BOTH KNEES: Primary | ICD-10-CM

## 2022-08-23 PROCEDURE — 20610 DRAIN/INJ JOINT/BURSA W/O US: CPT | Performed by: ORTHOPAEDIC SURGERY

## 2022-08-23 RX ORDER — LIDOCAINE HYDROCHLORIDE 10 MG/ML
4 INJECTION, SOLUTION INFILTRATION; PERINEURAL ONCE
Status: COMPLETED | OUTPATIENT
Start: 2022-08-23 | End: 2022-08-23

## 2022-08-23 RX ORDER — BUPIVACAINE HYDROCHLORIDE 2.5 MG/ML
2 INJECTION, SOLUTION INFILTRATION; PERINEURAL ONCE
Status: COMPLETED | OUTPATIENT
Start: 2022-08-23 | End: 2022-08-23

## 2022-08-23 RX ORDER — TRIAMCINOLONE ACETONIDE 40 MG/ML
40 INJECTION, SUSPENSION INTRA-ARTICULAR; INTRAMUSCULAR ONCE
Status: COMPLETED | OUTPATIENT
Start: 2022-08-23 | End: 2022-08-23

## 2022-08-23 RX ADMIN — LIDOCAINE HYDROCHLORIDE 4 ML: 10 INJECTION, SOLUTION INFILTRATION; PERINEURAL at 11:41

## 2022-08-23 RX ADMIN — BUPIVACAINE HYDROCHLORIDE 5 MG: 2.5 INJECTION, SOLUTION INFILTRATION; PERINEURAL at 11:42

## 2022-08-23 RX ADMIN — LIDOCAINE HYDROCHLORIDE 4 ML: 10 INJECTION, SOLUTION INFILTRATION; PERINEURAL at 11:42

## 2022-08-23 RX ADMIN — TRIAMCINOLONE ACETONIDE 40 MG: 40 INJECTION, SUSPENSION INTRA-ARTICULAR; INTRAMUSCULAR at 11:42

## 2022-08-23 RX ADMIN — BUPIVACAINE HYDROCHLORIDE 5 MG: 2.5 INJECTION, SOLUTION INFILTRATION; PERINEURAL at 11:40

## 2022-08-23 RX ADMIN — TRIAMCINOLONE ACETONIDE 40 MG: 40 INJECTION, SUSPENSION INTRA-ARTICULAR; INTRAMUSCULAR at 11:40

## 2022-08-23 NOTE — PATIENT INSTRUCTIONS
Activity tolerated    Call with any issues with injection site    Call sooner with any questions or concerns

## 2022-08-23 NOTE — PROGRESS NOTES
Chief Complaint   Patient presents with    Follow Up After Procedure     Bilateral CSI admin on 5/3/22    Knee Pain     L knee constant pain from hip to ankle. . Right side is just stiff. Maggi Hastings CSI worked fantastic and wore off over the last 3 weeks       SUBJECTIVE: Patient here for follow-up for bilateral knee pain. He was injected on 5/3/2022. The injections worked great. He said this started wear off about 3 weeks ago. His right knee is still feeling well with most of his left knee and this bothers him although his right knee is starting to stiffen up. He denies any new injuries. He was happy with his results from the injection would like a repeat injection today. Past Medical History:   Diagnosis Date    Disorder of prostate     Dysthymic disorder     GERD (gastroesophageal reflux disease)     Migraine     Tinea unguium     Vitamin D deficiency      Past Surgical History:   Procedure Laterality Date    CHOLECYSTECTOMY, LAPAROSCOPIC      HERNIA REPAIR       . [facility]  Social History     Tobacco Use    Smoking status: Never    Smokeless tobacco: Never   Vaping Use    Vaping Use: Never used   Substance Use Topics    Alcohol use: No    Drug use: No     Allergies   Allergen Reactions    Prednisone Anxiety           Review of Systems -   General ROS: negative for - chills, fatigue, fever or night sweats  Respiratory ROS: no cough, shortness of breath, or wheezing  Cardiovascular ROS: no chest pain or dyspnea on exertion  Gastrointestinal ROS: no abdominal pain, change in bowel habits, or black or bloody stools  Genitourinary: no hematuria, dysuria, or incontinence   Musculoskeletal ROS:see above  Neurological ROS: no TIA or stroke symptoms       OBJECTIVE:   Alert and oriented X 3, no acute distress, respirations easy and unlabored with no audible wheezes, skin warm and dry, speech and dress appropriate for noted age, affect euthymic. Extremity:    Bilateral knees   Skin intact. No effusion noted bilaterally. No joint line tenderness to palpation bilaterally. Stable to varus and valgus at 30 degrees of flexion bilaterally. Negative Lachman's and posterior drawer bilaterally. Compartments soft and compressible. NVID. 2/4 DP and PT pulses. Mild crepitus on range of motion with good patellar tracking bilaterally. Calves soft and NT.     5/5 EHL, TA, GS. Range of motion is -3 to 105 degrees bilaterally    XR: none taken today    Ht 5' 9\" (1.753 m)   Wt 250 lb (113.4 kg) Comment: per pt  BMI 36.92 kg/m²     ASSESSMENT:     Diagnosis Orders   1. Primary osteoarthritis of both knees  CO ARTHROCENTESIS ASPIR&/INJ MAJOR JT/BURSA W/O US    bupivacaine (MARCAINE) 0.25 % injection 5 mg    lidocaine 1 % injection 4 mL    triamcinolone acetonide (KENALOG-40) injection 40 mg    CO ARTHROCENTESIS ASPIR&/INJ MAJOR JT/BURSA W/O US    lidocaine 1 % injection 4 mL    triamcinolone acetonide (KENALOG-40) injection 40 mg    bupivacaine (MARCAINE) 0.25 % injection 5 mg              Discussion: Abdomen detail again about injection causing cartilage irritation and possible infection. He understands like to proceed with bilateral knee injection with steroids        Procedure Note  Skin prepped with alcohol.  21ga. Needle used to inject 3cc 1% Lidocaine, 3cc 0.25% Marcaine, and 1cc Kenalog into the right knee through anterior medial portal.  Patient tolerated well and band aid applied. Walked out of the office with no issues.   This was repeated on the left knee with no issues          PLAN:  Activity tolerated    Call with any issues with injection site    Call sooner with any questions or concerns    ELECTRONICALLY signed by:    Veronica Kendall MD  8/23/22

## 2022-09-20 DIAGNOSIS — R73.9 HYPERGLYCEMIA: ICD-10-CM

## 2022-09-20 DIAGNOSIS — F32.A ANXIETY AND DEPRESSION: ICD-10-CM

## 2022-09-20 DIAGNOSIS — E53.8 VITAMIN B12 DEFICIENCY: ICD-10-CM

## 2022-09-20 DIAGNOSIS — E78.2 MIXED HYPERLIPIDEMIA: ICD-10-CM

## 2022-09-20 DIAGNOSIS — F41.9 ANXIETY AND DEPRESSION: ICD-10-CM

## 2022-09-20 DIAGNOSIS — K21.9 GASTROESOPHAGEAL REFLUX DISEASE, UNSPECIFIED WHETHER ESOPHAGITIS PRESENT: ICD-10-CM

## 2022-09-20 DIAGNOSIS — E55.9 VITAMIN D DEFICIENCY: ICD-10-CM

## 2022-09-20 DIAGNOSIS — N42.9 PROSTATE DISORDER: ICD-10-CM

## 2022-09-20 LAB
ALBUMIN SERPL-MCNC: 3.9 G/DL (ref 3.5–5.2)
ALP BLD-CCNC: 63 U/L (ref 40–129)
ALT SERPL-CCNC: 19 U/L (ref 0–40)
ANION GAP SERPL CALCULATED.3IONS-SCNC: 15 MMOL/L (ref 7–16)
AST SERPL-CCNC: 23 U/L (ref 0–39)
BASOPHILS ABSOLUTE: 0.06 E9/L (ref 0–0.2)
BASOPHILS RELATIVE PERCENT: 1 % (ref 0–2)
BILIRUB SERPL-MCNC: 0.4 MG/DL (ref 0–1.2)
BILIRUBIN URINE: NEGATIVE
BLOOD, URINE: NEGATIVE
BUN BLDV-MCNC: 22 MG/DL (ref 6–23)
CALCIUM SERPL-MCNC: 9.1 MG/DL (ref 8.6–10.2)
CHLORIDE BLD-SCNC: 107 MMOL/L (ref 98–107)
CHOLESTEROL, TOTAL: 174 MG/DL (ref 0–199)
CLARITY: CLEAR
CO2: 21 MMOL/L (ref 22–29)
COLOR: YELLOW
CREAT SERPL-MCNC: 0.9 MG/DL (ref 0.7–1.2)
CREATININE URINE: 184 MG/DL (ref 40–278)
EOSINOPHILS ABSOLUTE: 0.18 E9/L (ref 0.05–0.5)
EOSINOPHILS RELATIVE PERCENT: 3 % (ref 0–6)
FOLATE: 9.2 NG/ML (ref 4.8–24.2)
GFR AFRICAN AMERICAN: >60
GFR NON-AFRICAN AMERICAN: >60 ML/MIN/1.73
GLUCOSE BLD-MCNC: 113 MG/DL (ref 74–99)
GLUCOSE URINE: NEGATIVE MG/DL
HBA1C MFR BLD: 5.8 % (ref 4–5.6)
HCT VFR BLD CALC: 42.5 % (ref 37–54)
HDLC SERPL-MCNC: 45 MG/DL
HEMOGLOBIN: 13.5 G/DL (ref 12.5–16.5)
IMMATURE GRANULOCYTES #: 0.02 E9/L
IMMATURE GRANULOCYTES %: 0.3 % (ref 0–5)
KETONES, URINE: NEGATIVE MG/DL
LDL CHOLESTEROL CALCULATED: 114 MG/DL (ref 0–99)
LEUKOCYTE ESTERASE, URINE: NEGATIVE
LYMPHOCYTES ABSOLUTE: 2.07 E9/L (ref 1.5–4)
LYMPHOCYTES RELATIVE PERCENT: 34.7 % (ref 20–42)
MCH RBC QN AUTO: 30 PG (ref 26–35)
MCHC RBC AUTO-ENTMCNC: 31.8 % (ref 32–34.5)
MCV RBC AUTO: 94.4 FL (ref 80–99.9)
MICROALBUMIN UR-MCNC: <12 MG/L
MICROALBUMIN/CREAT UR-RTO: ABNORMAL (ref 0–30)
MONOCYTES ABSOLUTE: 0.62 E9/L (ref 0.1–0.95)
MONOCYTES RELATIVE PERCENT: 10.4 % (ref 2–12)
NEUTROPHILS ABSOLUTE: 3.02 E9/L (ref 1.8–7.3)
NEUTROPHILS RELATIVE PERCENT: 50.6 % (ref 43–80)
NITRITE, URINE: NEGATIVE
PDW BLD-RTO: 13.2 FL (ref 11.5–15)
PH UA: 6 (ref 5–9)
PLATELET # BLD: 214 E9/L (ref 130–450)
PMV BLD AUTO: 11.4 FL (ref 7–12)
POTASSIUM SERPL-SCNC: 4.4 MMOL/L (ref 3.5–5)
PROSTATE SPECIFIC ANTIGEN: 3.12 NG/ML (ref 0–4)
PROTEIN UA: NEGATIVE MG/DL
RBC # BLD: 4.5 E12/L (ref 3.8–5.8)
SODIUM BLD-SCNC: 143 MMOL/L (ref 132–146)
SPECIFIC GRAVITY UA: 1.02 (ref 1–1.03)
TOTAL CK: 263 U/L (ref 20–200)
TOTAL PROTEIN: 6.6 G/DL (ref 6.4–8.3)
TRIGL SERPL-MCNC: 73 MG/DL (ref 0–149)
TSH SERPL DL<=0.05 MIU/L-ACNC: 2.1 UIU/ML (ref 0.27–4.2)
UROBILINOGEN, URINE: 0.2 E.U./DL
VITAMIN B-12: 554 PG/ML (ref 211–946)
VITAMIN D 25-HYDROXY: 29 NG/ML (ref 30–100)
VLDLC SERPL CALC-MCNC: 15 MG/DL
WBC # BLD: 6 E9/L (ref 4.5–11.5)

## 2022-09-23 ENCOUNTER — OFFICE VISIT (OUTPATIENT)
Dept: PRIMARY CARE CLINIC | Age: 62
End: 2022-09-23
Payer: COMMERCIAL

## 2022-09-23 VITALS
BODY MASS INDEX: 36.03 KG/M2 | DIASTOLIC BLOOD PRESSURE: 62 MMHG | HEART RATE: 55 BPM | WEIGHT: 244 LBS | SYSTOLIC BLOOD PRESSURE: 124 MMHG | TEMPERATURE: 97.6 F | OXYGEN SATURATION: 97 %

## 2022-09-23 DIAGNOSIS — E78.2 MIXED HYPERLIPIDEMIA: Primary | ICD-10-CM

## 2022-09-23 DIAGNOSIS — Z00.00 HEALTH MAINTENANCE EXAMINATION: ICD-10-CM

## 2022-09-23 DIAGNOSIS — K21.9 GASTROESOPHAGEAL REFLUX DISEASE, UNSPECIFIED WHETHER ESOPHAGITIS PRESENT: ICD-10-CM

## 2022-09-23 DIAGNOSIS — M51.36 DDD (DEGENERATIVE DISC DISEASE), LUMBAR: ICD-10-CM

## 2022-09-23 DIAGNOSIS — R73.9 HYPERGLYCEMIA: ICD-10-CM

## 2022-09-23 DIAGNOSIS — E53.8 VITAMIN B12 DEFICIENCY: ICD-10-CM

## 2022-09-23 DIAGNOSIS — Z23 ENCOUNTER FOR IMMUNIZATION: ICD-10-CM

## 2022-09-23 DIAGNOSIS — F32.A ANXIETY AND DEPRESSION: ICD-10-CM

## 2022-09-23 DIAGNOSIS — F41.9 ANXIETY AND DEPRESSION: ICD-10-CM

## 2022-09-23 DIAGNOSIS — E55.9 VITAMIN D DEFICIENCY: ICD-10-CM

## 2022-09-23 DIAGNOSIS — G47.33 OSA (OBSTRUCTIVE SLEEP APNEA): ICD-10-CM

## 2022-09-23 DIAGNOSIS — N42.9 PROSTATE DISORDER: ICD-10-CM

## 2022-09-23 PROCEDURE — 99214 OFFICE O/P EST MOD 30 MIN: CPT | Performed by: FAMILY MEDICINE

## 2022-09-23 PROCEDURE — 90674 CCIIV4 VAC NO PRSV 0.5 ML IM: CPT | Performed by: FAMILY MEDICINE

## 2022-09-23 PROCEDURE — 90471 IMMUNIZATION ADMIN: CPT | Performed by: FAMILY MEDICINE

## 2022-09-23 RX ORDER — ROSUVASTATIN CALCIUM 10 MG/1
10 TABLET, COATED ORAL
Qty: 30 TABLET | Refills: 1 | Status: SHIPPED | OUTPATIENT
Start: 2022-09-23

## 2022-09-23 RX ORDER — OMEPRAZOLE 20 MG/1
20 CAPSULE, DELAYED RELEASE ORAL DAILY
Qty: 30 CAPSULE | Refills: 12 | Status: SHIPPED | OUTPATIENT
Start: 2022-09-23

## 2022-09-23 RX ORDER — DULOXETIN HYDROCHLORIDE 60 MG/1
60 CAPSULE, DELAYED RELEASE ORAL DAILY
Qty: 30 CAPSULE | Refills: 12 | Status: SHIPPED | OUTPATIENT
Start: 2022-09-23

## 2022-09-23 SDOH — ECONOMIC STABILITY: FOOD INSECURITY: WITHIN THE PAST 12 MONTHS, YOU WORRIED THAT YOUR FOOD WOULD RUN OUT BEFORE YOU GOT MONEY TO BUY MORE.: NEVER TRUE

## 2022-09-23 SDOH — ECONOMIC STABILITY: FOOD INSECURITY: WITHIN THE PAST 12 MONTHS, THE FOOD YOU BOUGHT JUST DIDN'T LAST AND YOU DIDN'T HAVE MONEY TO GET MORE.: NEVER TRUE

## 2022-09-23 ASSESSMENT — PATIENT HEALTH QUESTIONNAIRE - PHQ9
SUM OF ALL RESPONSES TO PHQ QUESTIONS 1-9: 0
9. THOUGHTS THAT YOU WOULD BE BETTER OFF DEAD, OR OF HURTING YOURSELF: 0
10. IF YOU CHECKED OFF ANY PROBLEMS, HOW DIFFICULT HAVE THESE PROBLEMS MADE IT FOR YOU TO DO YOUR WORK, TAKE CARE OF THINGS AT HOME, OR GET ALONG WITH OTHER PEOPLE: 0
1. LITTLE INTEREST OR PLEASURE IN DOING THINGS: 0
5. POOR APPETITE OR OVEREATING: 0
3. TROUBLE FALLING OR STAYING ASLEEP: 0
8. MOVING OR SPEAKING SO SLOWLY THAT OTHER PEOPLE COULD HAVE NOTICED. OR THE OPPOSITE, BEING SO FIGETY OR RESTLESS THAT YOU HAVE BEEN MOVING AROUND A LOT MORE THAN USUAL: 0
7. TROUBLE CONCENTRATING ON THINGS, SUCH AS READING THE NEWSPAPER OR WATCHING TELEVISION: 0
SUM OF ALL RESPONSES TO PHQ QUESTIONS 1-9: 0
SUM OF ALL RESPONSES TO PHQ QUESTIONS 1-9: 0
6. FEELING BAD ABOUT YOURSELF - OR THAT YOU ARE A FAILURE OR HAVE LET YOURSELF OR YOUR FAMILY DOWN: 0
4. FEELING TIRED OR HAVING LITTLE ENERGY: 0
2. FEELING DOWN, DEPRESSED OR HOPELESS: 0
SUM OF ALL RESPONSES TO PHQ9 QUESTIONS 1 & 2: 0
SUM OF ALL RESPONSES TO PHQ QUESTIONS 1-9: 0

## 2022-09-23 ASSESSMENT — SOCIAL DETERMINANTS OF HEALTH (SDOH): HOW HARD IS IT FOR YOU TO PAY FOR THE VERY BASICS LIKE FOOD, HOUSING, MEDICAL CARE, AND HEATING?: NOT HARD AT ALL

## 2022-09-23 NOTE — PROGRESS NOTES
Mandie Running : 1960 Sex: male  Age: 57 y.o. Chief Complaint   Patient presents with    Discuss Labs    Hip Pain     Left     Immunizations       HPI:     Presents today for follow-up. Generally feels well. Following with allergist, he did request a sleep study through the allergist, it is done but he has not received results yet. Does admit to snoring and daytime fatigue no narcoleptic symptoms. He has had chronic low back issues follows with chiropractor. Getting some pain in the left outer hip and down the leg at times no numbness tingling weakness. Offered x-rays of low back hip and recommendations but he defers now to the chiropractor unless persists or worsens. Precaution reviewed. Felt dull achy at 1 point on Crestor so stopped it but is not sure if this was related. He would like to restart it if it happens again we will discuss other options. Needs other refills      The 10-year ASCVD risk score (Marine Stacy., et al., 2013) is: 10.8%    Values used to calculate the score:      Age: 57 years      Sex: Male      Is Non- : No      Diabetic: No      Tobacco smoker: No      Systolic Blood Pressure: 450 mmHg      Is BP treated: No      HDL Cholesterol: 45 mg/dL      Total Cholesterol: 174 mg/dL    Blood work reviewed, CMP shows CO2 21 glucose 113 with a hemoglobin A1c down to 5.8 otherwise normal HDL up to 45 LDL down to 114 triglyceride 73 but ASCVD calculator reviewed TSH 2.1 vitamin D 29 CBC grossly normal differential reviewed R92 folic acid normal PSA 7.75 which has been relatively stable.   Encourage follow-up with urology urinalysis negative microalbumin creatinine ratio-NC      Most Recent Labs  CBC  Lab Results   Component Value Date/Time    WBC 6.0 2022 07:08 AM    WBC 5.0 2022 08:03 AM    WBC 4.9 08/10/2021 08:02 AM    RBC 4.50 2022 07:08 AM    RBC 4.60 2022 08:03 AM    RBC 4.58 08/10/2021 08:02 AM    HGB 13.5 2022 07:08 AM    HGB 13.6 02/11/2022 08:03 AM    HGB 13.6 08/10/2021 08:02 AM    HCT 42.5 09/20/2022 07:08 AM    HCT 42.4 02/11/2022 08:03 AM    HCT 42.4 08/10/2021 08:02 AM    MCV 94.4 09/20/2022 07:08 AM    MCV 92.2 02/11/2022 08:03 AM    MCV 92.6 08/10/2021 08:02 AM     09/20/2022 07:08 AM     02/11/2022 08:03 AM     08/10/2021 08:02 AM      CMP  Lab Results   Component Value Date/Time     09/20/2022 07:08 AM     03/15/2022 07:45 AM     02/11/2022 08:03 AM    K 4.4 09/20/2022 07:08 AM    K 4.6 03/15/2022 07:45 AM    K 4.8 02/11/2022 08:03 AM     09/20/2022 07:08 AM     03/15/2022 07:45 AM     02/11/2022 08:03 AM    CO2 21 09/20/2022 07:08 AM    CO2 24 03/15/2022 07:45 AM    CO2 22 02/11/2022 08:03 AM    ANIONGAP 15 09/20/2022 07:08 AM    ANIONGAP 10 03/15/2022 07:45 AM    ANIONGAP 16 02/11/2022 08:03 AM    GLUCOSE 113 09/20/2022 07:08 AM    GLUCOSE 105 03/15/2022 07:45 AM    GLUCOSE 117 02/11/2022 08:03 AM    BUN 22 09/20/2022 07:08 AM    BUN 25 03/15/2022 07:45 AM    BUN 27 02/11/2022 08:03 AM    CREATININE 0.9 09/20/2022 07:08 AM    CREATININE 0.9 03/15/2022 07:45 AM    CREATININE 1.0 02/11/2022 08:03 AM    CREATININE 1.0 01/17/2019 12:00 AM    LABGLOM >60 09/20/2022 07:08 AM    LABGLOM >60 03/15/2022 07:45 AM    LABGLOM >60 02/11/2022 08:03 AM    GFRAA >60 09/20/2022 07:08 AM    GFRAA >60 03/15/2022 07:45 AM    GFRAA >60 02/11/2022 08:03 AM    CALCIUM 9.1 09/20/2022 07:08 AM    CALCIUM 9.0 03/15/2022 07:45 AM    CALCIUM 9.4 02/11/2022 08:03 AM    PROT 6.6 09/20/2022 07:08 AM    PROT 6.6 03/15/2022 07:45 AM    PROT 6.8 02/11/2022 08:03 AM    LABALBU 3.9 09/20/2022 07:08 AM    LABALBU 3.9 03/15/2022 07:45 AM    LABALBU 4.2 02/11/2022 08:03 AM    BILITOT 0.4 09/20/2022 07:08 AM    BILITOT 0.3 03/15/2022 07:45 AM    BILITOT 0.5 02/11/2022 08:03 AM    ALKPHOS 63 09/20/2022 07:08 AM    ALKPHOS 56 03/15/2022 07:45 AM    ALKPHOS 53 02/11/2022 08:03 AM    AST 23 09/20/2022 07:08 AM    AST 18 03/15/2022 07:45 AM    AST 20 02/11/2022 08:03 AM    ALT 19 09/20/2022 07:08 AM    ALT 16 03/15/2022 07:45 AM    ALT 19 02/11/2022 08:03 AM     A1C  Lab Results   Component Value Date/Time    LABA1C 5.8 09/20/2022 07:08 AM    LABA1C 5.9 02/11/2022 08:03 AM    LABA1C 5.7 07/08/2021 08:18 AM     TSH  Lab Results   Component Value Date/Time    TSH 2.100 09/20/2022 07:08 AM    TSH 1.340 02/11/2022 08:03 AM    TSH 1.280 08/10/2021 08:02 AM     FREET4  No results found for: R2YSRWA  LIPID  Lab Results   Component Value Date/Time    CHOL 174 09/20/2022 07:08 AM    CHOL 168 03/15/2022 07:45 AM    CHOL 183 02/11/2022 08:03 AM    HDL 45 09/20/2022 07:08 AM    HDL 38 03/15/2022 07:45 AM    HDL 43 02/11/2022 08:03 AM    LDLCALC 114 09/20/2022 07:08 AM    LDLCALC 116 03/15/2022 07:45 AM    LDLCALC 120 02/11/2022 08:03 AM    TRIG 73 09/20/2022 07:08 AM    TRIG 71 03/15/2022 07:45 AM    TRIG 102 02/11/2022 08:03 AM    CHOLHDLRATIO 4.4 01/17/2019 12:00 AM     VITAMIN D  Lab Results   Component Value Date/Time    VITD25 29 09/20/2022 07:08 AM    VITD25 22 02/11/2022 08:03 AM    VITD25 29 08/10/2021 08:02 AM     MAGNESIUM  No results found for: MG   PHOS  No results found for: PHOS   ASHU   No results found for: ASHU  RHEUMATOID FACTOR  No results found for: RF  PSA  Lab Results   Component Value Date/Time    PSA 3.12 09/20/2022 07:08 AM    PSA 2.97 02/11/2022 08:03 AM    PSA 4.62 08/10/2021 08:02 AM      HEPATITIS C  Lab Results   Component Value Date/Time    HCVABI Non-Reactive 03/15/2022 07:45 AM     HIV  No results found for: PFL3QKT, HIV1QT  UA  Lab Results   Component Value Date/Time    COLORU Yellow 09/20/2022 07:07 AM    COLORU Yellow 02/11/2022 08:03 AM    COLORU Yellow 08/10/2021 08:03 AM    CLARITYU Clear 09/20/2022 07:07 AM    CLARITYU Clear 02/11/2022 08:03 AM    CLARITYU Clear 08/10/2021 08:03 AM    GLUCOSEU Negative 09/20/2022 07:07 AM    GLUCOSEU Negative 02/11/2022 08:03 AM    GLUCOSEU Negative 08/10/2021 08:03 AM    BILIRUBINUR Negative 09/20/2022 07:07 AM    BILIRUBINUR Negative 02/11/2022 08:03 AM    BILIRUBINUR Negative 08/10/2021 08:03 AM    KETUA Negative 09/20/2022 07:07 AM    KETUA Negative 02/11/2022 08:03 AM    KETUA Negative 08/10/2021 08:03 AM    SPECGRAV 1.020 09/20/2022 07:07 AM    SPECGRAV 1.020 02/11/2022 08:03 AM    SPECGRAV 1.020 08/10/2021 08:03 AM    BLOODU Negative 09/20/2022 07:07 AM    BLOODU Negative 02/11/2022 08:03 AM    BLOODU Negative 08/10/2021 08:03 AM    PHUR 6.0 09/20/2022 07:07 AM    PHUR 6.5 02/11/2022 08:03 AM    PHUR 6.5 08/10/2021 08:03 AM    PROTEINU Negative 09/20/2022 07:07 AM    PROTEINU Negative 02/11/2022 08:03 AM    PROTEINU Negative 08/10/2021 08:03 AM    UROBILINOGEN 0.2 09/20/2022 07:07 AM    UROBILINOGEN 0.2 02/11/2022 08:03 AM    UROBILINOGEN 1.0 08/10/2021 08:03 AM    NITRU Negative 09/20/2022 07:07 AM    NITRU Negative 02/11/2022 08:03 AM    NITRU Negative 08/10/2021 08:03 AM    LEUKOCYTESUR Negative 09/20/2022 07:07 AM    LEUKOCYTESUR TRACE 02/11/2022 08:03 AM    LEUKOCYTESUR Negative 08/10/2021 08:03 AM     Urine Micro/Albumin Ratio  Lab Results   Component Value Date/Time    MALBCR - 09/20/2022 07:07 AM         Review of Systems  ROS:  Const: Denies chills, fever, malaise and sweats. Eyes: Denies discharge, pain, redness and visual disturbance. ENMT: Denies earaches, other ear symptoms. Denies nasal or sinus symptoms other than stated  above. Denies mouth and tongue lesions and sore throat. CV: Denies chest discomfort, pain; diaphoresis, dizziness, edema, lightheadedness, orthopnea,  palpitations, syncope and near syncopal episode or any exertional symptoms  Resp: Denies cough, hemoptysis, pleuritic pain, SOB, sputum production and wheezing. GI: Denies abdominal pain, change in bowel habits, hematochezia, melena, nausea and vomiting. : Denies urinary symptoms including dysuria , urgency, frequency or hematuria.   Musculo: Chronic knee pain left low back and outer hip pain down the leg at times as above  Skin: Denies bruising and rash. Neuro: Denies headache, numbness, stiff neck, tingling and focal weakness slurred speech or facial  droop  Hema/Lymph: Denies bleeding/bruising tendency and enlarged lymph nodes        Current Outpatient Medications:     omeprazole (PRILOSEC) 20 MG delayed release capsule, Take 1 capsule by mouth daily, Disp: 30 capsule, Rfl: 12    DULoxetine (CYMBALTA) 60 MG extended release capsule, Take 1 capsule by mouth daily, Disp: 30 capsule, Rfl: 12    rosuvastatin (CRESTOR) 10 MG tablet, Take 1 tablet by mouth Daily with supper, Disp: 30 tablet, Rfl: 1    diclofenac (VOLTAREN) 50 MG EC tablet, Take 1 tablet by mouth 2 times daily (with meals), Disp: 60 tablet, Rfl: 1    tadalafil (CIALIS) 5 MG tablet, Take 5 mg by mouth daily, Disp: , Rfl:     rOPINIRole (REQUIP) 0.25 MG tablet, Take 1 tablet by mouth nightly (Patient not taking: Reported on 9/23/2022), Disp: 90 tablet, Rfl: 3     Allergies   Allergen Reactions    Prednisone Anxiety       Past Medical History:   Diagnosis Date    Disorder of prostate     Dysthymic disorder     GERD (gastroesophageal reflux disease)     Migraine     Tinea unguium     Vitamin D deficiency      Past Surgical History:   Procedure Laterality Date    CHOLECYSTECTOMY, LAPAROSCOPIC      HERNIA REPAIR       No family history on file. Social History     Tobacco Use    Smoking status: Never    Smokeless tobacco: Never   Vaping Use    Vaping Use: Never used   Substance Use Topics    Alcohol use: No    Drug use: No      Social History     Social History Narrative    PMH:    Health Maintenance:    Influenza Vaccination - (9/12/2017)    Dr Mariel Rodriguez - nl exam 6/13    Medical Problems:    Ulcers - Angie Morillo. Has had EGD 4/13 (3 ulcers, erosion of stomach). repeat 9/13. repeat 2015    Last colonoscopy 2015, 4/13,; nl. had 2010, nl. GERD    migraines - follows with Dr Ra Fisher; typically around allergy season. started age 25. cervical DDD    overactive bladder - Dr Jael Ford spur - Dr Erika Payan; surgery    Had measles/chicken pox    OW[facility]    Surgical Hx:    Glenny    Hernia repair - Rt ; Dr Madilyn Brittle    Vasectomy, T & A    Reviewed, no changes. FH:    Father:    Non Contributory. Mother:    Non Contributory. Brother 1 yr younger chron's    Dad - CHF  71 heavy smoker    Mom - healthy    Reviewed, no changes. SH:    Marital:  - x2, getting . limited caffeine. own [place]    Personal Habits: Cigarette Use: Does Not Smoke - occassional cigar. Alcohol: Drinks Alcoholic    Beverages Rarely. Vitals:    22 0902   BP: 124/62   Pulse: 55   Temp: 97.6 °F (36.4 °C)   SpO2: 97%   Weight: 244 lb (110.7 kg)       Physical Exam  Exam:  Const: Appears comfortable. No signs of acute distress present. Head/Face: Atraumatic on inspection. Eyes: EOMI in both eyes. No discharge from the eyes. PERRL. Sclerae clear. ENMT: Auditory canals normal. Tympanic membranes: intact and translucent. External nose WNL. Nasal mucosa is clear. Oropharynx: No erythema or exudate. Posterior pharynx is normal.  Neck: Supple. Palpation reveals no adenopathy. No masses appreciated. No JVD. Carotids: no  bruits. Resp: Respirations are unlabored. Clear to auscultation. No rales, rhonchi or wheezes appreciated  over the lungs bilaterally. CV: Rate is regular. Rhythm is regular. No gallop or rubs. No heart murmur appreciated. Extremities: No clubbing, cyanosis, or edema. No calf inflammation or tenderness. Abdomen: Bowel sounds are normoactive. Abdomen is soft, nontender, and nondistended. No  abdominal masses. No palpable hepatosplenomegaly. Genitalia: defers to urology  Rectal: defers urology  Lymph: No palpable or visible regional lymphadenopathy. Musculoskeletal: no acute joint inflammation. ,  Bilateral knee crepitus counseled, limited range of motion low back, hip exam unremarkable  Skin: Dry and warm with no rash. Skin normal to inspection and palpation overall. Neuro: Alert and oriented. Affect: appropriate. Upper Extremities: 5/5 bilaterally. Lower Extremities:  5/5 bilaterally. Sensation intact to light touch. Reflexes: DTR's are symmetric and 2+ bilaterally. .  Cranial Nerves: Cranial nerves grossly intact. Assessment and Plan:   Diagnosis Orders   1. Mixed hyperlipidemia  rosuvastatin (CRESTOR) 10 MG tablet    Lipid Panel    Comprehensive Metabolic Panel    CK      2. Gastroesophageal reflux disease, unspecified whether esophagitis present  omeprazole (PRILOSEC) 20 MG delayed release capsule      3. Anxiety and depression  DULoxetine (CYMBALTA) 60 MG extended release capsule      4. DDD (degenerative disc disease), lumbar        5. Prostate disorder        6. Hyperglycemia        7. Vitamin D deficiency        8. Vitamin B12 deficiency        9. Health maintenance examination        10. Encounter for immunization  Influenza, FLUCELVAX, (age 10 mo+), IM, Preservative Free, 0.5 mL      11. NEIL (obstructive sleep apnea)               Class 2 obesity due to excess calories without serious comorbidity with body mass index (BMI) of 37.0 to 37.9 in adult    lifestyle modification emphasized. Defers formal intervention. Risk of obesity reviewed. Denies NEIL symptoms. Counseled on Mediterranean diet and weight watchers. He has successfully lost weight since last time. Vitamin B12 deficiency    history of, continue B12 sublingual 1000 mcg daily, levels normal    Plantar fasciitis, left    counseled, continue per Dr. Dlae Singh, has had injection in the past      Chronic pain of both knees    Dr. Dale Singh referred to Dr. Joy Reyna. Counseled on use of Tylenol, topicals, heat/ice, range of motion    Allergic rhinitis due to allergen    stable with injections    Vertigo    counseled, differential reviewed, thoroughly investigated and worked up by Dr. Skyler Conte decades ago, has been stable for decades.   Intolerant to meclizine because of sedation. It is only with certain position particularly lying doing overhead activities such as working on a car/under a sink. Declines further evaluation/treatment at this time     Health maintenance examination  Health maintenance issues discussed at length  8/16/2021. Encouraged yearly physicals. We vaccine today. Metabolic syndrome  Counseled extensively. Lifestyle modification reviewed. Importance of diet exercise and weight loss reviewed. Micro-macrovascular complications reviewed. Stable. He was intolerant to Metformin XR and we will monitor at this time, defers other pharmacotherapy at this time. Monitor. Hyperglycemia  Counseled. See comments under metabolic syndrome. Gastroesophageal reflux disease without esophagitis  Patient completely asymptomatic, followed with Dr. Husam Fortune. Omeprazole 20 mg daily, previously 40 mg with risks benefits reviewed including masking underlying etiologies, risk of calcium else works and renal insufficiency, C. diff and otherwise. Had endoscopy around 2018 appropriate timing of dosing of PPI reviewed. Also notices benefit on lactose-free. Discussed if symptom-free he may wean and stop this notify us if any recurrence. He has noticed symptoms without medicine, and if he knows he is going to eat poorly he will take an extra omeprazole with good control. Refilled. Dysthymia  Stable and asymptomatic on medication. Tolerating well. Risk benefits and ADRs reviewed. Appropriate counseling reviewed. Adamantly denies SI/HI. Appropriate timing of dosing of Cymbalta reviewed. He did temporarily reduce to 30 mg daily but had breakthrough so back on 60 mg doing well, refilled      Disorder of prostate  ASX. Following with Dr. Manuela Mitchell,, had MRI that was negative he states, he should follow-up    Vitamin D deficiency  Appropriate supplementation reviewed, encourage vitamin D3 2000 IUs daily.   He does miss doses, compliance reviewed     Mixed hyperlipidemia  Elevated ASCVD. Counseled. Does not think he tolerated Crestor but not sure. He would like to retry Crestor standard precautions interactions ADRs reviewed discontinue if any. Otherwise blood work 1 month follow-up 5 weeks sooner as needed. Counseled vitamin D and co-Q10. Risk of hyperlipidemia reviewed. Risk benefits of statin reviewed. Lifestyle Acacian reviewed. Alternative meds reviewed    DDD LS spine  Counseled extensively. Differential reviewed, including serious etiologies. At this point defers further intervention for me, he is going to follow with his chiropractor. He will let us know if it persists or worsens. Counseled on heat/ice, topicals    NEIL  Suspected. He did get a sleep study completed through his allergist-results pending. Risk of untreated NEIL reviewed. No narcoleptic symptoms. Standard precaution reviewed. Plan as above. Counseled extensively and differential diagnoses relevant to above were reviewed, including serious etiologies, risks and complications, especially of left uncontrolled. If relevant, instructions and  alternatives to meds/treatment reviewed, as well as interactions, and  SE's/ADRs reviewed, notify immediately if any, discontinuing new meds if any. Plan made after discussion and shared decision making. As above, restart Crestor, blood work 1 month follow-up 5 weeks sooner as needed      As long as symptoms steadily improve/resolve, and medical conditions follow the expected course, FU as below, sooner PRN. Return in about 5 weeks (around 10/28/2022), or if symptoms worsen or fail to improve, for virtual.                Educational materials and/or home exercises printed for patient's review and were included in patient instructions on his/her After Visit Summary and given to patient at the end of visit.        After discussion, patient and/or guardian verbalizes understanding, agrees, feels comfortable with and wishes to proceed with above treatment plan. Call for any pending results, FU sooner if abnormal, as needed or if any current symptoms persist/worsen. Advised patient to call with any new medication issues, and read all Rx info from pharmacy to assure aware of all possible risks and side effects of medication before taking. Reviewed age and gender appropriate health screening exams and vaccinations. Advised patient regarding importance of keeping up with recommended health maintenance and to schedule as soon as possible if overdue, as this is important in assessing for undiagnosed pathology, especially cancer, as well as protecting against potentially harmful/life threatening disease. Patient and/or guardian verbalizes understanding and agrees with above counseling, assessment and plan. All questions answered. Signs and symptoms to watch for discussed, serious signs and symptoms reviewed. ER if any. Yvette Almodovar MD    Patients are advised to check with insurance company to ensure coverage and to fully understand benefits and cost prior to any testing. This note was created with the assistance of voice recognition software. Document was reviewed however may contain grammatical errors.

## 2022-10-27 DIAGNOSIS — E55.9 VITAMIN D DEFICIENCY: ICD-10-CM

## 2022-10-27 DIAGNOSIS — E78.2 MIXED HYPERLIPIDEMIA: ICD-10-CM

## 2022-10-27 LAB
ALBUMIN SERPL-MCNC: 4.1 G/DL (ref 3.5–5.2)
ALP BLD-CCNC: 55 U/L (ref 40–129)
ALT SERPL-CCNC: 20 U/L (ref 0–40)
ANION GAP SERPL CALCULATED.3IONS-SCNC: 10 MMOL/L (ref 7–16)
AST SERPL-CCNC: 18 U/L (ref 0–39)
BILIRUB SERPL-MCNC: 0.3 MG/DL (ref 0–1.2)
BUN BLDV-MCNC: 24 MG/DL (ref 6–23)
CALCIUM SERPL-MCNC: 9.3 MG/DL (ref 8.6–10.2)
CHLORIDE BLD-SCNC: 105 MMOL/L (ref 98–107)
CHOLESTEROL, TOTAL: 176 MG/DL (ref 0–199)
CO2: 24 MMOL/L (ref 22–29)
CREAT SERPL-MCNC: 1 MG/DL (ref 0.7–1.2)
GFR SERPL CREATININE-BSD FRML MDRD: >60 ML/MIN/1.73
GLUCOSE BLD-MCNC: 110 MG/DL (ref 74–99)
HDLC SERPL-MCNC: 47 MG/DL
LDL CHOLESTEROL CALCULATED: 117 MG/DL (ref 0–99)
POTASSIUM SERPL-SCNC: 4.3 MMOL/L (ref 3.5–5)
SODIUM BLD-SCNC: 139 MMOL/L (ref 132–146)
TOTAL CK: 79 U/L (ref 20–200)
TOTAL PROTEIN: 6.9 G/DL (ref 6.4–8.3)
TRIGL SERPL-MCNC: 61 MG/DL (ref 0–149)
VITAMIN D 25-HYDROXY: 26 NG/ML (ref 30–100)
VLDLC SERPL CALC-MCNC: 12 MG/DL

## 2022-10-31 ENCOUNTER — TELEMEDICINE (OUTPATIENT)
Dept: PRIMARY CARE CLINIC | Age: 62
End: 2022-10-31
Payer: COMMERCIAL

## 2022-10-31 DIAGNOSIS — E53.8 VITAMIN B12 DEFICIENCY: ICD-10-CM

## 2022-10-31 DIAGNOSIS — F41.9 ANXIETY AND DEPRESSION: ICD-10-CM

## 2022-10-31 DIAGNOSIS — M51.36 DDD (DEGENERATIVE DISC DISEASE), LUMBAR: ICD-10-CM

## 2022-10-31 DIAGNOSIS — N40.1 BENIGN PROSTATIC HYPERPLASIA WITH NOCTURIA: ICD-10-CM

## 2022-10-31 DIAGNOSIS — G47.33 OSA (OBSTRUCTIVE SLEEP APNEA): ICD-10-CM

## 2022-10-31 DIAGNOSIS — E78.2 MIXED HYPERLIPIDEMIA: Primary | ICD-10-CM

## 2022-10-31 DIAGNOSIS — E55.9 VITAMIN D DEFICIENCY: ICD-10-CM

## 2022-10-31 DIAGNOSIS — F32.A ANXIETY AND DEPRESSION: ICD-10-CM

## 2022-10-31 DIAGNOSIS — K21.9 GASTROESOPHAGEAL REFLUX DISEASE, UNSPECIFIED WHETHER ESOPHAGITIS PRESENT: ICD-10-CM

## 2022-10-31 DIAGNOSIS — Z00.00 HEALTH MAINTENANCE EXAMINATION: ICD-10-CM

## 2022-10-31 DIAGNOSIS — N42.9 PROSTATE DISORDER: ICD-10-CM

## 2022-10-31 DIAGNOSIS — R73.9 HYPERGLYCEMIA: ICD-10-CM

## 2022-10-31 DIAGNOSIS — R35.1 BENIGN PROSTATIC HYPERPLASIA WITH NOCTURIA: ICD-10-CM

## 2022-10-31 PROCEDURE — 99214 OFFICE O/P EST MOD 30 MIN: CPT | Performed by: FAMILY MEDICINE

## 2022-10-31 RX ORDER — TAMSULOSIN HYDROCHLORIDE 0.4 MG/1
0.4 CAPSULE ORAL DAILY
Qty: 30 CAPSULE | Refills: 2 | Status: SHIPPED | OUTPATIENT
Start: 2022-10-31

## 2022-10-31 RX ORDER — ATORVASTATIN CALCIUM 10 MG/1
10 TABLET, FILM COATED ORAL DAILY
Qty: 30 TABLET | Refills: 1 | Status: SHIPPED | OUTPATIENT
Start: 2022-10-31

## 2022-10-31 NOTE — PROGRESS NOTES
TeleMedicine Patient Consent    This visit was performed as a virtual video visit using a synchronous, two-way, audio-video telehealth technology platform. Patient identification was verified at the start of the visit, including the patient's telephone number and physical location. I discussed with the patient the nature of our telehealth visits, that:     Due to the nature of an audio- video modality, the only components of a physical exam that could be done are the elements supported by direct observation. I would evaluate the patient and recommend diagnostics and treatments based on my assessment. If it was felt that the patient should be evaluated in clinic or an emergency room setting, then they would be directed there. Our sessions are not being recorded and that personal health information is protected. Our team would provide follow up care in person if/when the patient needs it. Patient does agree to proceed with telemedicine consultation. Patient's location: home address in [facility]    Physician  [place]     Other people involved in call:   None    This visit was completed virtually using Doxy. me    10/31/2022    TELEHEALTH EVALUATION -- Audio/Visual (During RLVJB-36 public health emergency)    Chief Complaint   Patient presents with    Discuss Labs    Medication Check     Crestor stopped last week            HPI:    Keith Marrero (:  1960) has requested an audio/video evaluation for the following concern(s):    Patient presents today for multiple. He stopped the Crestor as it was causing significant myalgias of his legs. This resolved. He does have chronic left low back pain with radiation in the left buttock down the left leg. He did see Dr. Raz Iglesias who felt it was sciatica. He is asking for an x-ray. No numbness tingling or weakness    Chronic nocturia for years, been following Dr. Yennifer Osman. States continued symptoms.   He will follow-up we did discuss risk benefits of Flomax and would like to try    Moderate NEIL on sleep study by allergist, he is trying to lose weight, said he qualified for CPAP or oral device, declines either, risk of untreated NEIL reviewed.   No other complaints or concerns at this time    The 10-year ASCVD risk score (Thelma FLORES, et al., 2019) is: 10.6%    Values used to calculate the score:      Age: 57 years      Sex: Male      Is Non- : No      Diabetic: No      Tobacco smoker: No      Systolic Blood Pressure: 398 mmHg      Is BP treated: No      HDL Cholesterol: 47 mg/dL      Total Cholesterol: 176 mg/dL    Labs reviewed BUN 24 glucose 110, CK 79 HDL 47  triglycerides 61 vitamin D 26, appropriate supplementation reviewed      Most Recent Labs  CBC  Lab Results   Component Value Date/Time    WBC 6.0 09/20/2022 07:08 AM    WBC 5.0 02/11/2022 08:03 AM    WBC 4.9 08/10/2021 08:02 AM    RBC 4.50 09/20/2022 07:08 AM    RBC 4.60 02/11/2022 08:03 AM    RBC 4.58 08/10/2021 08:02 AM    HGB 13.5 09/20/2022 07:08 AM    HGB 13.6 02/11/2022 08:03 AM    HGB 13.6 08/10/2021 08:02 AM    HCT 42.5 09/20/2022 07:08 AM    HCT 42.4 02/11/2022 08:03 AM    HCT 42.4 08/10/2021 08:02 AM    MCV 94.4 09/20/2022 07:08 AM    MCV 92.2 02/11/2022 08:03 AM    MCV 92.6 08/10/2021 08:02 AM     09/20/2022 07:08 AM     02/11/2022 08:03 AM     08/10/2021 08:02 AM      CMP  Lab Results   Component Value Date/Time     10/27/2022 09:36 AM     09/20/2022 07:08 AM     03/15/2022 07:45 AM    K 4.3 10/27/2022 09:36 AM    K 4.4 09/20/2022 07:08 AM    K 4.6 03/15/2022 07:45 AM     10/27/2022 09:36 AM     09/20/2022 07:08 AM     03/15/2022 07:45 AM    CO2 24 10/27/2022 09:36 AM    CO2 21 09/20/2022 07:08 AM    CO2 24 03/15/2022 07:45 AM    ANIONGAP 10 10/27/2022 09:36 AM    ANIONGAP 15 09/20/2022 07:08 AM    ANIONGAP 10 03/15/2022 07:45 AM    GLUCOSE 110 10/27/2022 09:36 AM    GLUCOSE 113 09/20/2022 07:08 AM    GLUCOSE 105 03/15/2022 07:45 AM    BUN 24 10/27/2022 09:36 AM    BUN 22 09/20/2022 07:08 AM    BUN 25 03/15/2022 07:45 AM    CREATININE 1.0 10/27/2022 09:36 AM    CREATININE 0.9 09/20/2022 07:08 AM    CREATININE 0.9 03/15/2022 07:45 AM    CREATININE 1.0 01/17/2019 12:00 AM    LABGLOM >60 10/27/2022 09:36 AM    LABGLOM >60 09/20/2022 07:08 AM    LABGLOM >60 03/15/2022 07:45 AM    LABGLOM >60 02/11/2022 08:03 AM    GFRAA >60 09/20/2022 07:08 AM    GFRAA >60 03/15/2022 07:45 AM    GFRAA >60 02/11/2022 08:03 AM    CALCIUM 9.3 10/27/2022 09:36 AM    CALCIUM 9.1 09/20/2022 07:08 AM    CALCIUM 9.0 03/15/2022 07:45 AM    PROT 6.9 10/27/2022 09:36 AM    PROT 6.6 09/20/2022 07:08 AM    PROT 6.6 03/15/2022 07:45 AM    LABALBU 4.1 10/27/2022 09:36 AM    LABALBU 3.9 09/20/2022 07:08 AM    LABALBU 3.9 03/15/2022 07:45 AM    BILITOT 0.3 10/27/2022 09:36 AM    BILITOT 0.4 09/20/2022 07:08 AM    BILITOT 0.3 03/15/2022 07:45 AM    ALKPHOS 55 10/27/2022 09:36 AM    ALKPHOS 63 09/20/2022 07:08 AM    ALKPHOS 56 03/15/2022 07:45 AM    AST 18 10/27/2022 09:36 AM    AST 23 09/20/2022 07:08 AM    AST 18 03/15/2022 07:45 AM    ALT 20 10/27/2022 09:36 AM    ALT 19 09/20/2022 07:08 AM    ALT 16 03/15/2022 07:45 AM     A1C  Lab Results   Component Value Date/Time    LABA1C 5.8 09/20/2022 07:08 AM    LABA1C 5.9 02/11/2022 08:03 AM    LABA1C 5.7 07/08/2021 08:18 AM     TSH  Lab Results   Component Value Date/Time    TSH 2.100 09/20/2022 07:08 AM    TSH 1.340 02/11/2022 08:03 AM    TSH 1.280 08/10/2021 08:02 AM     FREET4  No results found for: [id number]  LIPID  Lab Results   Component Value Date/Time    CHOL 176 10/27/2022 09:36 AM    CHOL 174 09/20/2022 07:08 AM    CHOL 168 03/15/2022 07:45 AM    HDL 47 10/27/2022 09:36 AM    HDL 45 09/20/2022 07:08 AM    HDL 38 03/15/2022 07:45 AM    LDLCALC 117 10/27/2022 09:36 AM    LDLCALC 114 09/20/2022 07:08 AM    LDLCALC 116 03/15/2022 07:45 AM    TRIG 61 10/27/2022 09:36 AM    TRIG 73 09/20/2022 07:08 AM    TRIG 71 03/15/2022 07:45 AM    CHOLHDLRATIO 4.4 01/17/2019 12:00 AM     VITAMIN D  Lab Results   Component Value Date/Time    VITD25 26 10/27/2022 09:36 AM    VITD25 29 09/20/2022 07:08 AM    VITD25 22 02/11/2022 08:03 AM     MAGNESIUM  No results found for: MG   PHOS  No results found for: PHOS   ASHU   No results found for: ASHU  RHEUMATOID FACTOR  No results found for: RF  PSA  Lab Results   Component Value Date/Time    PSA 3.12 09/20/2022 07:08 AM    PSA 2.97 02/11/2022 08:03 AM    PSA 4.62 08/10/2021 08:02 AM      HEPATITIS C  Lab Results   Component Value Date/Time    HCVABI Non-Reactive 03/15/2022 07:45 AM     HIV  No results found for: [id number], HIV1QT  UA  Lab Results   Component Value Date/Time    COLORU Yellow 09/20/2022 07:07 AM    COLORU Yellow 02/11/2022 08:03 AM    COLORU Yellow 08/10/2021 08:03 AM    CLARITYU Clear 09/20/2022 07:07 AM    CLARITYU Clear 02/11/2022 08:03 AM    CLARITYU Clear 08/10/2021 08:03 AM    GLUCOSEU Negative 09/20/2022 07:07 AM    GLUCOSEU Negative 02/11/2022 08:03 AM    GLUCOSEU Negative 08/10/2021 08:03 AM    BILIRUBINUR Negative 09/20/2022 07:07 AM    BILIRUBINUR Negative 02/11/2022 08:03 AM    BILIRUBINUR Negative 08/10/2021 08:03 AM    KETUA Negative 09/20/2022 07:07 AM    KETUA Negative 02/11/2022 08:03 AM    KETUA Negative 08/10/2021 08:03 AM    SPECGRAV 1.020 09/20/2022 07:07 AM    SPECGRAV 1.020 02/11/2022 08:03 AM    SPECGRAV 1.020 08/10/2021 08:03 AM    BLOODU Negative 09/20/2022 07:07 AM    BLOODU Negative 02/11/2022 08:03 AM    BLOODU Negative 08/10/2021 08:03 AM    PHUR 6.0 09/20/2022 07:07 AM    PHUR 6.5 02/11/2022 08:03 AM    PHUR 6.5 08/10/2021 08:03 AM    PROTEINU Negative 09/20/2022 07:07 AM    PROTEINU Negative 02/11/2022 08:03 AM    PROTEINU Negative 08/10/2021 08:03 AM    UROBILINOGEN 0.2 09/20/2022 07:07 AM    UROBILINOGEN 0.2 02/11/2022 08:03 AM    UROBILINOGEN 1.0 08/10/2021 08:03 AM    NITRU Negative 09/20/2022 07:07 AM    NITRU Negative 02/11/2022 08:03 AM    NITRU Negative 08/10/2021 08:03 AM    LEUKOCYTESUR Negative 09/20/2022 07:07 AM    LEUKOCYTESUR TRACE 02/11/2022 08:03 AM    LEUKOCYTESUR Negative 08/10/2021 08:03 AM     Urine Micro/Albumin Ratio  Lab Results   Component Value Date/Time    BERTA - 09/20/2022 07:07 AM       ROS:  Const: Denies chills, fever, malaise and sweats. Eyes: Denies discharge, pain, redness and visual disturbance. ENMT: Denies earaches, other ear symptoms. Denies nasal or sinus symptoms other than stated  above. Denies mouth and tongue lesions and sore throat. CV: Denies chest discomfort, pain; diaphoresis, dizziness, edema, lightheadedness, orthopnea,  palpitations, syncope and near syncopal episode or any exertional symptoms  Resp: Denies cough, hemoptysis, pleuritic pain, SOB, sputum production and wheezing. GI: Denies abdominal pain, change in bowel habits, hematochezia, melena, nausea and vomiting. : Denies urinary symptoms including dysuria , urgency, frequency or hematuria other than chronic nocturia. Musculo: Denies musculoskeletal symptoms. Skin: Denies bruising and rash.   Neuro: Denies headache, numbness, stiff neck, tingling and focal weakness slurred speech or facial  droop  Hema/Lymph: Denies bleeding/bruising tendency and enlarged lymph nodes         Current Outpatient Medications:     tamsulosin (FLOMAX) 0.4 MG capsule, Take 1 capsule by mouth daily, Disp: 30 capsule, Rfl: 2    atorvastatin (LIPITOR) 10 MG tablet, Take 1 tablet by mouth daily, Disp: 30 tablet, Rfl: 1    omeprazole (PRILOSEC) 20 MG delayed release capsule, Take 1 capsule by mouth daily, Disp: 30 capsule, Rfl: 12    DULoxetine (CYMBALTA) 60 MG extended release capsule, Take 1 capsule by mouth daily, Disp: 30 capsule, Rfl: 12    diclofenac (VOLTAREN) 50 MG EC tablet, Take 1 tablet by mouth 2 times daily (with meals), Disp: 60 tablet, Rfl: 1    tadalafil (CIALIS) 5 MG tablet, Take 5 mg by mouth daily, Disp: , Rfl:     rosuvastatin (CRESTOR) 10 MG tablet, Take 1 tablet by mouth Daily with supper (Patient not taking: Reported on 10/31/2022), Disp: 30 tablet, Rfl: 1    rOPINIRole (REQUIP) 0.25 MG tablet, Take 1 tablet by mouth nightly (Patient not taking: No sig reported), Disp: 90 tablet, Rfl: 3  Allergies   Allergen Reactions    Prednisone Anxiety       Past Medical History:   Diagnosis Date    Disorder of prostate     Dysthymic disorder     GERD (gastroesophageal reflux disease)     Migraine     Tinea unguium     Vitamin D deficiency      Past Surgical History:   Procedure Laterality Date    CHOLECYSTECTOMY, LAPAROSCOPIC      HERNIA REPAIR       No family history on file. Social History     Tobacco Use    Smoking status: Never    Smokeless tobacco: Never   Vaping Use    Vaping Use: Never used   Substance Use Topics    Alcohol use: No    Drug use: No     Social History     Social History Narrative    PMH:    Health Maintenance:    Influenza Vaccination - (2017)    Dr Rae Mobley - nl exam     Medical Problems:    Ulcers - Karen Terrell. Has had EGD  (3 ulcers, erosion of stomach). repeat . repeat     Last colonoscopy , ,; nl. had , nl. GERD    migraines - follows with Dr Adrian Dewitt; typically around allergy season. started age 25. cervical DDD    overactive bladder - Dr Magaly Ocasio spur - Dr Maureen Davis; surgery    Had measles/chicken pox    [facility]    Surgical Hx:    Glenny    Hernia repair - Rt ;  [facility] [place]    Vasectomy, T & A    Reviewed, no changes. FH:    Father:    Non Contributory. Mother:    Non Contributory. Brother 1 yr younger chron's    Dad - CHF  71 heavy smoker    Mom - healthy    Reviewed, no changes. SH:    Marital:  - x2, getting . limited caffeine. own [place]    Personal Habits: Cigarette Use: Does Not Smoke - occassional cigar. Alcohol: Drinks Alcoholic    Beverages Rarely.                  PHYSICAL EXAMINATION:  [ INSTRUCTIONS:  \"[x]\" Indicates a positive item \"[]\" Indicates a negative item  -- DELETE ALL ITEMS NOT EXAMINED]  Vital Signs: (As obtained by patient/caregiver or practitioner observation)    There were no vitals filed for this visit. Blood pressure-  Heart rate-    Respiratory rate-    Temperature-  Pulse oximetry-     Constitutional: [x] Appears well-developed and well-nourished [x] No apparent distress      [] Abnormal-   Mental status  [x] Alert and awake  [x] Oriented to person/place/time [x]Able to follow commands      Eyes:  EOM    [x]  Normal  [] Abnormal-  Sclera  [x]  Normal  [] Abnormal -         Discharge [x]  None visible  [] Abnormal -    HENT:   [x] Normocephalic, atraumatic. [] Abnormal   [x] Mouth/Throat: Mucous membranes are moist.     External Ears [x] Normal  [] Abnormal-     Neck: [x] No visualized mass     Pulmonary/Chest: [x] Respiratory effort normal.  [x] No visualized signs of difficulty breathing or respiratory distress        [] Abnormal-      Musculoskeletal:   [x] Normal gait with no signs of ataxia         [x] Normal range of motion of neck        [] Abnormal-       Neurological:        [x] No Facial Asymmetry (Cranial nerve 7 motor function) (limited exam to video visit)          [x] No gaze palsy        [] Abnormal-         Skin:        [x] No significant exanthematous lesions or discoloration noted on facial skin         [] Abnormal-            Psychiatric:       [x] Normal Affect [x] No Hallucinations        [] Abnormal-     Other pertinent observable physical exam findings-     ASSESSMENT/PLAN:   Diagnosis Orders   1. Mixed hyperlipidemia  Lipid Panel    Comprehensive Metabolic Panel    CK    atorvastatin (LIPITOR) 10 MG tablet      2. DDD (degenerative disc disease), lumbar  XR LUMBAR SPINE (2-3 VIEWS)    Amb External Referral To Chiropractic      3. Gastroesophageal reflux disease, unspecified whether esophagitis present        4. Anxiety and depression        5. Prostate disorder        6. Hyperglycemia        7. Vitamin D deficiency        8. Vitamin B12 deficiency        9. Health maintenance examination        10. NEIL (obstructive sleep apnea)        11. Benign prostatic hyperplasia with nocturia  tamsulosin (FLOMAX) 0.4 MG capsule          No problem-specific Assessment & Plan notes found for this encounter. Class 2 obesity due to excess calories without serious comorbidity with body mass index (BMI) of 37.0 to 37.9 in adult    lifestyle modification emphasized. Defers formal intervention. Risk of obesity reviewed. Denies NEIL symptoms. Counseled on Mediterranean diet and weight watchers. Vitamin B12 deficiency    history of, continue B12 sublingual 1000 mcg daily, levels normal    Plantar fasciitis, left    counseled, continue per Dr. Estella Barr, has had injection in the past      Chronic pain of both knees    Dr. Estella Barr referred to Dr. Juliann Santana. Counseled on use of Tylenol, topicals, heat/ice, range of motion    Allergic rhinitis due to allergen    stable with injections    Vertigo    counseled, differential reviewed, thoroughly investigated and worked up by Dr. Palma Morrissey decades ago, has been stable for decades. Intolerant to meclizine because of sedation. It is only with certain position particularly lying doing overhead activities such as working on a car/under a sink. Declines further evaluation/treatment at this time     Health maintenance examination  Health maintenance issues discussed at length  8/16/2021. Encouraged yearly physicals. Metabolic syndrome  Counseled extensively. Lifestyle modification reviewed. Importance of diet exercise and weight loss reviewed. Micro-macrovascular complications reviewed. Stable. He was intolerant to Metformin XR and we will monitor at this time, defers other pharmacotherapy at this time. Monitor. Hyperglycemia  Counseled. See comments under metabolic syndrome.         Gastroesophageal reflux disease without esophagitis  Patient completely asymptomatic, followed with Dr. Jayashree Stanley. Omeprazole 20 mg daily, previously 40 mg with risks benefits reviewed including masking underlying etiologies, risk of calcium else works and renal insufficiency, C. diff and otherwise. Had endoscopy around 2018 appropriate timing of dosing of PPI reviewed. Also notices benefit on lactose-free. Discussed if symptom-free he may wean and stop this notify us if any recurrence. He has noticed symptoms without medicine, and if he knows he is going to eat poorly he will take an extra omeprazole with good control. Refilled. Dysthymia  Stable and asymptomatic on medication. Tolerating well. Risk benefits and ADRs reviewed. Appropriate counseling reviewed. Adamantly denies SI/HI. Appropriate timing of dosing of Cymbalta reviewed. He did temporarily reduce to 30 mg daily but had breakthrough so back on 60 mg doing well      Disorder of prostate  Chronic nocturia. Following with Dr. Conrad Gusman,, had MRI that was negative he states, he should follow-up and says he will. He would like to try Flomax meantime with standard precaution including interactions sedation hypotension dry mouth etc.  Discontinue if any ADRs    Vitamin D deficiency  Appropriate supplementation reviewed, encourage vitamin D3 2000 IUs daily. He does miss doses, compliance reviewed     Mixed hyperlipidemia  Elevated ASCVD. Counseled. Did not tolerate Crestor, myalgias, thinks he did not tolerate it in the past either. Willing to try low-dose Lipitor 10 mg with standard precautions interactions ADRs reviewed discontinue if any. Counseled on lifestyle modification risk benefits of statin risk of hyperlipidemia counseled on vitamin D and co-Q10. Blood work 1 month follow-up 5 weeks      DDD LS spine  Counseled extensively. Differential reviewed, including serious etiologies. Refer to Dr. Sami Walter, counseled on heat/ice/topicals.   Check x-ray    NEIL  Sleep study ordered by allergist, moderate NEIL he states, he is trying to lose weight, states he qualified for CPAP or oral device but declined now. Risk of untreated NEIL reviewed. Standard precautions                Plan as above. Counseled extensively and differential diagnoses relevant to above were reviewed, including serious etiologies, risks and complications, especially of left uncontrolled. If relevant, instructions and  alternatives to meds/treatment reviewed, as well as interactions, and  SE's/ADRs reviewed, notify immediately if any, discontinuing new meds if any. Plan made after discussion and shared decision making. In brief summary, start Lipitor, Flomax, refer to Dr. Villalta Semen, check x-ray, blood work 1 month follow-up 5 weeks sooner as needed. Continue per specialists    As long as symptoms steadily improve/resolve and medical conditions are following the expected course, FU as below, sooner PRN      Return in about 5 weeks (around 12/5/2022), or if symptoms worsen or fail to improve. Educational materials and/or home exercises printed for patient's review and were included in patient instructions on his/her After Visit Summary and given to patient at the end of visit. After discussion, patient and/or guardian verbalizes understanding, agrees, feels comfortable with and wishes to proceed with above treatment plan. Call for any pending results, FU sooner if abnormal, as needed or if any current symptoms persist/worsen. Advised patient to call with any new medication issues, and read all Rx info from pharmacy to assure aware of all possible risks and side effects of medication before taking. Reviewed age and gender appropriate health screening exams and vaccinations.   Advised patient regarding importance of keeping up with recommended health maintenance and to schedule as soon as possible if overdue, as this is important in assessing for undiagnosed pathology, especially cancer, as well as protecting against potentially harmful/life threatening disease. Patient and/or guardian verbalizes understanding and agrees with above counseling, assessment and plan. All questions answered. Signs and symptoms to watch for discussed, serious signs and symptoms reviewed. ER if any. Time spent: Greater than Not billed by jennifer        Mark Dawson, was evaluated through a synchronous (real-time) audio-video encounter. The patient (or guardian if applicable) is aware that this is a billable service, which includes applicable co-pays. This Virtual Visit was conducted with patient's (and/or legal guardian's) consent. The visit was conducted pursuant to the emergency declaration under the [address] [place] [address], [address] [place] [address] authority and the [employer] and [employer] General Act. Patient identification was verified, and a caregiver was present when appropriate. The patient was located in a state where the provider was licensed to provide care. The patient (and/or legal guardian) has also been advised to contact this office for worsening conditions or problems, and seek emergency medical treatment and/or call 911 if deemed necessary. As this was a virtual encounter, patient (and /or legal guardian) was instructed on various ways to be seen in person. Patients are advised to check with insurance company to ensure coverage and to fully understand benefits and cost prior to any testing to try to avoid unexpected charges. This note was created with the assistance of voice recognition software. Inadvertent errors may be present. Signs and symptoms to watch for were discussed. Serious signs and symptoms reviewed. ER if any    --Will August MD on 10/31/2022 at 12:13 PM    An electronic signature was used to authenticate this note.

## 2023-01-06 DIAGNOSIS — E78.2 MIXED HYPERLIPIDEMIA: ICD-10-CM

## 2023-01-06 LAB
ALBUMIN SERPL-MCNC: 4 G/DL (ref 3.5–5.2)
ALP BLD-CCNC: 56 U/L (ref 40–129)
ALT SERPL-CCNC: 17 U/L (ref 0–40)
ANION GAP SERPL CALCULATED.3IONS-SCNC: 12 MMOL/L (ref 7–16)
AST SERPL-CCNC: 19 U/L (ref 0–39)
BILIRUB SERPL-MCNC: 0.4 MG/DL (ref 0–1.2)
BUN BLDV-MCNC: 14 MG/DL (ref 6–23)
CALCIUM SERPL-MCNC: 9.2 MG/DL (ref 8.6–10.2)
CHLORIDE BLD-SCNC: 106 MMOL/L (ref 98–107)
CHOLESTEROL, TOTAL: 170 MG/DL (ref 0–199)
CO2: 23 MMOL/L (ref 22–29)
CREAT SERPL-MCNC: 0.9 MG/DL (ref 0.7–1.2)
GFR SERPL CREATININE-BSD FRML MDRD: >60 ML/MIN/1.73
GLUCOSE BLD-MCNC: 109 MG/DL (ref 74–99)
HDLC SERPL-MCNC: 41 MG/DL
LDL CHOLESTEROL CALCULATED: 115 MG/DL (ref 0–99)
POTASSIUM SERPL-SCNC: 4 MMOL/L (ref 3.5–5)
SODIUM BLD-SCNC: 141 MMOL/L (ref 132–146)
TOTAL CK: 126 U/L (ref 20–200)
TOTAL PROTEIN: 6.4 G/DL (ref 6.4–8.3)
TRIGL SERPL-MCNC: 70 MG/DL (ref 0–149)
VLDLC SERPL CALC-MCNC: 14 MG/DL

## 2023-01-09 ENCOUNTER — OFFICE VISIT (OUTPATIENT)
Dept: PRIMARY CARE CLINIC | Age: 63
End: 2023-01-09
Payer: COMMERCIAL

## 2023-01-09 VITALS
BODY MASS INDEX: 37.07 KG/M2 | DIASTOLIC BLOOD PRESSURE: 68 MMHG | HEART RATE: 68 BPM | SYSTOLIC BLOOD PRESSURE: 132 MMHG | WEIGHT: 251 LBS | TEMPERATURE: 97.6 F | OXYGEN SATURATION: 97 %

## 2023-01-09 DIAGNOSIS — I83.813 VARICOSE VEINS OF BOTH LOWER EXTREMITIES WITH PAIN: ICD-10-CM

## 2023-01-09 DIAGNOSIS — M54.32 BILATERAL SCIATICA: ICD-10-CM

## 2023-01-09 DIAGNOSIS — M51.36 DDD (DEGENERATIVE DISC DISEASE), LUMBAR: Primary | ICD-10-CM

## 2023-01-09 DIAGNOSIS — R73.9 HYPERGLYCEMIA: ICD-10-CM

## 2023-01-09 DIAGNOSIS — E53.8 VITAMIN B12 DEFICIENCY: ICD-10-CM

## 2023-01-09 DIAGNOSIS — E88.81 METABOLIC SYNDROME: ICD-10-CM

## 2023-01-09 DIAGNOSIS — N42.9 PROSTATE DISORDER: ICD-10-CM

## 2023-01-09 DIAGNOSIS — K21.9 GASTROESOPHAGEAL REFLUX DISEASE, UNSPECIFIED WHETHER ESOPHAGITIS PRESENT: ICD-10-CM

## 2023-01-09 DIAGNOSIS — E55.9 VITAMIN D DEFICIENCY: ICD-10-CM

## 2023-01-09 DIAGNOSIS — M54.31 BILATERAL SCIATICA: ICD-10-CM

## 2023-01-09 PROCEDURE — 99214 OFFICE O/P EST MOD 30 MIN: CPT | Performed by: FAMILY MEDICINE

## 2023-01-09 RX ORDER — METFORMIN HYDROCHLORIDE 500 MG/1
500 TABLET, EXTENDED RELEASE ORAL
Qty: 30 TABLET | Refills: 1 | Status: SHIPPED | OUTPATIENT
Start: 2023-01-09

## 2023-01-09 ASSESSMENT — PATIENT HEALTH QUESTIONNAIRE - PHQ9
SUM OF ALL RESPONSES TO PHQ QUESTIONS 1-9: 0
SUM OF ALL RESPONSES TO PHQ QUESTIONS 1-9: 0
7. TROUBLE CONCENTRATING ON THINGS, SUCH AS READING THE NEWSPAPER OR WATCHING TELEVISION: 0
SUM OF ALL RESPONSES TO PHQ QUESTIONS 1-9: 0
8. MOVING OR SPEAKING SO SLOWLY THAT OTHER PEOPLE COULD HAVE NOTICED. OR THE OPPOSITE, BEING SO FIGETY OR RESTLESS THAT YOU HAVE BEEN MOVING AROUND A LOT MORE THAN USUAL: 0
10. IF YOU CHECKED OFF ANY PROBLEMS, HOW DIFFICULT HAVE THESE PROBLEMS MADE IT FOR YOU TO DO YOUR WORK, TAKE CARE OF THINGS AT HOME, OR GET ALONG WITH OTHER PEOPLE: 0
2. FEELING DOWN, DEPRESSED OR HOPELESS: 0
6. FEELING BAD ABOUT YOURSELF - OR THAT YOU ARE A FAILURE OR HAVE LET YOURSELF OR YOUR FAMILY DOWN: 0
9. THOUGHTS THAT YOU WOULD BE BETTER OFF DEAD, OR OF HURTING YOURSELF: 0
5. POOR APPETITE OR OVEREATING: 0
1. LITTLE INTEREST OR PLEASURE IN DOING THINGS: 0
4. FEELING TIRED OR HAVING LITTLE ENERGY: 0
3. TROUBLE FALLING OR STAYING ASLEEP: 0
SUM OF ALL RESPONSES TO PHQ QUESTIONS 1-9: 0
SUM OF ALL RESPONSES TO PHQ9 QUESTIONS 1 & 2: 0

## 2023-01-09 NOTE — PROGRESS NOTES
Temitope Denney : 1960 Sex: male  Age: 57 y.o. Chief Complaint   Patient presents with    Leg Pain     L > R pain x months     Discuss Labs       HPI:     Patient Marc feeling that her sciatica is acting up. States over a month ago he was developing pain down both legs with paresthesia, worse with position of his back. He does have chronic varicose veins. Right leg is greatly improved but still get intermittent symptoms on the left leg. Nonexertional.  More positional.  He states he stopped his Lipitor shortly after last visit which was 10/31/2022 does not wish another statin. Interested in started metformin again. Previously sent to Dr. Bre Cruz, did not go.   At one point pain was significant in the posterior calf with chronic varicosities but this subsided greatly      Blood work reviewed BMP shows glucose 109 otherwise normal CK under 26 HDL 41  triglyceride 70    The 10-year ASCVD risk score (Thelma FLORES, et al., 2019) is: 12.4%    Values used to calculate the score:      Age: 57 years      Sex: Male      Is Non- : No      Diabetic: No      Tobacco smoker: No      Systolic Blood Pressure: 431 mmHg      Is BP treated: No      HDL Cholesterol: 41 mg/dL      Total Cholesterol: 170 mg/dL        Most Recent Labs  CBC  Lab Results   Component Value Date/Time    WBC 6.0 2022 07:08 AM    WBC 5.0 2022 08:03 AM    WBC 4.9 08/10/2021 08:02 AM    RBC 4.50 2022 07:08 AM    RBC 4.60 2022 08:03 AM    RBC 4.58 08/10/2021 08:02 AM    HGB 13.5 2022 07:08 AM    HGB 13.6 2022 08:03 AM    HGB 13.6 08/10/2021 08:02 AM    HCT 42.5 2022 07:08 AM    HCT 42.4 2022 08:03 AM    HCT 42.4 08/10/2021 08:02 AM    MCV 94.4 2022 07:08 AM    MCV 92.2 2022 08:03 AM    MCV 92.6 08/10/2021 08:02 AM     2022 07:08 AM     2022 08:03 AM     08/10/2021 08:02 AM      CMP  Lab Results   Component Value Date/Time     01/06/2023 07:04 AM     10/27/2022 09:36 AM     09/20/2022 07:08 AM    K 4.0 01/06/2023 07:04 AM    K 4.3 10/27/2022 09:36 AM    K 4.4 09/20/2022 07:08 AM     01/06/2023 07:04 AM     10/27/2022 09:36 AM     09/20/2022 07:08 AM    CO2 23 01/06/2023 07:04 AM    CO2 24 10/27/2022 09:36 AM    CO2 21 09/20/2022 07:08 AM    ANIONGAP 12 01/06/2023 07:04 AM    ANIONGAP 10 10/27/2022 09:36 AM    ANIONGAP 15 09/20/2022 07:08 AM    GLUCOSE 109 01/06/2023 07:04 AM    GLUCOSE 110 10/27/2022 09:36 AM    GLUCOSE 113 09/20/2022 07:08 AM    BUN 14 01/06/2023 07:04 AM    BUN 24 10/27/2022 09:36 AM    BUN 22 09/20/2022 07:08 AM    CREATININE 0.9 01/06/2023 07:04 AM    CREATININE 1.0 10/27/2022 09:36 AM    CREATININE 0.9 09/20/2022 07:08 AM    CREATININE 1.0 01/17/2019 12:00 AM    LABGLOM >60 01/06/2023 07:04 AM    LABGLOM >60 10/27/2022 09:36 AM    LABGLOM >60 09/20/2022 07:08 AM    LABGLOM >60 03/15/2022 07:45 AM    LABGLOM >60 02/11/2022 08:03 AM    GFRAA >60 09/20/2022 07:08 AM    GFRAA >60 03/15/2022 07:45 AM    GFRAA >60 02/11/2022 08:03 AM    CALCIUM 9.2 01/06/2023 07:04 AM    CALCIUM 9.3 10/27/2022 09:36 AM    CALCIUM 9.1 09/20/2022 07:08 AM    PROT 6.4 01/06/2023 07:04 AM    PROT 6.9 10/27/2022 09:36 AM    PROT 6.6 09/20/2022 07:08 AM    LABALBU 4.0 01/06/2023 07:04 AM    LABALBU 4.1 10/27/2022 09:36 AM    LABALBU 3.9 09/20/2022 07:08 AM    BILITOT 0.4 01/06/2023 07:04 AM    BILITOT 0.3 10/27/2022 09:36 AM    BILITOT 0.4 09/20/2022 07:08 AM    ALKPHOS 56 01/06/2023 07:04 AM    ALKPHOS 55 10/27/2022 09:36 AM    ALKPHOS 63 09/20/2022 07:08 AM    AST 19 01/06/2023 07:04 AM    AST 18 10/27/2022 09:36 AM    AST 23 09/20/2022 07:08 AM    ALT 17 01/06/2023 07:04 AM    ALT 20 10/27/2022 09:36 AM    ALT 19 09/20/2022 07:08 AM     A1C  Lab Results   Component Value Date/Time    LABA1C 5.8 09/20/2022 07:08 AM    LABA1C 5.9 02/11/2022 08:03 AM    LABA1C 5.7 07/08/2021 08:18 AM     TSH  Lab Results   Component Value Date/Time    TSH 2.100 09/20/2022 07:08 AM    TSH 1.340 02/11/2022 08:03 AM    TSH 1.280 08/10/2021 08:02 AM     FREET4  No results found for: [id number]  LIPID  Lab Results   Component Value Date/Time    CHOL 170 01/06/2023 07:04 AM    CHOL 176 10/27/2022 09:36 AM    CHOL 174 09/20/2022 07:08 AM    HDL 41 01/06/2023 07:04 AM    HDL 47 10/27/2022 09:36 AM    HDL 45 09/20/2022 07:08 AM    LDLCALC 115 01/06/2023 07:04 AM    LDLCALC 117 10/27/2022 09:36 AM    LDLCALC 114 09/20/2022 07:08 AM    TRIG 70 01/06/2023 07:04 AM    TRIG 61 10/27/2022 09:36 AM    TRIG 73 09/20/2022 07:08 AM    CHOLHDLRATIO 4.4 01/17/2019 12:00 AM     VITAMIN D  Lab Results   Component Value Date/Time    VITD25 26 10/27/2022 09:36 AM    VITD25 29 09/20/2022 07:08 AM    VITD25 22 02/11/2022 08:03 AM     MAGNESIUM  No results found for: MG   PHOS  No results found for: PHOS   ASHU   No results found for: ASHU  RHEUMATOID FACTOR  No results found for: RF  PSA  Lab Results   Component Value Date/Time    PSA 3.12 09/20/2022 07:08 AM    PSA 2.97 02/11/2022 08:03 AM    PSA 4.62 08/10/2021 08:02 AM      HEPATITIS C  Lab Results   Component Value Date/Time    HCVABI Non-Reactive 03/15/2022 07:45 AM     HIV  No results found for: [id number], HIV1QT  UA  Lab Results   Component Value Date/Time    COLORU Yellow 09/20/2022 07:07 AM    COLORU Yellow 02/11/2022 08:03 AM    COLORU Yellow 08/10/2021 08:03 AM    CLARITYU Clear 09/20/2022 07:07 AM    CLARITYU Clear 02/11/2022 08:03 AM    CLARITYU Clear 08/10/2021 08:03 AM    GLUCOSEU Negative 09/20/2022 07:07 AM    GLUCOSEU Negative 02/11/2022 08:03 AM    GLUCOSEU Negative 08/10/2021 08:03 AM    BILIRUBINUR Negative 09/20/2022 07:07 AM    BILIRUBINUR Negative 02/11/2022 08:03 AM    BILIRUBINUR Negative 08/10/2021 08:03 AM    KETUA Negative 09/20/2022 07:07 AM    KETUA Negative 02/11/2022 08:03 AM    KETUA Negative 08/10/2021 08:03 AM    SPECGRAV 1.020 09/20/2022 07:07 AM    SPECGRAV 1.020 02/11/2022 08:03 AM    SPECGRAV 1.020 08/10/2021 08:03 AM    BLOODU Negative 09/20/2022 07:07 AM    BLOODU Negative 02/11/2022 08:03 AM    BLOODU Negative 08/10/2021 08:03 AM    PHUR 6.0 09/20/2022 07:07 AM    PHUR 6.5 02/11/2022 08:03 AM    PHUR 6.5 08/10/2021 08:03 AM    PROTEINU Negative 09/20/2022 07:07 AM    PROTEINU Negative 02/11/2022 08:03 AM    PROTEINU Negative 08/10/2021 08:03 AM    UROBILINOGEN 0.2 09/20/2022 07:07 AM    UROBILINOGEN 0.2 02/11/2022 08:03 AM    UROBILINOGEN 1.0 08/10/2021 08:03 AM    NITRU Negative 09/20/2022 07:07 AM    NITRU Negative 02/11/2022 08:03 AM    NITRU Negative 08/10/2021 08:03 AM    LEUKOCYTESUR Negative 09/20/2022 07:07 AM    LEUKOCYTESUR TRACE 02/11/2022 08:03 AM    LEUKOCYTESUR Negative 08/10/2021 08:03 AM     Urine Micro/Albumin Ratio  Lab Results   Component Value Date/Time    MALBCR - 09/20/2022 07:07 AM         Review of Systems  ROS:  Const: Denies chills, fever, malaise and sweats.  Eyes: Denies discharge, pain, redness and visual disturbance.  ENMT: Denies earaches, other ear symptoms. Denies nasal or sinus symptoms other than stated  above. Denies mouth and tongue lesions and sore throat.  CV: Denies chest discomfort, pain; diaphoresis, dizziness, edema, lightheadedness, orthopnea,  palpitations, syncope and near syncopal episode or any exertional symptoms  Resp: Denies cough, hemoptysis, pleuritic pain, SOB, sputum production and wheezing.  GI: Denies abdominal pain, change in bowel habits, hematochezia, melena, nausea and vomiting.  : Denies urinary symptoms including dysuria , urgency, frequency or hematuria.  Musculo: Chronic knee pain, reproducible low back pain intermittent pain down the legs  Skin: Denies bruising and rash.  Neuro: Denies headache, numbness, stiff neck, tingling and focal weakness slurred speech or facial  droop other than as above  Hema/Lymph: Denies bleeding/bruising tendency and enlarged lymph nodes        Current Outpatient  Medications:     metFORMIN (GLUCOPHAGE-XR) 500 MG extended release tablet, Take 1 tablet by mouth daily (with breakfast), Disp: 30 tablet, Rfl: 1    tamsulosin (FLOMAX) 0.4 MG capsule, Take 1 capsule by mouth daily, Disp: 30 capsule, Rfl: 2    omeprazole (PRILOSEC) 20 MG delayed release capsule, Take 1 capsule by mouth daily, Disp: 30 capsule, Rfl: 12    DULoxetine (CYMBALTA) 60 MG extended release capsule, Take 1 capsule by mouth daily, Disp: 30 capsule, Rfl: 12    tadalafil (CIALIS) 5 MG tablet, Take 5 mg by mouth daily, Disp: , Rfl:      Allergies   Allergen Reactions    Prednisone Anxiety       Past Medical History:   Diagnosis Date    Disorder of prostate     Dysthymic disorder     GERD (gastroesophageal reflux disease)     Migraine     Tinea unguium     Vitamin D deficiency      Past Surgical History:   Procedure Laterality Date    CHOLECYSTECTOMY, LAPAROSCOPIC      HERNIA REPAIR       No family history on file. Social History     Tobacco Use    Smoking status: Never    Smokeless tobacco: Never   Vaping Use    Vaping Use: Never used   Substance Use Topics    Alcohol use: No    Drug use: No      Social History     Social History Narrative    PMH:    Health Maintenance:    Influenza Vaccination - (2017)    Dr Miguel Angel Christianson - nl exam     Medical Problems:    Ulcers - [place]. Has had EGD  (3 ulcers, erosion of stomach). repeat . repeat     Last colonoscopy , ,; nl. had , nl. GERD    migraines - follows with Dr Erin Henderson; typically around allergy season. started age 25. cervical DDD    overactive bladder - Dr Cynthia baumann - Dr Mehreen Verma; surgery    Had measles/chicken pox    [facility]    Surgical Hx:    [place]    Hernia repair - Rt ; Dr Cait GUARDADO [facility]    Vasectomy, T & A    Reviewed, no changes. FH:    Father:    Non Contributory. Mother:    Non Contributory. Brother 1 yr younger chron's    Dad - CHF  71 heavy smoker    Mom - healthy    Reviewed, no changes.     SH: Marital:  - x2, getting . limited caffeine. own [place]    Personal Habits: Cigarette Use: Does Not Smoke - occassional cigar. Alcohol: Drinks Alcoholic    Beverages Rarely. Vitals:    01/09/23 0905   BP: 132/68   Pulse: 68   Temp: 97.6 °F (36.4 °C)   SpO2: 97%   Weight: 251 lb (113.9 kg)       Physical Exam  Exam:  Const: Appears comfortable. No signs of acute distress present. Head/Face: Atraumatic on inspection. Eyes: EOMI in both eyes. No discharge from the eyes. PERRL. Sclerae clear. ENMT: Auditory canals normal. Tympanic membranes: intact and translucent. External nose WNL. Nasal mucosa is clear. Oropharynx: No erythema or exudate. Posterior pharynx is normal.  Neck: Supple. Palpation reveals no adenopathy. No masses appreciated. No JVD. Carotids: no  bruits. Resp: Respirations are unlabored. Clear to auscultation. No rales, rhonchi or wheezes appreciated  over the lungs bilaterally. CV: Rate is regular. Rhythm is regular. No gallop or rubs. No heart murmur appreciated. Extremities: No clubbing, cyanosis, or edema. No calf inflammation or tenderness. Chronic varicosities  Abdomen: Bowel sounds are normoactive. Abdomen is soft, nontender, and nondistended. No  abdominal masses. No palpable hepatosplenomegaly. Lymph: No palpable or visible regional lymphadenopathy. Musculoskeletal: no acute joint inflammation. ,  Bilateral knee crepitus limited range of motion low back with paralumbar tenderness no midline tenderness  Skin: Dry and warm with no rash. Skin normal to inspection and palpation overall. Neuro: Alert and oriented. Affect: appropriate. Upper Extremities: 5/5 bilaterally. Lower Extremities:  5/5 bilaterally. Sensation intact to light touch. Reflexes: DTR's are symmetric and 2+ bilaterally. .  Cranial Nerves: Cranial nerves grossly intact. Assessment and Plan:   Diagnosis Orders   1.  DDD (degenerative disc disease), lumbar  XR LUMBAR SPINE (2-3 VIEWS)    Amb External Referral To Chiropractic      2. Bilateral sciatica        3. Varicose veins of both lower extremities with pain  US DUP LOWER EXTREMITIES BILATERAL VENOUS      4. Hyperglycemia  Basic Metabolic Panel    metFORMIN (GLUCOPHAGE-XR) 500 MG extended release tablet      5. Metabolic syndrome  Basic Metabolic Panel    metFORMIN (GLUCOPHAGE-XR) 500 MG extended release tablet      6. Vitamin D deficiency        7. Vitamin B12 deficiency        8. Prostate disorder        9. Gastroesophageal reflux disease, unspecified whether esophagitis present           DDD LS spine  With radicular symptoms. counseled extensively. Differential reviewed, including serious etiologies. Again, refer to Dr. Karla Hsieh, counseled on heat/ice/topicals. Again, ordered x-ray. Not interested in gabapentin or pharmacotherapy. Counseled on heat/ice, range of motion, topicals. Varicose veins with pain  Counseled extensively. Differential reviewed, including serious etiologies. Recommend compression stockings. Check ultrasound      Class 2 obesity due to excess calories without serious comorbidity with body mass index (BMI) of 37.0 to 37.9 in adult    lifestyle modification emphasized. Defers formal intervention. Risk of obesity reviewed. Denies NEIL symptoms. Counseled on Mediterranean diet and weight watchers. Vitamin B12 deficiency    history of, continue B12 sublingual 1000 mcg daily, levels normal    Plantar fasciitis, left    counseled, continue per Dr. Angelina Franco, has had injection in the past      Chronic pain of both knees    Dr. Angelina Franco referred to Dr. Merle Mix. Counseled on use of Tylenol, topicals, heat/ice, range of motion    Allergic rhinitis due to allergen    stable with injections    Vertigo    counseled, differential reviewed, thoroughly investigated and worked up by Dr. Jamil Henley decades ago, has been stable for decades. Intolerant to meclizine because of sedation.   It is only with certain position particularly lying doing overhead activities such as working on a car/under a sink. Declines further evaluation/treatment at this time     Health maintenance examination  Health maintenance issues discussed at length  8/16/2021. Encouraged yearly physicals. Metabolic syndrome  Counseled extensively. Lifestyle modification reviewed. Importance of diet exercise and weight loss reviewed. Micro-macrovascular complications reviewed. Stable. Would like to try metformin  mg again center precautions including LA/diarrhea. BMP 1 month monitor. Hyperglycemia  Counseled. See comments under metabolic syndrome. Gastroesophageal reflux disease without esophagitis  Patient completely asymptomatic, followed with Dr. Mazin Millan. Omeprazole 20 mg daily, previously 40 mg with risks benefits reviewed including masking underlying etiologies, risk of calcium else works and renal insufficiency, C. diff and otherwise. Had endoscopy around 2018 appropriate timing of dosing of PPI reviewed. Also notices benefit on lactose-free. Discussed if symptom-free he may wean and stop this notify us if any recurrence. He has noticed symptoms without medicine, and if he knows he is going to eat poorly he will take an extra omeprazole with good control. Refilled. Dysthymia  Stable and asymptomatic on medication. Tolerating well. Risk benefits and ADRs reviewed. Appropriate counseling reviewed. Adamantly denies SI/HI. Appropriate timing of dosing of Cymbalta reviewed. He did temporarily reduce to 30 mg daily but had breakthrough so back on 60 mg doing well      Disorder of prostate  Chronic nocturia. Following with Dr. Shayla Pepe,, had MRI that was negative he states, he should follow-up and says he will. He would like to try Flomax meantime with standard precaution including interactions sedation hypotension dry mouth etc.  Discontinue if any ADRs.   Says he will follow-up soon    Vitamin D deficiency  Appropriate supplementation reviewed, encourage vitamin D3 2000 IUs daily. He does miss doses, compliance reviewed     Mixed hyperlipidemia  Elevated ASCVD. Counseled. Did not tolerate Crestor, or Lipitor. Myalgias. Not interested in pharmacotherapy this time including on statin were ever tried a statin again at this time. Counseled on lifestyle modification risk benefits of meds including no specific to statins reviewed. Risk of hyperlipidemia reviewed. NEIL  Sleep study ordered by allergist, moderate NEIL he states, he is trying to lose weight, states he qualified for CPAP or oral device but declined now. Risk of untreated NEIL reviewed. Standard precautions        Plan as above. Counseled extensively and differential diagnoses relevant to above were reviewed, including serious etiologies, risks and complications, especially of left uncontrolled. If relevant, instructions and  alternatives to meds/treatment reviewed, as well as interactions, and  SE's/ADRs reviewed, notify immediately if any, discontinuing new meds if any. Plan made after discussion and shared decision making. Check low back x-ray, ultrasound lower extremities but he has someone to be today so defers today. Refer back to Dr. Jesse Trevizo.  Not interested in pharmacotherapy including gabapentin. Not interested in statin. Would like to start metformin. At this point simply wants BMP follow-up 1 month sooner as needed. Is going to see urology soon as well. As long as symptoms steadily improve/resolve, and medical conditions follow the expected course, FU as below, sooner PRN. Return in about 1 month (around 2/9/2023). Educational materials and/or home exercises printed for patient's review and were included in patient instructions on his/her After Visit Summary and given to patient at the end of visit.        After discussion, patient and/or guardian verbalizes understanding, agrees, feels comfortable with and wishes to proceed with above treatment plan. Call for any pending results, FU sooner if abnormal, as needed or if any current symptoms persist/worsen. Advised patient to call with any new medication issues, and read all Rx info from pharmacy to assure aware of all possible risks and side effects of medication before taking. Reviewed age and gender appropriate health screening exams and vaccinations. Advised patient regarding importance of keeping up with recommended health maintenance and to schedule as soon as possible if overdue, as this is important in assessing for undiagnosed pathology, especially cancer, as well as protecting against potentially harmful/life threatening disease. Patient and/or guardian verbalizes understanding and agrees with above counseling, assessment and plan. All questions answered. Signs and symptoms to watch for discussed, serious signs and symptoms reviewed. ER if any. Deepti Jones MD    Patients are advised to check with insurance company to ensure coverage and to fully understand benefits and cost prior to any testing. This note was created with the assistance of voice recognition software. Document was reviewed however may contain grammatical errors.

## 2023-01-31 DIAGNOSIS — R35.1 BENIGN PROSTATIC HYPERPLASIA WITH NOCTURIA: ICD-10-CM

## 2023-01-31 DIAGNOSIS — N40.1 BENIGN PROSTATIC HYPERPLASIA WITH NOCTURIA: ICD-10-CM

## 2023-01-31 RX ORDER — TAMSULOSIN HYDROCHLORIDE 0.4 MG/1
0.4 CAPSULE ORAL DAILY
Qty: 30 CAPSULE | Refills: 2 | Status: SHIPPED | OUTPATIENT
Start: 2023-01-31

## 2023-05-05 DIAGNOSIS — E88.81 METABOLIC SYNDROME: ICD-10-CM

## 2023-05-05 DIAGNOSIS — R73.9 HYPERGLYCEMIA: ICD-10-CM

## 2023-05-05 LAB
ANION GAP SERPL CALCULATED.3IONS-SCNC: 8 MMOL/L (ref 7–16)
BUN SERPL-MCNC: 21 MG/DL (ref 6–23)
CALCIUM SERPL-MCNC: 9.2 MG/DL (ref 8.6–10.2)
CHLORIDE SERPL-SCNC: 105 MMOL/L (ref 98–107)
CO2 SERPL-SCNC: 26 MMOL/L (ref 22–29)
CREAT SERPL-MCNC: 1 MG/DL (ref 0.7–1.2)
GLUCOSE SERPL-MCNC: 113 MG/DL (ref 74–99)
POTASSIUM SERPL-SCNC: 4.5 MMOL/L (ref 3.5–5)
SODIUM SERPL-SCNC: 139 MMOL/L (ref 132–146)

## 2023-05-11 ENCOUNTER — OFFICE VISIT (OUTPATIENT)
Dept: PRIMARY CARE CLINIC | Age: 63
End: 2023-05-11
Payer: COMMERCIAL

## 2023-05-11 ENCOUNTER — PROCEDURE VISIT (OUTPATIENT)
Dept: PODIATRY | Age: 63
End: 2023-05-11
Payer: COMMERCIAL

## 2023-05-11 VITALS
WEIGHT: 251 LBS | BODY MASS INDEX: 37.07 KG/M2 | SYSTOLIC BLOOD PRESSURE: 110 MMHG | TEMPERATURE: 97.7 F | DIASTOLIC BLOOD PRESSURE: 62 MMHG

## 2023-05-11 VITALS
TEMPERATURE: 98.4 F | BODY MASS INDEX: 37.07 KG/M2 | OXYGEN SATURATION: 95 % | SYSTOLIC BLOOD PRESSURE: 128 MMHG | DIASTOLIC BLOOD PRESSURE: 70 MMHG | HEART RATE: 70 BPM | WEIGHT: 251 LBS

## 2023-05-11 DIAGNOSIS — K21.9 GASTROESOPHAGEAL REFLUX DISEASE, UNSPECIFIED WHETHER ESOPHAGITIS PRESENT: ICD-10-CM

## 2023-05-11 DIAGNOSIS — R73.9 HYPERGLYCEMIA: ICD-10-CM

## 2023-05-11 DIAGNOSIS — E55.9 VITAMIN D DEFICIENCY: ICD-10-CM

## 2023-05-11 DIAGNOSIS — G47.33 OSA (OBSTRUCTIVE SLEEP APNEA): ICD-10-CM

## 2023-05-11 DIAGNOSIS — M25.50 ARTHRALGIA, UNSPECIFIED JOINT: ICD-10-CM

## 2023-05-11 DIAGNOSIS — F32.A ANXIETY AND DEPRESSION: ICD-10-CM

## 2023-05-11 DIAGNOSIS — M62.81 MUSCLE WEAKNESS AFFECTING MOVEMENT OF FOOT: Primary | ICD-10-CM

## 2023-05-11 DIAGNOSIS — E88.81 METABOLIC SYNDROME: ICD-10-CM

## 2023-05-11 DIAGNOSIS — N42.9 PROSTATE DISORDER: ICD-10-CM

## 2023-05-11 DIAGNOSIS — E53.8 VITAMIN B12 DEFICIENCY: ICD-10-CM

## 2023-05-11 DIAGNOSIS — F41.9 ANXIETY AND DEPRESSION: ICD-10-CM

## 2023-05-11 DIAGNOSIS — E78.2 MIXED HYPERLIPIDEMIA: ICD-10-CM

## 2023-05-11 DIAGNOSIS — M51.36 DDD (DEGENERATIVE DISC DISEASE), LUMBAR: Primary | ICD-10-CM

## 2023-05-11 DIAGNOSIS — M79.2 NEURITIS: ICD-10-CM

## 2023-05-11 PROCEDURE — 99213 OFFICE O/P EST LOW 20 MIN: CPT | Performed by: PODIATRIST

## 2023-05-11 PROCEDURE — 99214 OFFICE O/P EST MOD 30 MIN: CPT | Performed by: FAMILY MEDICINE

## 2023-05-11 RX ORDER — METFORMIN HYDROCHLORIDE 500 MG/1
500 TABLET, EXTENDED RELEASE ORAL DAILY
Qty: 30 TABLET | Refills: 1 | Status: SHIPPED | OUTPATIENT
Start: 2023-05-11

## 2023-05-11 RX ORDER — CELECOXIB 100 MG/1
100 CAPSULE ORAL 2 TIMES DAILY PRN
Qty: 60 CAPSULE | Refills: 1 | Status: SHIPPED | OUTPATIENT
Start: 2023-05-11

## 2023-05-11 RX ORDER — PREGABALIN 75 MG/1
75 CAPSULE ORAL 2 TIMES DAILY
Qty: 120 CAPSULE | Refills: 0 | Status: SHIPPED | OUTPATIENT
Start: 2023-05-11 | End: 2023-07-10

## 2023-05-11 SDOH — ECONOMIC STABILITY: HOUSING INSECURITY
IN THE LAST 12 MONTHS, WAS THERE A TIME WHEN YOU DID NOT HAVE A STEADY PLACE TO SLEEP OR SLEPT IN A SHELTER (INCLUDING NOW)?: NO

## 2023-05-11 SDOH — ECONOMIC STABILITY: INCOME INSECURITY: HOW HARD IS IT FOR YOU TO PAY FOR THE VERY BASICS LIKE FOOD, HOUSING, MEDICAL CARE, AND HEATING?: NOT HARD AT ALL

## 2023-05-11 SDOH — ECONOMIC STABILITY: FOOD INSECURITY: WITHIN THE PAST 12 MONTHS, THE FOOD YOU BOUGHT JUST DIDN'T LAST AND YOU DIDN'T HAVE MONEY TO GET MORE.: NEVER TRUE

## 2023-05-11 SDOH — ECONOMIC STABILITY: FOOD INSECURITY: WITHIN THE PAST 12 MONTHS, YOU WORRIED THAT YOUR FOOD WOULD RUN OUT BEFORE YOU GOT MONEY TO BUY MORE.: NEVER TRUE

## 2023-05-11 NOTE — PROGRESS NOTES
on file   Housing Stability: Unknown    Unable to Pay for Housing in the Last Year: Not on file    Number of Will in the Last Year: Not on file    Unstable Housing in the Last Year: No       Vitals:    05/11/23 0658   BP: 110/62   Temp: 97.7 °F (36.5 °C)   TempSrc: Temporal   Weight: 251 lb (113.9 kg)       Focused Lower Extremity Physical Exam:  Vitals:    05/11/23 0658   BP: 110/62   Temp: 97.7 °F (36.5 °C)        Foot Exam    General  General Appearance: appears stated age and healthy   Orientation: alert and oriented to person, place, and time       Right Foot/Ankle     Inspection and Palpation  Tenderness: bony tenderness and metatarsals   Skin Exam: skin intact; Neurovascular  Dorsalis pedis: 3+  Posterior tibial: 3+  Saphenous nerve sensation: normal  Tibial nerve sensation: normal  Superficial peroneal nerve sensation: normal  Deep peroneal nerve sensation: diminished  Sural nerve sensation: diminished  Achilles reflex: 2+  Babinski reflex: 2+    Muscle Strength  Ankle dorsiflexion: 5  Ankle plantar flexion: 5  Ankle inversion: 5  Ankle eversion: 5  Great toe extension: 5  Great toe flexion: 5      Left Foot/Ankle      Neurovascular  Dorsalis pedis: 3+  Posterior tibial: 3+  Saphenous nerve sensation: diminished  Tibial nerve sensation: diminished  Superficial peroneal nerve sensation: normal  Deep peroneal nerve sensation: normal  Sural nerve sensation: normal  Achilles reflex: 2+  Babinski reflex: 2+    Muscle Strength  Ankle dorsiflexion: 5  Ankle plantar flexion: 5  Ankle inversion: 5  Ankle eversion: 5  Great toe extension: 5  Great toe flexion: 5         Right Ankle Exam     Muscle Strength   Dorsiflexion:  5/5  Plantar flexion:  5/5       Left Ankle Exam     Muscle Strength   The patient has normal left ankle strength.   Dorsiflexion:  5/5   Plantar flexion:  5/5           Vascular: pulses  dp  pt    Capillary Refill Time:   Hair growth  Skin:    Edema:    Neurologic:      Musculoskeletal/

## 2023-05-11 NOTE — PROGRESS NOTES
Non-Reactive 03/15/2022 07:45 AM     HIV  No results found for: [id number], HIV1QT  UA  Lab Results   Component Value Date/Time    COLORU Yellow 09/20/2022 07:07 AM    COLORU Yellow 02/11/2022 08:03 AM    COLORU Yellow 08/10/2021 08:03 AM    CLARITYU Clear 09/20/2022 07:07 AM    CLARITYU Clear 02/11/2022 08:03 AM    CLARITYU Clear 08/10/2021 08:03 AM    GLUCOSEU Negative 09/20/2022 07:07 AM    GLUCOSEU Negative 02/11/2022 08:03 AM    GLUCOSEU Negative 08/10/2021 08:03 AM    BILIRUBINUR Negative 09/20/2022 07:07 AM    BILIRUBINUR Negative 02/11/2022 08:03 AM    BILIRUBINUR Negative 08/10/2021 08:03 AM    KETUA Negative 09/20/2022 07:07 AM    KETUA Negative 02/11/2022 08:03 AM    KETUA Negative 08/10/2021 08:03 AM    SPECGRAV 1.020 09/20/2022 07:07 AM    SPECGRAV 1.020 02/11/2022 08:03 AM    SPECGRAV 1.020 08/10/2021 08:03 AM    BLOODU Negative 09/20/2022 07:07 AM    BLOODU Negative 02/11/2022 08:03 AM    BLOODU Negative 08/10/2021 08:03 AM    PHUR 6.0 09/20/2022 07:07 AM    PHUR 6.5 02/11/2022 08:03 AM    PHUR 6.5 08/10/2021 08:03 AM    PROTEINU Negative 09/20/2022 07:07 AM    PROTEINU Negative 02/11/2022 08:03 AM    PROTEINU Negative 08/10/2021 08:03 AM    UROBILINOGEN 0.2 09/20/2022 07:07 AM    UROBILINOGEN 0.2 02/11/2022 08:03 AM    UROBILINOGEN 1.0 08/10/2021 08:03 AM    NITRU Negative 09/20/2022 07:07 AM    NITRU Negative 02/11/2022 08:03 AM    NITRU Negative 08/10/2021 08:03 AM    LEUKOCYTESUR Negative 09/20/2022 07:07 AM    LEUKOCYTESUR TRACE 02/11/2022 08:03 AM    LEUKOCYTESUR Negative 08/10/2021 08:03 AM     Urine Micro/Albumin Ratio  Lab Results   Component Value Date/Time    MALBCR - 09/20/2022 07:07 AM         Review of Systems  ROS:  Const: Denies chills, fever, malaise and sweats. Eyes: Denies discharge, pain, redness and visual disturbance. ENMT: Denies earaches, other ear symptoms. Denies nasal or sinus symptoms other than stated  above. Denies mouth and tongue lesions and sore throat.   CV: Denies

## 2023-06-23 DIAGNOSIS — E53.8 VITAMIN B12 DEFICIENCY: ICD-10-CM

## 2023-06-23 DIAGNOSIS — E88.81 METABOLIC SYNDROME: ICD-10-CM

## 2023-06-23 DIAGNOSIS — F41.9 ANXIETY AND DEPRESSION: ICD-10-CM

## 2023-06-23 DIAGNOSIS — E55.9 VITAMIN D DEFICIENCY: ICD-10-CM

## 2023-06-23 DIAGNOSIS — M25.50 ARTHRALGIA, UNSPECIFIED JOINT: ICD-10-CM

## 2023-06-23 DIAGNOSIS — F32.A ANXIETY AND DEPRESSION: ICD-10-CM

## 2023-06-23 DIAGNOSIS — E78.2 MIXED HYPERLIPIDEMIA: ICD-10-CM

## 2023-06-23 DIAGNOSIS — N42.9 PROSTATE DISORDER: ICD-10-CM

## 2023-06-23 DIAGNOSIS — R73.9 HYPERGLYCEMIA: ICD-10-CM

## 2023-06-23 LAB
ALBUMIN SERPL-MCNC: 3.9 G/DL (ref 3.5–5.2)
ALP SERPL-CCNC: 53 U/L (ref 40–129)
ALT SERPL-CCNC: 24 U/L (ref 0–40)
ANION GAP SERPL CALCULATED.3IONS-SCNC: 12 MMOL/L (ref 7–16)
AST SERPL-CCNC: 24 U/L (ref 0–39)
BASOPHILS # BLD: 0.05 E9/L (ref 0–0.2)
BASOPHILS NFR BLD: 1 % (ref 0–2)
BILIRUB SERPL-MCNC: 0.7 MG/DL (ref 0–1.2)
BILIRUB UR QL STRIP: NEGATIVE
BUN SERPL-MCNC: 22 MG/DL (ref 6–23)
CALCIUM SERPL-MCNC: 9.1 MG/DL (ref 8.6–10.2)
CHLORIDE SERPL-SCNC: 105 MMOL/L (ref 98–107)
CHOLESTEROL, TOTAL: 169 MG/DL (ref 0–199)
CLARITY UR: CLEAR
CO2 SERPL-SCNC: 24 MMOL/L (ref 22–29)
COLOR UR: YELLOW
CREAT SERPL-MCNC: 0.9 MG/DL (ref 0.7–1.2)
EOSINOPHIL # BLD: 0.24 E9/L (ref 0.05–0.5)
EOSINOPHIL NFR BLD: 4.9 % (ref 0–6)
ERYTHROCYTE [DISTWIDTH] IN BLOOD BY AUTOMATED COUNT: 13.5 FL (ref 11.5–15)
FOLATE SERPL-MCNC: 12.7 NG/ML (ref 4.8–24.2)
GLUCOSE SERPL-MCNC: 102 MG/DL (ref 74–99)
GLUCOSE UR STRIP-MCNC: NEGATIVE MG/DL
HBA1C MFR BLD: 5.8 % (ref 4–5.6)
HCT VFR BLD AUTO: 43.5 % (ref 37–54)
HDLC SERPL-MCNC: 41 MG/DL
HGB BLD-MCNC: 13.8 G/DL (ref 12.5–16.5)
HGB UR QL STRIP: NEGATIVE
IMM GRANULOCYTES # BLD: 0.01 E9/L
IMM GRANULOCYTES NFR BLD: 0.2 % (ref 0–5)
KETONES UR STRIP-MCNC: NEGATIVE MG/DL
LDLC SERPL CALC-MCNC: 108 MG/DL (ref 0–99)
LEUKOCYTE ESTERASE UR QL STRIP: NEGATIVE
LYMPHOCYTES # BLD: 1.82 E9/L (ref 1.5–4)
LYMPHOCYTES NFR BLD: 37.3 % (ref 20–42)
MCH RBC QN AUTO: 29.4 PG (ref 26–35)
MCHC RBC AUTO-ENTMCNC: 31.7 % (ref 32–34.5)
MCV RBC AUTO: 92.8 FL (ref 80–99.9)
MONOCYTES # BLD: 0.58 E9/L (ref 0.1–0.95)
MONOCYTES NFR BLD: 11.9 % (ref 2–12)
NEUTROPHILS # BLD: 2.18 E9/L (ref 1.8–7.3)
NEUTS SEG NFR BLD: 44.7 % (ref 43–80)
NITRITE UR QL STRIP: NEGATIVE
PH UR STRIP: 6 [PH] (ref 5–9)
PLATELET # BLD AUTO: 193 E9/L (ref 130–450)
PMV BLD AUTO: 11.3 FL (ref 7–12)
POTASSIUM SERPL-SCNC: 3.9 MMOL/L (ref 3.5–5)
PROT SERPL-MCNC: 6.6 G/DL (ref 6.4–8.3)
PROT UR STRIP-MCNC: NEGATIVE MG/DL
PSA SERPL-MCNC: 3.1 NG/ML (ref 0–4)
RBC # BLD AUTO: 4.69 E12/L (ref 3.8–5.8)
RHEUMATOID FACT SER NEPH-ACNC: <10 IU/ML (ref 0–13)
SODIUM SERPL-SCNC: 141 MMOL/L (ref 132–146)
SP GR UR STRIP: 1.02 (ref 1–1.03)
TRIGL SERPL-MCNC: 101 MG/DL (ref 0–149)
TSH SERPL-MCNC: 1.65 UIU/ML (ref 0.27–4.2)
UROBILINOGEN UR STRIP-ACNC: 0.2 E.U./DL
VIT B12 SERPL-MCNC: 712 PG/ML (ref 211–946)
VITAMIN D 25-HYDROXY: 20 NG/ML (ref 30–100)
VLDLC SERPL CALC-MCNC: 20 MG/DL
WBC # BLD: 4.9 E9/L (ref 4.5–11.5)

## 2023-06-26 ENCOUNTER — OFFICE VISIT (OUTPATIENT)
Dept: PRIMARY CARE CLINIC | Age: 63
End: 2023-06-26
Payer: COMMERCIAL

## 2023-06-26 VITALS
BODY MASS INDEX: 36.92 KG/M2 | SYSTOLIC BLOOD PRESSURE: 128 MMHG | TEMPERATURE: 97.7 F | WEIGHT: 250 LBS | DIASTOLIC BLOOD PRESSURE: 62 MMHG | HEART RATE: 60 BPM

## 2023-06-26 DIAGNOSIS — E53.8 VITAMIN B12 DEFICIENCY: ICD-10-CM

## 2023-06-26 DIAGNOSIS — G47.33 OSA (OBSTRUCTIVE SLEEP APNEA): ICD-10-CM

## 2023-06-26 DIAGNOSIS — M51.36 DDD (DEGENERATIVE DISC DISEASE), LUMBAR: ICD-10-CM

## 2023-06-26 DIAGNOSIS — E78.2 MIXED HYPERLIPIDEMIA: ICD-10-CM

## 2023-06-26 DIAGNOSIS — Z00.00 HEALTH MAINTENANCE EXAMINATION: Primary | ICD-10-CM

## 2023-06-26 DIAGNOSIS — E88.81 METABOLIC SYNDROME: ICD-10-CM

## 2023-06-26 DIAGNOSIS — F32.A ANXIETY AND DEPRESSION: ICD-10-CM

## 2023-06-26 DIAGNOSIS — E55.9 VITAMIN D DEFICIENCY: ICD-10-CM

## 2023-06-26 DIAGNOSIS — Z12.11 COLON CANCER SCREENING: ICD-10-CM

## 2023-06-26 DIAGNOSIS — F41.9 ANXIETY AND DEPRESSION: ICD-10-CM

## 2023-06-26 LAB — ANTI DNA DOUBLE STRANDED: NEGATIVE

## 2023-06-26 PROCEDURE — 99396 PREV VISIT EST AGE 40-64: CPT | Performed by: FAMILY MEDICINE

## 2023-06-26 PROCEDURE — 99214 OFFICE O/P EST MOD 30 MIN: CPT | Performed by: FAMILY MEDICINE

## 2023-06-26 RX ORDER — ROSUVASTATIN CALCIUM 10 MG/1
10 TABLET, COATED ORAL
Qty: 30 TABLET | Refills: 1 | Status: SHIPPED | OUTPATIENT
Start: 2023-06-26

## 2023-06-27 LAB
ANA SER QL: NEGATIVE
Lab: NORMAL
REPORT: NORMAL
THIS TEST SENT TO: NORMAL

## 2023-06-28 LAB — CCP AB SER IA-ACNC: 1.1 U/ML (ref 0–7)

## 2023-07-06 ENCOUNTER — TELEPHONE (OUTPATIENT)
Dept: PRIMARY CARE CLINIC | Age: 63
End: 2023-07-06

## 2023-07-06 NOTE — TELEPHONE ENCOUNTER
----- Message from Brain Short sent at 7/6/2023 11:52 AM EDT -----  Subject: Message to Provider    QUESTIONS  Information for Provider? pt called in and said that his lab results needs   to be fax over to Dr. Sincere Tony? fax [phone], please follow up   ---------------------------------------------------------------------------  --------------  Ronnie Jim Thorpe Nikki  [phone]; OK to leave message on voicemail  ---------------------------------------------------------------------------  --------------  SCRIPT ANSWERS  Relationship to Patient?  Self

## 2023-07-25 DIAGNOSIS — R73.9 HYPERGLYCEMIA: ICD-10-CM

## 2023-07-25 DIAGNOSIS — E88.81 METABOLIC SYNDROME: ICD-10-CM

## 2023-07-25 RX ORDER — METFORMIN HYDROCHLORIDE 500 MG/1
500 TABLET, EXTENDED RELEASE ORAL DAILY
Qty: 30 TABLET | Refills: 1 | Status: SHIPPED | OUTPATIENT
Start: 2023-07-25

## 2023-08-04 DIAGNOSIS — E78.2 MIXED HYPERLIPIDEMIA: ICD-10-CM

## 2023-08-04 LAB
ALBUMIN SERPL-MCNC: 4.3 G/DL (ref 3.5–5.2)
ALP BLD-CCNC: 51 U/L (ref 40–129)
ALT SERPL-CCNC: 13 U/L (ref 0–40)
ANION GAP SERPL CALCULATED.3IONS-SCNC: 13 MMOL/L (ref 7–16)
AST SERPL-CCNC: 17 U/L (ref 0–39)
BILIRUB SERPL-MCNC: 0.4 MG/DL (ref 0–1.2)
BUN BLDV-MCNC: 21 MG/DL (ref 6–23)
CALCIUM SERPL-MCNC: 9.4 MG/DL (ref 8.6–10.2)
CHLORIDE BLD-SCNC: 104 MMOL/L (ref 98–107)
CHOLESTEROL: 128 MG/DL
CO2: 24 MMOL/L (ref 22–29)
CREAT SERPL-MCNC: 1 MG/DL (ref 0.7–1.2)
GFR SERPL CREATININE-BSD FRML MDRD: >60 ML/MIN/1.73M2
GLUCOSE BLD-MCNC: 97 MG/DL (ref 74–99)
HDLC SERPL-MCNC: 38 MG/DL
LDL CHOLESTEROL: 72 MG/DL
POTASSIUM SERPL-SCNC: 4.6 MMOL/L (ref 3.5–5)
SODIUM BLD-SCNC: 141 MMOL/L (ref 132–146)
TOTAL CK: 92 U/L (ref 20–200)
TOTAL PROTEIN: 6.6 G/DL (ref 6.4–8.3)
TRIGL SERPL-MCNC: 91 MG/DL
VLDLC SERPL CALC-MCNC: 18 MG/DL

## 2023-08-07 ENCOUNTER — OFFICE VISIT (OUTPATIENT)
Dept: PRIMARY CARE CLINIC | Age: 63
End: 2023-08-07
Payer: MEDICARE

## 2023-08-07 VITALS
WEIGHT: 238 LBS | BODY MASS INDEX: 35.15 KG/M2 | SYSTOLIC BLOOD PRESSURE: 130 MMHG | TEMPERATURE: 97.7 F | DIASTOLIC BLOOD PRESSURE: 68 MMHG | OXYGEN SATURATION: 96 %

## 2023-08-07 DIAGNOSIS — M51.36 DDD (DEGENERATIVE DISC DISEASE), LUMBAR: Primary | ICD-10-CM

## 2023-08-07 DIAGNOSIS — E55.9 VITAMIN D DEFICIENCY: ICD-10-CM

## 2023-08-07 DIAGNOSIS — E88.81 METABOLIC SYNDROME: ICD-10-CM

## 2023-08-07 DIAGNOSIS — E78.2 MIXED HYPERLIPIDEMIA: ICD-10-CM

## 2023-08-07 DIAGNOSIS — R73.9 HYPERGLYCEMIA: ICD-10-CM

## 2023-08-07 PROCEDURE — 99214 OFFICE O/P EST MOD 30 MIN: CPT | Performed by: FAMILY MEDICINE

## 2023-08-07 RX ORDER — ROSUVASTATIN CALCIUM 10 MG/1
10 TABLET, COATED ORAL
Qty: 30 TABLET | Refills: 3 | Status: SHIPPED | OUTPATIENT
Start: 2023-08-07

## 2023-08-07 RX ORDER — CELECOXIB 100 MG/1
100 CAPSULE ORAL 2 TIMES DAILY PRN
Qty: 60 CAPSULE | Refills: 3 | Status: SHIPPED | OUTPATIENT
Start: 2023-08-07

## 2023-08-07 RX ORDER — PREDNISONE 10 MG/1
TABLET ORAL
Qty: 12 TABLET | Refills: 0 | Status: SHIPPED | OUTPATIENT
Start: 2023-08-07 | End: 2023-08-13

## 2023-08-07 RX ORDER — METFORMIN HYDROCHLORIDE 500 MG/1
500 TABLET, EXTENDED RELEASE ORAL DAILY
Qty: 30 TABLET | Refills: 3 | Status: SHIPPED | OUTPATIENT
Start: 2023-08-07

## 2023-08-07 NOTE — PROGRESS NOTES
Negative 09/20/2022 07:07 AM    PROTEINU Negative 02/11/2022 08:03 AM    UROBILINOGEN 0.2 06/23/2023 07:23 AM    UROBILINOGEN 0.2 09/20/2022 07:07 AM    UROBILINOGEN 0.2 02/11/2022 08:03 AM    NITRU Negative 06/23/2023 07:23 AM    NITRU Negative 09/20/2022 07:07 AM    NITRU Negative 02/11/2022 08:03 AM    LEUKOCYTESUR Negative 06/23/2023 07:23 AM    LEUKOCYTESUR Negative 09/20/2022 07:07 AM    LEUKOCYTESUR TRACE 02/11/2022 08:03 AM     Urine Micro/Albumin Ratio  Lab Results   Component Value Date/Time    MALBCR - 09/20/2022 07:07 AM         Review of Systems  ROS:  Const: Denies chills, fever, malaise and sweats. Eyes: Denies discharge, pain, redness and visual disturbance. ENMT: Denies earaches, other ear symptoms. Denies nasal or sinus symptoms other than stated  above. Denies mouth and tongue lesions and sore throat. CV: Denies chest discomfort, pain; diaphoresis, dizziness, edema, lightheadedness, orthopnea,  palpitations, syncope and near syncopal episode or any exertional symptoms  Resp: Denies cough, hemoptysis, pleuritic pain, SOB, sputum production and wheezing. GI: Denies abdominal pain, change in bowel habits, hematochezia, melena, nausea and vomiting. : Denies urinary symptoms including dysuria , urgency, frequency or hematuria. Musculo: As above  Skin: Denies bruising and rash.   Neuro: Denies headache,  stiff neck, and focal weakness slurred speech or facial  droop, intermittent radicular symptoms as above bilateral legs  Hema/Lymph: Denies bleeding/bruising tendency and enlarged lymph nodes          Current Outpatient Medications:     celecoxib (CELEBREX) 100 MG capsule, Take 1 capsule by mouth 2 times daily as needed for Pain, Disp: 60 capsule, Rfl: 3    metFORMIN (GLUCOPHAGE-XR) 500 MG extended release tablet, Take 1 tablet by mouth daily, Disp: 30 tablet, Rfl: 3    rosuvastatin (CRESTOR) 10 MG tablet, Take 1 tablet by mouth Daily with supper, Disp: 30 tablet, Rfl: 3    predniSONE (DELTASONE)

## 2023-08-14 ENCOUNTER — HOSPITAL ENCOUNTER (OUTPATIENT)
Dept: MRI IMAGING | Age: 63
Discharge: HOME | End: 2023-08-16
Attending: FAMILY MEDICINE
Payer: MEDICARE

## 2023-08-14 DIAGNOSIS — M51.36 DDD (DEGENERATIVE DISC DISEASE), LUMBAR: ICD-10-CM

## 2023-08-14 PROCEDURE — 72148 MRI LUMBAR SPINE W/O DYE: CPT

## 2023-08-16 NOTE — RESULT ENCOUNTER NOTE
There are degenerative changes noted in the low back with disc bulging although noted to be mild, as well as moderate facet degenerative changes. Await Dr. Sampson Reyna opinion and [facility] pain management, CC results to them. However I do worry the extent of these abnormalities versus the extent of his symptoms. I question whether we should also check a CT of the abdomen/pelvis. Please have him follow-up so that we can recheck and reassess. Check if any abdominal pain. Rare things such as AAA can cause back pain as well although not usually positional.  Agreeable to CT please notify so I can order as well.

## 2023-08-29 ENCOUNTER — OFFICE VISIT (OUTPATIENT)
Dept: PRIMARY CARE CLINIC | Age: 63
End: 2023-08-29
Payer: MEDICARE

## 2023-08-29 ENCOUNTER — PROCEDURE VISIT (OUTPATIENT)
Dept: PODIATRY | Age: 63
End: 2023-08-29
Payer: MEDICARE

## 2023-08-29 VITALS
SYSTOLIC BLOOD PRESSURE: 126 MMHG | BODY MASS INDEX: 35.44 KG/M2 | TEMPERATURE: 98.2 F | DIASTOLIC BLOOD PRESSURE: 80 MMHG | WEIGHT: 240 LBS

## 2023-08-29 VITALS
OXYGEN SATURATION: 97 % | TEMPERATURE: 98.2 F | HEART RATE: 63 BPM | HEIGHT: 69 IN | DIASTOLIC BLOOD PRESSURE: 80 MMHG | SYSTOLIC BLOOD PRESSURE: 126 MMHG | BODY MASS INDEX: 35.55 KG/M2 | WEIGHT: 240 LBS

## 2023-08-29 DIAGNOSIS — M72.2 BILATERAL PLANTAR FASCIITIS: ICD-10-CM

## 2023-08-29 DIAGNOSIS — E78.2 MIXED HYPERLIPIDEMIA: ICD-10-CM

## 2023-08-29 DIAGNOSIS — R20.2 PARESTHESIA: Primary | ICD-10-CM

## 2023-08-29 DIAGNOSIS — M51.36 DDD (DEGENERATIVE DISC DISEASE), LUMBAR: ICD-10-CM

## 2023-08-29 DIAGNOSIS — M25.561 CHRONIC PAIN OF BOTH KNEES: ICD-10-CM

## 2023-08-29 DIAGNOSIS — M79.2 NEURITIS: Primary | ICD-10-CM

## 2023-08-29 DIAGNOSIS — G89.29 CHRONIC PAIN OF BOTH KNEES: ICD-10-CM

## 2023-08-29 DIAGNOSIS — E88.81 METABOLIC SYNDROME: ICD-10-CM

## 2023-08-29 DIAGNOSIS — M62.81 MUSCLE WEAKNESS AFFECTING MOVEMENT OF FOOT: ICD-10-CM

## 2023-08-29 DIAGNOSIS — G25.81 RESTLESS LEG SYNDROME: ICD-10-CM

## 2023-08-29 DIAGNOSIS — M25.562 CHRONIC PAIN OF BOTH KNEES: ICD-10-CM

## 2023-08-29 DIAGNOSIS — R10.84 GENERALIZED ABDOMINAL PAIN: ICD-10-CM

## 2023-08-29 PROCEDURE — 99212 OFFICE O/P EST SF 10 MIN: CPT | Performed by: PODIATRIST

## 2023-08-29 PROCEDURE — 99214 OFFICE O/P EST MOD 30 MIN: CPT | Performed by: FAMILY MEDICINE

## 2023-08-29 PROCEDURE — 1123F ACP DISCUSS/DSCN MKR DOCD: CPT | Performed by: FAMILY MEDICINE

## 2023-08-29 PROCEDURE — 1123F ACP DISCUSS/DSCN MKR DOCD: CPT | Performed by: PODIATRIST

## 2023-08-29 RX ORDER — GABAPENTIN 300 MG/1
300 CAPSULE ORAL NIGHTLY
Qty: 30 CAPSULE | Refills: 1 | Status: SHIPPED | OUTPATIENT
Start: 2023-08-29 | End: 2023-09-28

## 2023-08-29 RX ORDER — METHYLPREDNISOLONE ACETATE 80 MG/ML
80 INJECTION, SUSPENSION INTRA-ARTICULAR; INTRALESIONAL; INTRAMUSCULAR; SOFT TISSUE ONCE
Status: COMPLETED | OUTPATIENT
Start: 2023-08-29 | End: 2023-08-29

## 2023-08-29 RX ADMIN — METHYLPREDNISOLONE ACETATE 80 MG: 80 INJECTION, SUSPENSION INTRA-ARTICULAR; INTRALESIONAL; INTRAMUSCULAR; SOFT TISSUE at 09:01

## 2023-08-29 ASSESSMENT — PATIENT HEALTH QUESTIONNAIRE - PHQ9
4. FEELING TIRED OR HAVING LITTLE ENERGY: 0
2. FEELING DOWN, DEPRESSED OR HOPELESS: 0
SUM OF ALL RESPONSES TO PHQ QUESTIONS 1-9: 0
9. THOUGHTS THAT YOU WOULD BE BETTER OFF DEAD, OR OF HURTING YOURSELF: 0
SUM OF ALL RESPONSES TO PHQ QUESTIONS 1-9: 0
SUM OF ALL RESPONSES TO PHQ QUESTIONS 1-9: 0
3. TROUBLE FALLING OR STAYING ASLEEP: 0
6. FEELING BAD ABOUT YOURSELF - OR THAT YOU ARE A FAILURE OR HAVE LET YOURSELF OR YOUR FAMILY DOWN: 0
SUM OF ALL RESPONSES TO PHQ9 QUESTIONS 1 & 2: 0
1. LITTLE INTEREST OR PLEASURE IN DOING THINGS: 0
5. POOR APPETITE OR OVEREATING: 0
10. IF YOU CHECKED OFF ANY PROBLEMS, HOW DIFFICULT HAVE THESE PROBLEMS MADE IT FOR YOU TO DO YOUR WORK, TAKE CARE OF THINGS AT HOME, OR GET ALONG WITH OTHER PEOPLE: 0
SUM OF ALL RESPONSES TO PHQ QUESTIONS 1-9: 0
7. TROUBLE CONCENTRATING ON THINGS, SUCH AS READING THE NEWSPAPER OR WATCHING TELEVISION: 0
8. MOVING OR SPEAKING SO SLOWLY THAT OTHER PEOPLE COULD HAVE NOTICED. OR THE OPPOSITE, BEING SO FIGETY OR RESTLESS THAT YOU HAVE BEEN MOVING AROUND A LOT MORE THAN USUAL: 0

## 2023-08-29 ASSESSMENT — LIFESTYLE VARIABLES
HOW MANY STANDARD DRINKS CONTAINING ALCOHOL DO YOU HAVE ON A TYPICAL DAY: 1 OR 2
HOW OFTEN DO YOU HAVE A DRINK CONTAINING ALCOHOL: MONTHLY OR LESS

## 2023-08-29 NOTE — PROGRESS NOTES
Lorna Living : 1960 Sex: male  Age: 70 y.o. Patient was referred by Pinky Heaton MD    Chief Complaint   Patient presents with    Toe Pain     Pinky Heaton MD  2023       SUBJECTIVE   . This patient is seen today for several different issues including foot leg and ankle pain. Patient relates that he is seen  Does have lower back issues but his legs feet are tingling sometimes at night sometimes during the day. No trauma noted. Patient denies fever chills infections. off lyrica   Toe Pain     Review of Systems  Const: Denies constitutional symptoms  Musculo: Denies symptoms other than stated above  Skin: Denies symptoms other than stated above       Current Outpatient Medications:     gabapentin (NEURONTIN) 300 MG capsule, Take 1 capsule by mouth nightly for 30 days.  Intended supply: 30 days, Disp: 30 capsule, Rfl: 1    celecoxib (CELEBREX) 100 MG capsule, Take 1 capsule by mouth 2 times daily as needed for Pain, Disp: 60 capsule, Rfl: 3    metFORMIN (GLUCOPHAGE-XR) 500 MG extended release tablet, Take 1 tablet by mouth daily, Disp: 30 tablet, Rfl: 3    rosuvastatin (CRESTOR) 10 MG tablet, Take 1 tablet by mouth Daily with supper, Disp: 30 tablet, Rfl: 3    tamsulosin (FLOMAX) 0.4 MG capsule, Take 1 capsule by mouth daily, Disp: 30 capsule, Rfl: 2    omeprazole (PRILOSEC) 20 MG delayed release capsule, Take 1 capsule by mouth daily, Disp: 30 capsule, Rfl: 12    DULoxetine (CYMBALTA) 60 MG extended release capsule, Take 1 capsule by mouth daily, Disp: 30 capsule, Rfl: 12    tadalafil (CIALIS) 5 MG tablet, Take 1 tablet by mouth daily, Disp: , Rfl:   No Known Allergies      Past Medical History:   Diagnosis Date    Disorder of prostate     Dysthymic disorder     GERD (gastroesophageal reflux disease)     Migraine     Tinea unguium     Vitamin D deficiency      Past Surgical History:   Procedure Laterality Date    CHOLECYSTECTOMY, LAPAROSCOPIC      HERNIA REPAIR       No family history on

## 2023-08-29 NOTE — PROGRESS NOTES
symmetric and 2+ bilaterally. .  Cranial Nerves: Cranial nerves grossly intact. Assessment and Plan:   Diagnosis Orders   1. Paresthesia  gabapentin (NEURONTIN) 300 MG capsule    EMG    methylPREDNISolone acetate (DEPO-MEDROL) injection 80 mg    BUN    Creatinine      2. DDD (degenerative disc disease), lumbar  methylPREDNISolone acetate (DEPO-MEDROL) injection 80 mg    BUN    Creatinine      3. Generalized abdominal pain  CT ABDOMEN PELVIS W IV CONTRAST Additional Contrast? Oral    BUN    Creatinine      4. Metabolic syndrome  BUN    Creatinine      5. Mixed hyperlipidemia               No problem-specific Assessment & Plan notes found for this encounter. DDD LS spine  With radicular symptoms. counseled extensively. Differential reviewed, including serious etiologies. Previous x-ray showed mild degenerative changes but he saw Dr. Tracie Gamez who felt that should have been done standing instead of lying but did not order. Saw Dr. Tracie Gamez but only twice because he states it was too expensive. Deferred gabapentin in the past.  Dr. Gregg Morgan tried Lyrica, no significant benefit and cause depression so stopped it. Has Celebrex 100 mg twice a day if needed with precautions including GI renal cardiopulmonary, does not seem to be giving much relief and so we discussed only taking it \"PRN\" and not using if not helping. Also taking Tylenol does not seem to be helping, discussed using it as directed as needed. Heat/ice. Counseled on potential mattress issues. Awaiting Dr[facility] Tracie Gamez [facility]. MRI read as  \"IMPRESSION:  1. No evidence of significant central canal stenosis or neural foraminal  stenosis. 2. Degenerative change. Mild disc bulges at L4-5 and L5-S1.  \" Although symptoms most consistent with lumbar radiculopathy other differential reviewed with MRI not showing significant findings.   Conceivably his symptoms could be retroperitoneal/abdominal and we will check CT

## 2023-09-01 DIAGNOSIS — R20.2 PARESTHESIA: ICD-10-CM

## 2023-09-01 DIAGNOSIS — E88.810 METABOLIC SYNDROME: ICD-10-CM

## 2023-09-01 DIAGNOSIS — M51.36 DDD (DEGENERATIVE DISC DISEASE), LUMBAR: ICD-10-CM

## 2023-09-01 DIAGNOSIS — E78.2 MIXED HYPERLIPIDEMIA: ICD-10-CM

## 2023-09-01 DIAGNOSIS — E55.9 VITAMIN D DEFICIENCY: ICD-10-CM

## 2023-09-01 DIAGNOSIS — R10.84 GENERALIZED ABDOMINAL PAIN: ICD-10-CM

## 2023-09-01 DIAGNOSIS — R73.9 HYPERGLYCEMIA: ICD-10-CM

## 2023-09-01 LAB
ALBUMIN SERPL-MCNC: 4 G/DL (ref 3.5–5.2)
ALP BLD-CCNC: 56 U/L (ref 40–129)
ALT SERPL-CCNC: 16 U/L (ref 0–40)
ANION GAP SERPL CALCULATED.3IONS-SCNC: 14 MMOL/L (ref 7–16)
AST SERPL-CCNC: 19 U/L (ref 0–39)
BILIRUB SERPL-MCNC: 0.5 MG/DL (ref 0–1.2)
BUN BLDV-MCNC: 16 MG/DL (ref 6–23)
CALCIUM SERPL-MCNC: 9.7 MG/DL (ref 8.6–10.2)
CHLORIDE BLD-SCNC: 104 MMOL/L (ref 98–107)
CHOLESTEROL: 180 MG/DL
CO2: 25 MMOL/L (ref 22–29)
CREAT SERPL-MCNC: 0.9 MG/DL (ref 0.7–1.2)
GFR SERPL CREATININE-BSD FRML MDRD: >60 ML/MIN/1.73M2
GLUCOSE BLD-MCNC: 109 MG/DL (ref 74–99)
HBA1C MFR BLD: 5.8 % (ref 4–5.6)
HDLC SERPL-MCNC: 41 MG/DL
LDL CHOLESTEROL: 120 MG/DL
POTASSIUM SERPL-SCNC: 4.5 MMOL/L (ref 3.5–5)
SODIUM BLD-SCNC: 143 MMOL/L (ref 132–146)
TOTAL CK: 112 U/L (ref 20–200)
TOTAL PROTEIN: 6.6 G/DL (ref 6.4–8.3)
TRIGL SERPL-MCNC: 97 MG/DL
VITAMIN D 25-HYDROXY: 32.6 NG/ML (ref 30–100)
VLDLC SERPL CALC-MCNC: 19 MG/DL

## 2023-09-05 ENCOUNTER — HOSPITAL ENCOUNTER (OUTPATIENT)
Dept: CT IMAGING | Age: 63
Discharge: HOME | End: 2023-09-07
Attending: FAMILY MEDICINE
Payer: MEDICARE

## 2023-09-05 DIAGNOSIS — R10.84 GENERALIZED ABDOMINAL PAIN: ICD-10-CM

## 2023-09-05 PROCEDURE — 2580000003 HC RX 258: Performed by: RADIOLOGY

## 2023-09-05 PROCEDURE — 74177 CT ABD & PELVIS W/CONTRAST: CPT

## 2023-09-05 PROCEDURE — 6360000004 HC RX CONTRAST MEDICATION: Performed by: RADIOLOGY

## 2023-09-05 RX ORDER — SODIUM CHLORIDE 0.9 % (FLUSH) 0.9 %
10 SYRINGE (ML) INJECTION PRN
Status: DISCONTINUED | OUTPATIENT
Start: 2023-09-05 | End: 2023-09-08 | Stop reason: HOSPADM

## 2023-09-05 RX ADMIN — IOPAMIDOL 50 ML: 755 INJECTION, SOLUTION INTRAVENOUS at 13:02

## 2023-09-05 RX ADMIN — IOPAMIDOL 18 ML: 755 INJECTION, SOLUTION INTRAVENOUS at 13:02

## 2023-09-05 RX ADMIN — SODIUM CHLORIDE, PRESERVATIVE FREE 10 ML: 5 INJECTION INTRAVENOUS at 13:00

## 2023-09-06 NOTE — RESULT ENCOUNTER NOTE
CT abdomen/pelvis overall negative with some incidentals including possible small liver cyst (clinically insignificant), small hiatal hernia, stool scattered diffusely, small right inguinal hernia containing fat only.   Keep follow-up to review more detail

## 2023-09-15 DIAGNOSIS — R20.2 PARESTHESIA: ICD-10-CM

## 2023-10-10 DIAGNOSIS — F41.9 ANXIETY AND DEPRESSION: ICD-10-CM

## 2023-10-10 DIAGNOSIS — F32.A ANXIETY AND DEPRESSION: ICD-10-CM

## 2023-10-10 RX ORDER — DULOXETIN HYDROCHLORIDE 60 MG/1
60 CAPSULE, DELAYED RELEASE ORAL DAILY
Qty: 30 CAPSULE | Refills: 3 | Status: SHIPPED | OUTPATIENT
Start: 2023-10-10

## 2023-10-10 NOTE — TELEPHONE ENCOUNTER
----- Message from Jonny Whittington sent at 10/10/2023  2:43 PM EDT -----  Subject: Refill Request    QUESTIONS  Name of Medication? DULoxetine (CYMBALTA) 60 MG extended release capsule  Patient-reported dosage and instructions? Take 1 capsule by mouth daily  How many days do you have left? 1  Preferred Pharmacy? [address] [place] [address] [id number]  Pharmacy phone number (if available)? [phone]  Additional Information for Provider? Pt has tomorrow left on this med will   need a refill. Prescription  on 23. Last [facility] visit was on   23. Next visit on 23 was canceled no upcoming.   ---------------------------------------------------------------------------  --------------  CALL BACK INFO  What is the best way for the office to contact you? OK to leave message on   voicemail,Do not leave any message, patient will call back for answer  Preferred Call Back Phone Number? [phone]  ---------------------------------------------------------------------------  --------------  SCRIPT ANSWERS  Relationship to Patient?  Self

## 2023-10-19 DIAGNOSIS — R35.1 BENIGN PROSTATIC HYPERPLASIA WITH NOCTURIA: ICD-10-CM

## 2023-10-19 DIAGNOSIS — N40.1 BENIGN PROSTATIC HYPERPLASIA WITH NOCTURIA: ICD-10-CM

## 2023-10-27 RX ORDER — TAMSULOSIN HYDROCHLORIDE 0.4 MG/1
0.4 CAPSULE ORAL DAILY
Qty: 30 CAPSULE | Refills: 2 | Status: SHIPPED | OUTPATIENT
Start: 2023-10-27

## 2023-11-06 ENCOUNTER — PROCEDURE VISIT (OUTPATIENT)
Dept: PODIATRY | Age: 63
End: 2023-11-06
Payer: MEDICARE

## 2023-11-06 VITALS
DIASTOLIC BLOOD PRESSURE: 74 MMHG | BODY MASS INDEX: 35.44 KG/M2 | SYSTOLIC BLOOD PRESSURE: 126 MMHG | TEMPERATURE: 97.2 F | WEIGHT: 240 LBS

## 2023-11-06 DIAGNOSIS — R20.2 PARESTHESIA: ICD-10-CM

## 2023-11-06 PROCEDURE — 99213 OFFICE O/P EST LOW 20 MIN: CPT | Performed by: PODIATRIST

## 2023-11-06 PROCEDURE — 1123F ACP DISCUSS/DSCN MKR DOCD: CPT | Performed by: PODIATRIST

## 2023-11-06 RX ORDER — GABAPENTIN 300 MG/1
300 CAPSULE ORAL 3 TIMES DAILY
Qty: 270 CAPSULE | Refills: 0 | Status: SHIPPED | OUTPATIENT
Start: 2023-11-06 | End: 2024-02-04

## 2023-11-06 NOTE — PROGRESS NOTES
Mariaelena Calderon : 1960 Sex: male  Age: 70 y.o. Patient was referred by Chris Duvall MD    Chief Complaint   Patient presents with    Toe Pain     Chris Duvall MD    10/26/2023       SUBJECTIVE   . This patient is seen today for several different issues including foot leg and ankle pain. Patient relates that he is seen  Does have lower back issues but his legs feet are tingling sometimes at night sometimes during the day. No trauma noted. Patient denies fever chills infections. off lyrica   Toe Pain   The injury mechanism was an eversion injury. Review of Systems  Const: Denies constitutional symptoms  Musculo: Denies symptoms other than stated above  Skin: Denies symptoms other than stated above       Current Outpatient Medications:     gabapentin (NEURONTIN) 300 MG capsule, Take 1 capsule by mouth 3 times daily for 90 days.  Intended supply: 30 days, Disp: 270 capsule, Rfl: 0    tamsulosin (FLOMAX) 0.4 MG capsule, Take 1 capsule by mouth daily, Disp: 30 capsule, Rfl: 2    DULoxetine (CYMBALTA) 60 MG extended release capsule, Take 1 capsule by mouth daily, Disp: 30 capsule, Rfl: 3    metFORMIN (GLUCOPHAGE-XR) 500 MG extended release tablet, Take 1 tablet by mouth daily, Disp: 30 tablet, Rfl: 3    omeprazole (PRILOSEC) 20 MG delayed release capsule, Take 1 capsule by mouth daily, Disp: 30 capsule, Rfl: 12    tadalafil (CIALIS) 5 MG tablet, Take 1 tablet by mouth daily, Disp: , Rfl:     rosuvastatin (CRESTOR) 10 MG tablet, Take 1 tablet by mouth Daily with supper (Patient not taking: Reported on 2023), Disp: 30 tablet, Rfl: 3  No Known Allergies      Past Medical History:   Diagnosis Date    Disorder of prostate     Dysthymic disorder     GERD (gastroesophageal reflux disease)     Migraine     Tinea unguium     Vitamin D deficiency      Past Surgical History:   Procedure Laterality Date    CHOLECYSTECTOMY, LAPAROSCOPIC      HERNIA REPAIR       No family history on

## 2024-01-08 ENCOUNTER — PROCEDURE VISIT (OUTPATIENT)
Dept: PODIATRY | Age: 64
End: 2024-01-08
Payer: MEDICARE

## 2024-01-08 VITALS
BODY MASS INDEX: 35.44 KG/M2 | TEMPERATURE: 97.8 F | WEIGHT: 240 LBS | SYSTOLIC BLOOD PRESSURE: 126 MMHG | DIASTOLIC BLOOD PRESSURE: 78 MMHG

## 2024-01-08 DIAGNOSIS — M72.2 BILATERAL PLANTAR FASCIITIS: ICD-10-CM

## 2024-01-08 DIAGNOSIS — M62.81 MUSCLE WEAKNESS AFFECTING MOVEMENT OF FOOT: ICD-10-CM

## 2024-01-08 DIAGNOSIS — M25.562 CHRONIC PAIN OF BOTH KNEES: ICD-10-CM

## 2024-01-08 DIAGNOSIS — M25.561 CHRONIC PAIN OF BOTH KNEES: ICD-10-CM

## 2024-01-08 DIAGNOSIS — G25.81 RESTLESS LEG SYNDROME: ICD-10-CM

## 2024-01-08 DIAGNOSIS — R20.2 PARESTHESIA: ICD-10-CM

## 2024-01-08 DIAGNOSIS — M79.675 PAIN IN LEFT TOE(S): ICD-10-CM

## 2024-01-08 DIAGNOSIS — G89.29 CHRONIC PAIN OF BOTH KNEES: ICD-10-CM

## 2024-01-08 DIAGNOSIS — M79.674 PAIN IN TOE OF RIGHT FOOT: ICD-10-CM

## 2024-01-08 DIAGNOSIS — M79.2 NEURITIS: Primary | ICD-10-CM

## 2024-01-08 DIAGNOSIS — L60.0 INGROWN NAIL: ICD-10-CM

## 2024-01-08 DIAGNOSIS — B35.1 TINEA UNGUIUM: ICD-10-CM

## 2024-01-08 PROCEDURE — 1123F ACP DISCUSS/DSCN MKR DOCD: CPT | Performed by: PODIATRIST

## 2024-01-08 PROCEDURE — 99212 OFFICE O/P EST SF 10 MIN: CPT | Performed by: PODIATRIST

## 2024-01-08 NOTE — PROGRESS NOTES
Miles Marquez : 1960 Sex: male  Age: 63 y.o.    Patient was referred by Kashif Armas MD    Chief Complaint   Patient presents with    Toe Pain    Nail Problem     Kashif Armas MD  11/15/2023    Diabetes       SUBJECTIVE   .  This patient is seen today for several different issues including foot leg and ankle pain.  Patient relates that he is seen  Does have lower back issues but his legs feet are tingling sometimes at night sometimes during the day.  No trauma noted.  Patient denies fever chills infections.off lyrica   Toe Pain   The injury mechanism was an eversion injury.   Diabetes      Review of Systems  Const: Denies constitutional symptoms  Musculo: Denies symptoms other than stated above  Skin: Denies symptoms other than stated above       Current Outpatient Medications:     gabapentin (NEURONTIN) 300 MG capsule, Take 1 capsule by mouth 3 times daily for 90 days. Intended supply: 30 days, Disp: 270 capsule, Rfl: 0    tamsulosin (FLOMAX) 0.4 MG capsule, Take 1 capsule by mouth daily, Disp: 30 capsule, Rfl: 2    DULoxetine (CYMBALTA) 60 MG extended release capsule, Take 1 capsule by mouth daily, Disp: 30 capsule, Rfl: 3    metFORMIN (GLUCOPHAGE-XR) 500 MG extended release tablet, Take 1 tablet by mouth daily, Disp: 30 tablet, Rfl: 3    rosuvastatin (CRESTOR) 10 MG tablet, Take 1 tablet by mouth Daily with supper, Disp: 30 tablet, Rfl: 3    omeprazole (PRILOSEC) 20 MG delayed release capsule, Take 1 capsule by mouth daily, Disp: 30 capsule, Rfl: 12    tadalafil (CIALIS) 5 MG tablet, Take 1 tablet by mouth daily, Disp: , Rfl:   No Known Allergies      Past Medical History:   Diagnosis Date    Disorder of prostate     Dysthymic disorder     GERD (gastroesophageal reflux disease)     Migraine     Tinea unguium     Vitamin D deficiency      Past Surgical History:   Procedure Laterality Date    CHOLECYSTECTOMY, LAPAROSCOPIC      HERNIA REPAIR       No family history on file.  Social

## 2024-02-07 DIAGNOSIS — F41.9 ANXIETY AND DEPRESSION: ICD-10-CM

## 2024-02-07 DIAGNOSIS — F32.A ANXIETY AND DEPRESSION: ICD-10-CM

## 2024-02-07 DIAGNOSIS — R20.2 PARESTHESIA: ICD-10-CM

## 2024-02-07 RX ORDER — DULOXETIN HYDROCHLORIDE 60 MG/1
60 CAPSULE, DELAYED RELEASE ORAL DAILY
Qty: 30 CAPSULE | Refills: 3 | Status: SHIPPED | OUTPATIENT
Start: 2024-02-07

## 2024-02-07 RX ORDER — GABAPENTIN 300 MG/1
300 CAPSULE ORAL 3 TIMES DAILY
Qty: 270 CAPSULE | Refills: 0 | Status: SHIPPED | OUTPATIENT
Start: 2024-02-07 | End: 2024-05-07

## 2024-03-18 ENCOUNTER — PROCEDURE VISIT (OUTPATIENT)
Dept: PODIATRY | Age: 64
End: 2024-03-18
Payer: MEDICARE

## 2024-03-18 VITALS
SYSTOLIC BLOOD PRESSURE: 128 MMHG | DIASTOLIC BLOOD PRESSURE: 74 MMHG | TEMPERATURE: 98.4 F | BODY MASS INDEX: 35.44 KG/M2 | WEIGHT: 240 LBS

## 2024-03-18 DIAGNOSIS — M79.675 PAIN IN LEFT TOE(S): ICD-10-CM

## 2024-03-18 DIAGNOSIS — M25.562 CHRONIC PAIN OF BOTH KNEES: ICD-10-CM

## 2024-03-18 DIAGNOSIS — M62.81 MUSCLE WEAKNESS AFFECTING MOVEMENT OF FOOT: ICD-10-CM

## 2024-03-18 DIAGNOSIS — M72.2 BILATERAL PLANTAR FASCIITIS: ICD-10-CM

## 2024-03-18 DIAGNOSIS — M79.2 NEURITIS: Primary | ICD-10-CM

## 2024-03-18 DIAGNOSIS — L60.0 INGROWN NAIL: ICD-10-CM

## 2024-03-18 DIAGNOSIS — G25.81 RESTLESS LEG SYNDROME: ICD-10-CM

## 2024-03-18 DIAGNOSIS — R20.2 PARESTHESIA: ICD-10-CM

## 2024-03-18 DIAGNOSIS — B35.1 TINEA UNGUIUM: ICD-10-CM

## 2024-03-18 DIAGNOSIS — M25.561 CHRONIC PAIN OF BOTH KNEES: ICD-10-CM

## 2024-03-18 DIAGNOSIS — G89.29 CHRONIC PAIN OF BOTH KNEES: ICD-10-CM

## 2024-03-18 PROCEDURE — 1123F ACP DISCUSS/DSCN MKR DOCD: CPT | Performed by: PODIATRIST

## 2024-03-18 PROCEDURE — 99213 OFFICE O/P EST LOW 20 MIN: CPT | Performed by: PODIATRIST

## 2024-03-18 RX ORDER — GABAPENTIN 300 MG/1
300 CAPSULE ORAL 3 TIMES DAILY
Qty: 270 CAPSULE | Refills: 0 | Status: SHIPPED | OUTPATIENT
Start: 2024-03-18 | End: 2024-06-16

## 2024-03-18 NOTE — PROGRESS NOTES
flexion:  5/5             Vascular: pulses  dp  pt    Capillary Refill Time:   Hair growth  Skin:    Edema:    Neurologic:      Musculoskeletal/ Orthopedic examination:    NAIL bilateral hallux nail slightly incurvated nails on medial and lateral borders causing pain and pressure nail is incurvated and the skin  Web space          Assessment and Plan: on gabapentin      Pt seen for  chronic pain   oarrs report reviewed I have reviewed current medication  and prior noted I believe the need for refill is warranted  I have reviewed oarrs report and found no drug seeking behavior   I have discussed all side effects and narcotic policy   with pt  pt understands and agrees with treatment  a follow up  appointment is made   today I debrided both borders no infection noted continue to monitor  Miles was seen today for ingrown toenail, toe pain and nail problem.    Diagnoses and all orders for this visit:    Neuritis    Paresthesia  -     gabapentin (NEURONTIN) 300 MG capsule; Take 1 capsule by mouth 3 times daily for 90 days. Intended supply: 30 days    Muscle weakness affecting movement of foot    Restless leg syndrome    Chronic pain of both knees    Bilateral plantar fasciitis    Pain in left toe(s)    Tinea unguium    Ingrown nail        No follow-ups on file.      Seen By:  Kaiser Mosley DPM      Document was created using voice recognition software.  Note was reviewed, however may contain grammatical errors.

## 2024-04-23 RX ORDER — GABAPENTIN 800 MG/1
800 TABLET ORAL 2 TIMES DAILY
Qty: 120 TABLET | Refills: 0 | Status: SHIPPED | OUTPATIENT
Start: 2024-04-23 | End: 2024-06-22

## 2024-06-24 ENCOUNTER — PROCEDURE VISIT (OUTPATIENT)
Dept: PODIATRY | Age: 64
End: 2024-06-24
Payer: MEDICARE

## 2024-06-24 VITALS
WEIGHT: 240 LBS | SYSTOLIC BLOOD PRESSURE: 128 MMHG | BODY MASS INDEX: 35.44 KG/M2 | DIASTOLIC BLOOD PRESSURE: 72 MMHG | TEMPERATURE: 98.2 F

## 2024-06-24 DIAGNOSIS — M62.81 MUSCLE WEAKNESS AFFECTING MOVEMENT OF FOOT: ICD-10-CM

## 2024-06-24 DIAGNOSIS — B35.1 TINEA UNGUIUM: ICD-10-CM

## 2024-06-24 DIAGNOSIS — L60.0 INGROWN NAIL: ICD-10-CM

## 2024-06-24 DIAGNOSIS — M25.561 CHRONIC PAIN OF BOTH KNEES: ICD-10-CM

## 2024-06-24 DIAGNOSIS — M25.562 CHRONIC PAIN OF BOTH KNEES: ICD-10-CM

## 2024-06-24 DIAGNOSIS — R20.2 PARESTHESIA: ICD-10-CM

## 2024-06-24 DIAGNOSIS — G25.81 RESTLESS LEG SYNDROME: ICD-10-CM

## 2024-06-24 DIAGNOSIS — G89.29 CHRONIC PAIN OF BOTH KNEES: ICD-10-CM

## 2024-06-24 DIAGNOSIS — M79.2 NEURITIS: Primary | ICD-10-CM

## 2024-06-24 PROCEDURE — 1123F ACP DISCUSS/DSCN MKR DOCD: CPT | Performed by: PODIATRIST

## 2024-06-24 PROCEDURE — 99213 OFFICE O/P EST LOW 20 MIN: CPT | Performed by: PODIATRIST

## 2024-06-24 RX ORDER — GABAPENTIN 800 MG/1
800 TABLET ORAL 2 TIMES DAILY
Qty: 120 TABLET | Refills: 0 | Status: SHIPPED | OUTPATIENT
Start: 2024-06-24 | End: 2024-08-23

## 2024-06-24 NOTE — PROGRESS NOTES
Violence: Not on file   Housing Stability: Unknown (5/11/2023)    Housing Stability Vital Sign     Unable to Pay for Housing in the Last Year: Not on file     Number of Places Lived in the Last Year: Not on file     Unstable Housing in the Last Year: No       Vitals:    06/24/24 0708   BP: 128/72   Temp: 98.2 °F (36.8 °C)   TempSrc: Temporal   Weight: 108.9 kg (240 lb)       Focused Lower Extremity Physical Exam:  Vitals:    06/24/24 0708   BP: 128/72   Temp: 98.2 °F (36.8 °C)        Foot Exam    General  General Appearance: appears stated age and healthy   Orientation: alert and oriented to person, place, and time       Right Foot/Ankle     Inspection and Palpation  Tenderness: bony tenderness and metatarsals   Skin Exam: skin intact;     Neurovascular  Dorsalis pedis: 3+  Posterior tibial: 3+  Saphenous nerve sensation: normal  Tibial nerve sensation: normal  Superficial peroneal nerve sensation: normal  Deep peroneal nerve sensation: diminished  Sural nerve sensation: diminished  Achilles reflex: 2+  Babinski reflex: 2+    Muscle Strength  Ankle dorsiflexion: 5  Ankle plantar flexion: 5  Ankle inversion: 5  Ankle eversion: 5  Great toe extension: 5  Great toe flexion: 5      Left Foot/Ankle      Neurovascular  Dorsalis pedis: 3+  Posterior tibial: 3+  Saphenous nerve sensation: diminished  Tibial nerve sensation: diminished  Superficial peroneal nerve sensation: normal  Deep peroneal nerve sensation: normal  Sural nerve sensation: normal  Achilles reflex: 2+  Babinski reflex: 2+    Muscle Strength  Ankle dorsiflexion: 5  Ankle plantar flexion: 5  Ankle inversion: 5  Ankle eversion: 5  Great toe extension: 5  Great toe flexion: 5           Right Ankle Exam     Muscle Strength   Dorsiflexion:  5/5  Plantar flexion:  5/5       Left Ankle Exam     Muscle Strength   The patient has normal left ankle strength.  Dorsiflexion:  5/5   Plantar flexion:  5/5             Vascular: pulses  dp  pt    Capillary Refill Time:

## 2024-08-16 DIAGNOSIS — F32.A ANXIETY AND DEPRESSION: ICD-10-CM

## 2024-08-16 DIAGNOSIS — F41.9 ANXIETY AND DEPRESSION: ICD-10-CM

## 2024-08-16 RX ORDER — DULOXETIN HYDROCHLORIDE 60 MG/1
60 CAPSULE, DELAYED RELEASE ORAL DAILY
Qty: 30 CAPSULE | Refills: 3 | Status: SHIPPED | OUTPATIENT
Start: 2024-08-16

## 2024-09-16 ENCOUNTER — PROCEDURE VISIT (OUTPATIENT)
Dept: PODIATRY | Age: 64
End: 2024-09-16
Payer: MEDICARE

## 2024-09-16 VITALS
DIASTOLIC BLOOD PRESSURE: 74 MMHG | BODY MASS INDEX: 36.03 KG/M2 | SYSTOLIC BLOOD PRESSURE: 132 MMHG | WEIGHT: 244 LBS | TEMPERATURE: 97.4 F

## 2024-09-16 DIAGNOSIS — G89.29 CHRONIC PAIN OF BOTH KNEES: ICD-10-CM

## 2024-09-16 DIAGNOSIS — G25.81 RESTLESS LEG SYNDROME: ICD-10-CM

## 2024-09-16 DIAGNOSIS — M62.81 MUSCLE WEAKNESS AFFECTING MOVEMENT OF FOOT: ICD-10-CM

## 2024-09-16 DIAGNOSIS — K21.9 GASTROESOPHAGEAL REFLUX DISEASE, UNSPECIFIED WHETHER ESOPHAGITIS PRESENT: ICD-10-CM

## 2024-09-16 DIAGNOSIS — F41.9 ANXIETY AND DEPRESSION: ICD-10-CM

## 2024-09-16 DIAGNOSIS — R20.2 PARESTHESIA: ICD-10-CM

## 2024-09-16 DIAGNOSIS — M25.561 CHRONIC PAIN OF BOTH KNEES: ICD-10-CM

## 2024-09-16 DIAGNOSIS — F32.A ANXIETY AND DEPRESSION: ICD-10-CM

## 2024-09-16 DIAGNOSIS — M79.2 NEURITIS: Primary | ICD-10-CM

## 2024-09-16 DIAGNOSIS — M25.562 CHRONIC PAIN OF BOTH KNEES: ICD-10-CM

## 2024-09-16 PROCEDURE — 1123F ACP DISCUSS/DSCN MKR DOCD: CPT | Performed by: PODIATRIST

## 2024-09-16 PROCEDURE — 99213 OFFICE O/P EST LOW 20 MIN: CPT | Performed by: PODIATRIST

## 2024-09-16 RX ORDER — DULOXETIN HYDROCHLORIDE 60 MG/1
60 CAPSULE, DELAYED RELEASE ORAL DAILY
Qty: 30 CAPSULE | Refills: 3 | Status: SHIPPED | OUTPATIENT
Start: 2024-09-16

## 2024-09-16 RX ORDER — GABAPENTIN 800 MG/1
800 TABLET ORAL DAILY
Qty: 60 TABLET | Refills: 0 | Status: SHIPPED | OUTPATIENT
Start: 2024-09-16 | End: 2024-11-15

## 2024-11-25 ENCOUNTER — PROCEDURE VISIT (OUTPATIENT)
Dept: PODIATRY | Age: 64
End: 2024-11-25
Payer: MEDICARE

## 2024-11-25 VITALS
BODY MASS INDEX: 36.03 KG/M2 | WEIGHT: 244 LBS | DIASTOLIC BLOOD PRESSURE: 74 MMHG | OXYGEN SATURATION: 95 % | TEMPERATURE: 98 F | HEART RATE: 85 BPM | SYSTOLIC BLOOD PRESSURE: 138 MMHG

## 2024-11-25 DIAGNOSIS — M79.2 NEURITIS: ICD-10-CM

## 2024-11-25 DIAGNOSIS — M54.41 CHRONIC BILATERAL LOW BACK PAIN WITH BILATERAL SCIATICA: Primary | ICD-10-CM

## 2024-11-25 DIAGNOSIS — R20.2 PARESTHESIA: ICD-10-CM

## 2024-11-25 DIAGNOSIS — M62.81 MUSCLE WEAKNESS AFFECTING MOVEMENT OF FOOT: ICD-10-CM

## 2024-11-25 DIAGNOSIS — M54.42 CHRONIC BILATERAL LOW BACK PAIN WITH BILATERAL SCIATICA: Primary | ICD-10-CM

## 2024-11-25 DIAGNOSIS — G89.29 CHRONIC BILATERAL LOW BACK PAIN WITH BILATERAL SCIATICA: Primary | ICD-10-CM

## 2024-11-25 DIAGNOSIS — G25.81 RESTLESS LEG SYNDROME: ICD-10-CM

## 2024-11-25 DIAGNOSIS — B35.1 TINEA UNGUIUM: ICD-10-CM

## 2024-11-25 PROCEDURE — 1123F ACP DISCUSS/DSCN MKR DOCD: CPT | Performed by: PODIATRIST

## 2024-11-25 PROCEDURE — 99213 OFFICE O/P EST LOW 20 MIN: CPT | Performed by: PODIATRIST

## 2024-11-25 PROCEDURE — 1159F MED LIST DOCD IN RCRD: CPT | Performed by: PODIATRIST

## 2024-11-25 RX ORDER — GABAPENTIN 400 MG/1
400 CAPSULE ORAL 2 TIMES DAILY
Qty: 180 CAPSULE | Refills: 0 | Status: SHIPPED | OUTPATIENT
Start: 2024-11-25 | End: 2025-02-23

## 2024-11-25 RX ORDER — ROPINIROLE 0.25 MG/1
0.25 TABLET, FILM COATED ORAL NIGHTLY
Qty: 90 TABLET | Refills: 0 | Status: SHIPPED | OUTPATIENT
Start: 2024-11-25 | End: 2025-02-23

## 2024-11-25 ASSESSMENT — ENCOUNTER SYMPTOMS: BACK PAIN: 1

## 2024-11-25 NOTE — PROGRESS NOTES
24  Miles Marquez : 1960 Sex: male  Age: 64 y.o.    Patient was referred by Kashif Armas MD    Chief Complaint   Patient presents with    Toe Pain    Nail Problem     Kashif Armas MD  10/10/24    Foot Pain     Gabapentin and Cymbalta ck  up        SUBJECTIVE patient is seen today for fungus toenails on the right side restless leg syndrome and neuropathy.  Patient relates that his back is really been acting up the sciatica on both sides had been really painful neuropathy has increased.  Patient relates that he did see [facility] was in therapy but this really has not helped.  Patient would like a second opinion.  Toe Pain     Foot Pain       Review of Systems   Musculoskeletal:  Positive for arthralgias and back pain.     Const: Denies constitutional symptoms  Musculo: Denies symptoms other than stated above  Skin: Denies symptoms other than stated above       Current Outpatient Medications:     gabapentin (NEURONTIN) 400 MG capsule, Take 1 capsule by mouth in the morning and at bedtime for 90 days., Disp: 180 capsule, Rfl: 0    rOPINIRole (REQUIP) 0.25 MG tablet, Take 1 tablet by mouth nightly, Disp: 90 tablet, Rfl: 0    DULoxetine (CYMBALTA) 60 MG extended release capsule, Take 1 capsule by mouth daily, Disp: 30 capsule, Rfl: 3    omeprazole (PRILOSEC) 20 MG delayed release capsule, Take 1 capsule by mouth daily, Disp: 30 capsule, Rfl: 12    tamsulosin (FLOMAX) 0.4 MG capsule, Take 1 capsule by mouth daily, Disp: 30 capsule, Rfl: 2    metFORMIN (GLUCOPHAGE-XR) 500 MG extended release tablet, Take 1 tablet by mouth daily, Disp: 30 tablet, Rfl: 3    tadalafil (CIALIS) 5 MG tablet, Take 1 tablet by mouth daily, Disp: , Rfl:   No Known Allergies    Past Medical History:   Diagnosis Date    Disorder of prostate     Dysthymic disorder     GERD (gastroesophageal reflux disease)     Migraine     Tinea unguium     Vitamin D deficiency      Past Surgical History:   Procedure

## 2024-12-20 ENCOUNTER — OFFICE VISIT (OUTPATIENT)
Dept: PAIN MANAGEMENT | Age: 64
End: 2024-12-20
Payer: MEDICARE

## 2024-12-20 VITALS
RESPIRATION RATE: 16 BRPM | HEART RATE: 60 BPM | BODY MASS INDEX: 34.93 KG/M2 | WEIGHT: 244 LBS | OXYGEN SATURATION: 95 % | DIASTOLIC BLOOD PRESSURE: 81 MMHG | TEMPERATURE: 97.9 F | HEIGHT: 70 IN | SYSTOLIC BLOOD PRESSURE: 131 MMHG

## 2024-12-20 DIAGNOSIS — M47.816 LUMBAR SPONDYLOSIS: Primary | ICD-10-CM

## 2024-12-20 DIAGNOSIS — R20.2 PARESTHESIA OF LEFT LEG: ICD-10-CM

## 2024-12-20 DIAGNOSIS — M48.061 NEUROFORAMINAL STENOSIS OF LUMBAR SPINE: ICD-10-CM

## 2024-12-20 DIAGNOSIS — M54.16 LUMBAR RADICULOPATHY: ICD-10-CM

## 2024-12-20 PROCEDURE — 1159F MED LIST DOCD IN RCRD: CPT | Performed by: STUDENT IN AN ORGANIZED HEALTH CARE EDUCATION/TRAINING PROGRAM

## 2024-12-20 PROCEDURE — 99204 OFFICE O/P NEW MOD 45 MIN: CPT | Performed by: STUDENT IN AN ORGANIZED HEALTH CARE EDUCATION/TRAINING PROGRAM

## 2024-12-20 PROCEDURE — 1160F RVW MEDS BY RX/DR IN RCRD: CPT | Performed by: STUDENT IN AN ORGANIZED HEALTH CARE EDUCATION/TRAINING PROGRAM

## 2024-12-20 PROCEDURE — 1123F ACP DISCUSS/DSCN MKR DOCD: CPT | Performed by: STUDENT IN AN ORGANIZED HEALTH CARE EDUCATION/TRAINING PROGRAM

## 2024-12-20 RX ORDER — GABAPENTIN 100 MG/1
CAPSULE ORAL
Qty: 84 CAPSULE | Refills: 0 | Status: SHIPPED | OUTPATIENT
Start: 2024-12-20 | End: 2025-01-10

## 2024-12-20 RX ORDER — PREGABALIN 25 MG/1
CAPSULE ORAL
Qty: 69 CAPSULE | Refills: 0 | Status: SHIPPED | OUTPATIENT
Start: 2024-12-20 | End: 2025-01-19

## 2024-12-20 NOTE — PROGRESS NOTES
Patient:  Miles Marquez,  1960  Date of Service:  24      Patient presents to [facility] with complaints of lower back pain     Pain:  Location: lower left back  Inciting Factor: n/a  Duration: 2 years  Description: constant  Quality: sharp and stabbing.    Level (worst): 8  Radiation:  yes - left leg  Numbness/Tingling: yes - both feet  Aggravating factors: sitting, lying down.   Alleviating factors: bio freeze.    Blood Thinners/Anticoagulation:  no     Herbal Supplements: no    Medications:    NSAID's :   no  Tylenol: Yes   Patches/Gels:    no  Membrane stabilizers :   Current -  gabapentin (NEURONTIN)  Previous - gabapentin (NEURONTIN)  Opioids :   Current -  no   Previous -   no   Muscle Relaxants:   no  Steroids: no   Benzodiazepines:  no  Anti-depres/Anti-Pscyh: Duloxetine (CYMBALTA)   Adjuvants or Others : no      Previous treatments:    Physical Therapy : Yes  [facility] 3/4 months ineffective  Chiropractic treatment: No   TENS Unit: No   Previous Pain Physicians: yes - [facility]   Previous Procedure: no     Do you want someone present when the provider examines you? No    /81   Pulse 60   Temp 97.9 °F (36.6 °C) (Infrared)   Resp 16   Ht 1.778 m (5' 10\")   Wt 110.7 kg (244 lb)   SpO2 95%   BMI 35.01 kg/m²     No LMP for male patient.

## 2024-12-20 NOTE — PROGRESS NOTES
[facility] - Pain Medicine  [address] [place] [address]  Pain Medicine Consult Note    Patient:  Miles Marquez,  1960  Date of Service:  24  Requesting Physician:  Kaiser Mosley DPM  Chief Complaint: Consultation (Left lower back )      HISTORY OF PRESENT ILLNESS:      Mr. Miles Marquez is a 64 y.o. male presented today for evaluation of  low back pain, left leg that has been ongoing for the past 2 years     He   has a past medical history of  Dysthymic disorder, GERD, Migraine  .       Blood Thinners/Anticoagulation: no  Herbal Supplements: no  Pertinent Allergies: no  Diabetic: no  Bowel/Bladder Incontinence: no  Relevant Surgeries: No   Relevant Recent procedures: No     Pain:  Location: lower left back  Inciting Factor: n/a  Duration: 2 years  Description: constant  Quality: sharp and stabbing.    Level (worst): 8  Radiation:  yes - left leg  Numbness/Tingling: yes - both feet  Aggravating factors: sitting, lying down.   Alleviating factors: bio freeze.     Blood Thinners/Anticoagulation:  no                Herbal Supplements: no     Medications:    NSAID's :   no  Tylenol: Yes   Patches/Gels:    no  Membrane stabilizers :   Current -  gabapentin 400 mg BID   Previous - no  Opioids :   Current -  no   Previous -   no   Muscle Relaxants:   no  Steroids: no   Benzodiazepines:  no  Anti-depres/Anti-Pscyh: Duloxetine 60 mg   Adjuvants or Others : Requip 0.25 mg       Previous treatments:    Physical Therapy : Yes  [facility] 3/4 months ineffective  Chiropractic treatment: No   TENS Unit: No   Previous Pain Physicians: yes - [facility] Summer 2024  Previous Procedure: no     Current Medications:   DULoxetine, gabapentin, metFORMIN, omeprazole, pregabalin, rOPINIRole, tadalafil, and tamsulosin     Social History:  Work Hx: employed   Currently in Litigation: denies    H/O Smoking: denies   H/O alcohol abuse : denies   H/O Illicit drug use :

## 2025-01-13 ENCOUNTER — HOSPITAL ENCOUNTER (OUTPATIENT)
Dept: MRI IMAGING | Age: 65
Discharge: HOME | End: 2025-01-15
Attending: STUDENT IN AN ORGANIZED HEALTH CARE EDUCATION/TRAINING PROGRAM
Payer: MEDICARE

## 2025-01-13 DIAGNOSIS — M54.16 LUMBAR RADICULOPATHY: ICD-10-CM

## 2025-01-13 DIAGNOSIS — M47.816 LUMBAR SPONDYLOSIS: ICD-10-CM

## 2025-01-13 DIAGNOSIS — M48.061 NEUROFORAMINAL STENOSIS OF LUMBAR SPINE: ICD-10-CM

## 2025-01-13 PROCEDURE — 72148 MRI LUMBAR SPINE W/O DYE: CPT

## 2025-02-03 ENCOUNTER — PROCEDURE VISIT (OUTPATIENT)
Dept: PODIATRY | Age: 65
End: 2025-02-03
Payer: MEDICARE

## 2025-02-03 ENCOUNTER — OFFICE VISIT (OUTPATIENT)
Dept: PAIN MANAGEMENT | Age: 65
End: 2025-02-03
Payer: MEDICARE

## 2025-02-03 VITALS
OXYGEN SATURATION: 93 % | DIASTOLIC BLOOD PRESSURE: 78 MMHG | WEIGHT: 244 LBS | RESPIRATION RATE: 16 BRPM | HEIGHT: 70 IN | SYSTOLIC BLOOD PRESSURE: 126 MMHG | HEART RATE: 70 BPM | TEMPERATURE: 98.3 F | BODY MASS INDEX: 34.93 KG/M2

## 2025-02-03 VITALS
SYSTOLIC BLOOD PRESSURE: 126 MMHG | WEIGHT: 244 LBS | BODY MASS INDEX: 35.01 KG/M2 | DIASTOLIC BLOOD PRESSURE: 78 MMHG | TEMPERATURE: 97.3 F

## 2025-02-03 DIAGNOSIS — M54.41 CHRONIC BILATERAL LOW BACK PAIN WITH BILATERAL SCIATICA: Primary | ICD-10-CM

## 2025-02-03 DIAGNOSIS — M54.16 LUMBAR RADICULOPATHY: ICD-10-CM

## 2025-02-03 DIAGNOSIS — R20.2 PARESTHESIA: ICD-10-CM

## 2025-02-03 DIAGNOSIS — M54.42 CHRONIC BILATERAL LOW BACK PAIN WITH BILATERAL SCIATICA: Primary | ICD-10-CM

## 2025-02-03 DIAGNOSIS — G89.29 CHRONIC BILATERAL LOW BACK PAIN WITH BILATERAL SCIATICA: Primary | ICD-10-CM

## 2025-02-03 DIAGNOSIS — M47.816 LUMBAR SPONDYLOSIS: ICD-10-CM

## 2025-02-03 DIAGNOSIS — M48.061 NEUROFORAMINAL STENOSIS OF LUMBAR SPINE: ICD-10-CM

## 2025-02-03 DIAGNOSIS — B35.1 TINEA UNGUIUM: ICD-10-CM

## 2025-02-03 DIAGNOSIS — M79.2 NEURITIS: ICD-10-CM

## 2025-02-03 DIAGNOSIS — M62.81 MUSCLE WEAKNESS AFFECTING MOVEMENT OF FOOT: ICD-10-CM

## 2025-02-03 DIAGNOSIS — G25.81 RESTLESS LEG SYNDROME: ICD-10-CM

## 2025-02-03 PROCEDURE — 1160F RVW MEDS BY RX/DR IN RCRD: CPT | Performed by: STUDENT IN AN ORGANIZED HEALTH CARE EDUCATION/TRAINING PROGRAM

## 2025-02-03 PROCEDURE — 1159F MED LIST DOCD IN RCRD: CPT | Performed by: STUDENT IN AN ORGANIZED HEALTH CARE EDUCATION/TRAINING PROGRAM

## 2025-02-03 PROCEDURE — 1123F ACP DISCUSS/DSCN MKR DOCD: CPT | Performed by: STUDENT IN AN ORGANIZED HEALTH CARE EDUCATION/TRAINING PROGRAM

## 2025-02-03 PROCEDURE — 1159F MED LIST DOCD IN RCRD: CPT | Performed by: PODIATRIST

## 2025-02-03 PROCEDURE — 99213 OFFICE O/P EST LOW 20 MIN: CPT | Performed by: PODIATRIST

## 2025-02-03 PROCEDURE — 1123F ACP DISCUSS/DSCN MKR DOCD: CPT | Performed by: PODIATRIST

## 2025-02-03 PROCEDURE — 99214 OFFICE O/P EST MOD 30 MIN: CPT | Performed by: STUDENT IN AN ORGANIZED HEALTH CARE EDUCATION/TRAINING PROGRAM

## 2025-02-03 RX ORDER — SODIUM CHLORIDE 0.9 % (FLUSH) 0.9 %
5-40 SYRINGE (ML) INJECTION PRN
OUTPATIENT
Start: 2025-02-03

## 2025-02-03 RX ORDER — SODIUM CHLORIDE 9 MG/ML
INJECTION, SOLUTION INTRAVENOUS PRN
OUTPATIENT
Start: 2025-02-03

## 2025-02-03 RX ORDER — PREGABALIN 25 MG/1
50 CAPSULE ORAL 2 TIMES DAILY
Qty: 120 CAPSULE | Refills: 0 | Status: SHIPPED | OUTPATIENT
Start: 2025-02-03 | End: 2025-03-05

## 2025-02-03 RX ORDER — BUPROPION HYDROCHLORIDE 300 MG/1
300 TABLET ORAL EVERY MORNING
COMMUNITY

## 2025-02-03 RX ORDER — SODIUM CHLORIDE 0.9 % (FLUSH) 0.9 %
5-40 SYRINGE (ML) INJECTION EVERY 12 HOURS SCHEDULED
OUTPATIENT
Start: 2025-02-03

## 2025-02-03 RX ORDER — ROPINIROLE 0.25 MG/1
0.25 TABLET, FILM COATED ORAL NIGHTLY
Qty: 90 TABLET | Refills: 0 | Status: SHIPPED | OUTPATIENT
Start: 2025-02-03 | End: 2025-05-04

## 2025-02-03 ASSESSMENT — ENCOUNTER SYMPTOMS: BACK PAIN: 1

## 2025-02-03 NOTE — PROGRESS NOTES
[facility] - Pain Medicine  [address] [place] [address]    Pain Medicine Follow Up Note      Miles Marquez     Date of Visit:  2/3/2025    CC:  Patient presents for follow up   Chief Complaint   Patient presents with    Follow-up     Left leg       HPI:    Pain is unchanged.  Medication side effects:none.   Recent interventional procedures:none. .  Blood Thinners/Anticoagulation: no  Herbal Supplements: no  Pertinent Allergies: no  Diabetic: no  Bowel/Bladder Incontinence: no    Previous Plan:  HEP  MRI L spine - done  Gabapentin - now OFF  Lyrica - now at 25 mg TID     Interval Changes:  Has done well with recent transition from Gabapentin to Lyrica  No sig s/e from lyrica  Still with low back and left lower extremity symptoms    Procedures:        Previous Treatments:   Tylenol, gabapentin, duloxetine, Requip, physical therapy.    Potential Aberrant Drug-Related Behavior:  no      Imaging/Studies:     XRAY:  XR LUMBAR SPINE (2-3 VIEWS) 01/10/2023   Mild multilevel degenerative disc disease and degenerative facet arthropathy  at L5-S1.  No fracture or dislocation.  Normal soft tissues.     Impression  Mild multilevel degenerative spondylosis.        XR KNEE RIGHT (3 VIEWS) 05/03/2022   Mild patellar spurring.        XR KNEE LEFT (3 VIEWS) 05/03/2022   Unremarkable left knee.    MRI:  MRI LUMBAR SPINE WO CONTRAST 01/13/2025    Narrative  EXAMINATION:  MRI OF THE LUMBAR SPINE WITHOUT CONTRAST, 1/13/2025 3:05 pm    TECHNIQUE:  Multiplanar multisequence MRI of the lumbar spine was performed without the  administration of intravenous contrast.    COMPARISON:  None.    HISTORY:  ORDERING SYSTEM PROVIDED HISTORY: Lumbar spondylosis    FINDINGS:  BONES/ALIGNMENT: There is normal alignment of the spine. The vertebral body  heights are maintained. The bone marrow signal appears unremarkable.    SPINAL CORD: The conus terminates normally.    SOFT TISSUES: No paraspinal mass

## 2025-02-03 NOTE — PROGRESS NOTES
Miles Marquez : 1960 Sex: male  Age: 64 y.o.    Patient was referred by Kashif Armas MD    Chief Complaint   Patient presents with    Toe Pain     Kashif Armas MD  2/3/25    Diabetes    Foot Pain       SUBJECTIVE patient is seen today for fungus toenails on the right side restless leg syndrome and neuropathy.on wellbutrin  off cymbalta  off gabapentin    Toe Pain     Foot Pain     Diabetes      Review of Systems   Musculoskeletal:  Positive for arthralgias and back pain.     Const: Denies constitutional symptoms  Musculo: Denies symptoms other than stated above  Skin: Denies symptoms other than stated above       Current Outpatient Medications:     pregabalin (LYRICA) 25 MG capsule, Take 2 capsules by mouth 2 times daily for 30 days. Max Daily Amount: 100 mg, Disp: 120 capsule, Rfl: 0    buPROPion (WELLBUTRIN XL) 300 MG extended release tablet, Take 1 tablet by mouth every morning, Disp: , Rfl:     rOPINIRole (REQUIP) 0.25 MG tablet, Take 1 tablet by mouth nightly, Disp: 90 tablet, Rfl: 0    omeprazole (PRILOSEC) 20 MG delayed release capsule, Take 1 capsule by mouth daily, Disp: 30 capsule, Rfl: 12    tamsulosin (FLOMAX) 0.4 MG capsule, Take 1 capsule by mouth daily, Disp: 30 capsule, Rfl: 2    metFORMIN (GLUCOPHAGE-XR) 500 MG extended release tablet, Take 1 tablet by mouth daily, Disp: 30 tablet, Rfl: 3    tadalafil (CIALIS) 5 MG tablet, Take 1 tablet by mouth daily, Disp: , Rfl:   No Known Allergies    Past Medical History:   Diagnosis Date    Disorder of prostate     Dysthymic disorder     GERD (gastroesophageal reflux disease)     Migraine     Tinea unguium     Vitamin D deficiency      Past Surgical History:   Procedure Laterality Date    CHOLECYSTECTOMY, LAPAROSCOPIC      HERNIA REPAIR       No family history on file.  Social History     Socioeconomic History    Marital status:      Spouse name: Not on file    Number of children: Not on file    Years of education: Not

## 2025-02-03 NOTE — PROGRESS NOTES
Miles Marquez presents to the [facility] on 2/3/2025. Miles is complaining of pain left leg. The pain is constant. The pain is described as aching, throbbing, shooting, stabbing, dull, and burning. Pain is rated on his best day at a 4, on his worst day at a 10, and on average at a 6 on the VAS scale. He took his last dose of Lyrica today.     Any procedures since your last visit: No,     Pacemaker or defibrillator: No    He is not on NSAIDS and is not on anticoagulation medications     Do you want someone present when the provider examines you? No    Medication Contract and Consent for Opioid Use Documents Filed        No documents found                    /78   Pulse 70   Temp 98.3 °F (36.8 °C) (Infrared)   Resp 16   Ht 1.778 m (5' 10\")   Wt 110.7 kg (244 lb)   SpO2 93%   BMI 35.01 kg/m²      No LMP for male patient.

## 2025-02-17 ENCOUNTER — TELEPHONE (OUTPATIENT)
Dept: PAIN MANAGEMENT | Age: 65
End: 2025-02-17

## 2025-02-17 DIAGNOSIS — M54.16 LUMBAR RADICULOPATHY: ICD-10-CM

## 2025-02-17 NOTE — TELEPHONE ENCOUNTER
Call to Miles Marquez that procedure was scheduled for 02/25/2025 and that [facility] should call him a few days before for the pre op call and between 2:00 PM and 4:00 PM  the business day before with the arrival time. Instructed Miles to hold ibuprofen for 24 hours, Celebrex, Mobic, and naprosyn for 4 days and any aspirin containing products, CoQ 10, or fish oil for 7 days. Instructed to call office back if any questions. Miles verbalized understanding.    Electronically signed by Usha Carver RN on 2/17/2025 at 11:25 AM

## 2025-02-18 RX ORDER — ACETAMINOPHEN 160 MG
4000 TABLET,DISINTEGRATING ORAL DAILY
COMMUNITY

## 2025-02-18 RX ORDER — NAPROXEN SODIUM 220 MG/1
220 TABLET, FILM COATED ORAL PRN
COMMUNITY

## 2025-02-18 NOTE — PROGRESS NOTES
[facility] PAIN MANAGEMENT  INSTRUCTIONS  ...........................................................................................................................................     [x] Parking the day of Surgery is located in the Main Entrance lot.  Upon entering the door, make immediate right into the surgery reception room    [x]  Bring photo ID and insurance card     [x] You may have a light breakfast day of procedure    [x]  Wear loose comfortable clothing    [x]  Please follow instructions for medications as given per Dr's office    [x] You can expect a call the business day prior to procedure to notify you of your arrival time     [x] Please arrange for     []  Other instructions

## 2025-02-25 ENCOUNTER — HOSPITAL ENCOUNTER (OUTPATIENT)
Dept: GENERAL RADIOLOGY | Age: 65
Setting detail: OUTPATIENT SURGERY
Discharge: HOME | End: 2025-02-27
Attending: STUDENT IN AN ORGANIZED HEALTH CARE EDUCATION/TRAINING PROGRAM
Payer: MEDICARE

## 2025-02-25 ENCOUNTER — HOSPITAL ENCOUNTER (OUTPATIENT)
Age: 65
Setting detail: OUTPATIENT SURGERY
Discharge: HOME | End: 2025-02-25
Attending: STUDENT IN AN ORGANIZED HEALTH CARE EDUCATION/TRAINING PROGRAM | Admitting: STUDENT IN AN ORGANIZED HEALTH CARE EDUCATION/TRAINING PROGRAM
Payer: MEDICARE

## 2025-02-25 VITALS
OXYGEN SATURATION: 95 % | DIASTOLIC BLOOD PRESSURE: 78 MMHG | BODY MASS INDEX: 36.94 KG/M2 | RESPIRATION RATE: 20 BRPM | HEART RATE: 65 BPM | HEIGHT: 70 IN | WEIGHT: 258 LBS | SYSTOLIC BLOOD PRESSURE: 129 MMHG | TEMPERATURE: 98 F

## 2025-02-25 DIAGNOSIS — M47.816 LUMBAR SPONDYLOSIS: ICD-10-CM

## 2025-02-25 DIAGNOSIS — R52 PAIN MANAGEMENT: ICD-10-CM

## 2025-02-25 DIAGNOSIS — M48.061 NEUROFORAMINAL STENOSIS OF LUMBAR SPINE: ICD-10-CM

## 2025-02-25 DIAGNOSIS — M54.16 LUMBAR RADICULOPATHY: ICD-10-CM

## 2025-02-25 PROCEDURE — 6360000002 HC RX W HCPCS: Performed by: STUDENT IN AN ORGANIZED HEALTH CARE EDUCATION/TRAINING PROGRAM

## 2025-02-25 PROCEDURE — 7100000011 HC PHASE II RECOVERY - ADDTL 15 MIN: Performed by: STUDENT IN AN ORGANIZED HEALTH CARE EDUCATION/TRAINING PROGRAM

## 2025-02-25 PROCEDURE — 62323 NJX INTERLAMINAR LMBR/SAC: CPT | Performed by: STUDENT IN AN ORGANIZED HEALTH CARE EDUCATION/TRAINING PROGRAM

## 2025-02-25 PROCEDURE — 3600000002 HC SURGERY LEVEL 2 BASE: Performed by: STUDENT IN AN ORGANIZED HEALTH CARE EDUCATION/TRAINING PROGRAM

## 2025-02-25 PROCEDURE — 7100000010 HC PHASE II RECOVERY - FIRST 15 MIN: Performed by: STUDENT IN AN ORGANIZED HEALTH CARE EDUCATION/TRAINING PROGRAM

## 2025-02-25 PROCEDURE — 2709999900 HC NON-CHARGEABLE SUPPLY: Performed by: STUDENT IN AN ORGANIZED HEALTH CARE EDUCATION/TRAINING PROGRAM

## 2025-02-25 PROCEDURE — 6360000004 HC RX CONTRAST MEDICATION: Performed by: STUDENT IN AN ORGANIZED HEALTH CARE EDUCATION/TRAINING PROGRAM

## 2025-02-25 RX ORDER — IOPAMIDOL 612 MG/ML
INJECTION, SOLUTION INTRATHECAL PRN
Status: DISCONTINUED | OUTPATIENT
Start: 2025-02-25 | End: 2025-02-25 | Stop reason: ALTCHOICE

## 2025-02-25 RX ORDER — LIDOCAINE HYDROCHLORIDE 5 MG/ML
INJECTION, SOLUTION INFILTRATION; INTRAVENOUS PRN
Status: DISCONTINUED | OUTPATIENT
Start: 2025-02-25 | End: 2025-02-25 | Stop reason: ALTCHOICE

## 2025-02-25 RX ORDER — PREGABALIN 25 MG/1
50 CAPSULE ORAL 2 TIMES DAILY
Qty: 124 CAPSULE | Refills: 0 | Status: SHIPPED | OUTPATIENT
Start: 2025-02-25 | End: 2025-03-28

## 2025-02-25 RX ORDER — METHYLPREDNISOLONE ACETATE 40 MG/ML
INJECTION, SUSPENSION INTRA-ARTICULAR; INTRALESIONAL; INTRAMUSCULAR; SOFT TISSUE PRN
Status: DISCONTINUED | OUTPATIENT
Start: 2025-02-25 | End: 2025-02-25 | Stop reason: ALTCHOICE

## 2025-02-25 RX ORDER — SODIUM CHLORIDE 9 MG/ML
INJECTION, SOLUTION INTRAVENOUS PRN
Status: DISCONTINUED | OUTPATIENT
Start: 2025-02-25 | End: 2025-02-25 | Stop reason: HOSPADM

## 2025-02-25 RX ORDER — SODIUM CHLORIDE 0.9 % (FLUSH) 0.9 %
5-40 SYRINGE (ML) INJECTION PRN
Status: DISCONTINUED | OUTPATIENT
Start: 2025-02-25 | End: 2025-02-25 | Stop reason: HOSPADM

## 2025-02-25 RX ORDER — SODIUM CHLORIDE 0.9 % (FLUSH) 0.9 %
5-40 SYRINGE (ML) INJECTION EVERY 12 HOURS SCHEDULED
Status: DISCONTINUED | OUTPATIENT
Start: 2025-02-25 | End: 2025-02-25 | Stop reason: HOSPADM

## 2025-02-25 ASSESSMENT — PAIN SCALES - GENERAL
PAINLEVEL_OUTOF10: 0
PAINLEVEL_OUTOF10: 0

## 2025-02-25 ASSESSMENT — PAIN - FUNCTIONAL ASSESSMENT: PAIN_FUNCTIONAL_ASSESSMENT: 0-10

## 2025-02-25 NOTE — OP NOTE
2025    Patient: Miles Marquez  :  1960  Age:  64 y.o.  Sex:  male     PRE-OPERATIVE DIAGNOSIS: Lumbar radiculopathy, Spinal stenosis with neurogenic claudication.    POST-OPERATIVE DIAGNOSIS: Same.    PROCEDURE: Fluoroscopic guided therapeutic lumbar epidural steroid injection at the L4/5 level (# 1).    SURGEON:  Kristy Liz MD    ANESTHESIA: Local    ESTIMATED BLOOD LOSS: None.  ______________________________________________________________________    BRIEF HISTORY:  Miles Marquez comes in today for first lumbar epidural injection at L4/5 level. The potential complications of this procedure were discussed with him again today.  He has elected to undergo the aforementioned procedure.    Miles’s complete History & Physical examination were reviewed in depth, a copy of which is in the chart.      DESCRIPTION OF PROCEDURE:    After confirming written and informed consent, a time-out was performed and Miles’s name and date of birth, the procedure to be performed as well as the plan for the location of the needle insertion were confirmed.    The patient was brought into the procedure room and placed in the prone position on the fluoroscopy table. A pillow was placed under the patient's lower abdomen/upper pelvis to increase lumbar interlaminar space. Standard monitors were placed, and vital signs were observed throughout the procedure. The area of the lumbar spine was prepped with chloraprep and draped in a sterile manner. The L4/5 interspace was identified and marked under AP fluoroscopy. The skin and subcutaneous tissues at the above level were anesthestized with 0.5% lidocaine. With intermittent fluoroscopy, an 18 gauge 3.5 \" tuohy epidural needle was inserted and directed toward the interlaminar space. The needle was slowly advanced using loss of resistance technique and 5 cc glass syringe  until the tip of the epidural needle has passed through the ligamentum flavum and entered the epidural space.

## 2025-02-25 NOTE — DISCHARGE INSTRUCTIONS
[facility] Pain Management Department  [place] [address]-[phone]  Dr. Saúl Wellington   Post-Pain Block/Radiofrequency  Home Going Instructions    1-Go home, rest for the remainder of the day  2-Please do not lift over 20 pounds the day of the injection  3-If you received sedation No: alcohol, driving, operating lawn mowers, plows, tractors or other dangerous equipment until next morning. Do not make important decisions or sign legal documents for 24 hours. You may experience light headedness, dizziness, nausea or sleepiness after sedation. Do not stay alone. A responsible adult must be with you for 24 hours. You could be nauseated from the medications you have received. Your IV site may be sore and bruised.    4-No dietary restrictions     5-Resume all medications the same day, blood thinners to be resumed 24 hours after injection if you were instructed to stop any.    6-Keep the surgical site clean and dry, you may shower the next morning and remove the      dressing.     7- No sitz baths, tub baths or hot tubs/swimming for 24 hours.       8- If you have any pain at the injection site(s), application of an ice pack to the area should be       helpful, 20 minutes on/20 minutes off for next 48 hours.  9- Call [facility] Pain Management immediately at if you develop.  Fever greater than 100.4 F  Have bleeding or drainage from the puncture site  Have progressive Leg/arm numbness and or weakness  Loss of control of bowel and or bladder (wet/soil yourself)  Severe headache with inability to lift head  10-You may return to work the next day

## 2025-03-24 ENCOUNTER — OFFICE VISIT (OUTPATIENT)
Dept: PAIN MANAGEMENT | Age: 65
End: 2025-03-24
Payer: MEDICARE

## 2025-03-24 VITALS
TEMPERATURE: 98.4 F | OXYGEN SATURATION: 94 % | WEIGHT: 258 LBS | BODY MASS INDEX: 36.94 KG/M2 | SYSTOLIC BLOOD PRESSURE: 133 MMHG | DIASTOLIC BLOOD PRESSURE: 80 MMHG | HEIGHT: 70 IN | HEART RATE: 70 BPM

## 2025-03-24 DIAGNOSIS — R20.2 PARESTHESIA OF LEFT LEG: ICD-10-CM

## 2025-03-24 DIAGNOSIS — M54.16 LUMBAR RADICULOPATHY: Primary | ICD-10-CM

## 2025-03-24 DIAGNOSIS — M47.816 LUMBAR SPONDYLOSIS: ICD-10-CM

## 2025-03-24 DIAGNOSIS — M48.061 NEUROFORAMINAL STENOSIS OF LUMBAR SPINE: ICD-10-CM

## 2025-03-24 PROCEDURE — 99214 OFFICE O/P EST MOD 30 MIN: CPT | Performed by: STUDENT IN AN ORGANIZED HEALTH CARE EDUCATION/TRAINING PROGRAM

## 2025-03-24 PROCEDURE — 1123F ACP DISCUSS/DSCN MKR DOCD: CPT | Performed by: STUDENT IN AN ORGANIZED HEALTH CARE EDUCATION/TRAINING PROGRAM

## 2025-03-24 PROCEDURE — 1160F RVW MEDS BY RX/DR IN RCRD: CPT | Performed by: STUDENT IN AN ORGANIZED HEALTH CARE EDUCATION/TRAINING PROGRAM

## 2025-03-24 PROCEDURE — 1159F MED LIST DOCD IN RCRD: CPT | Performed by: STUDENT IN AN ORGANIZED HEALTH CARE EDUCATION/TRAINING PROGRAM

## 2025-03-24 RX ORDER — PREGABALIN 50 MG/1
50 CAPSULE ORAL 2 TIMES DAILY
Qty: 120 CAPSULE | Refills: 0 | Status: SHIPPED | OUTPATIENT
Start: 2025-03-24 | End: 2025-05-23

## 2025-03-24 NOTE — PROGRESS NOTES
[facility] - Pain Medicine  [address] [place] [address]    Pain Medicine Follow Up Note      Miles Marquez     Date of Visit:  3/24/2025    CC:  Patient presents for follow up   Chief Complaint   Patient presents with    Follow-up     Epidural follow up        HPI:    Pain is better.  Medication side effects:none.   Recent interventional procedures: SHIRLEY - excellent  Blood Thinners/Anticoagulation: no  Herbal Supplements: no  Pertinent Allergies: no  Diabetic: no  Bowel/Bladder Incontinence: no    Previous Plan:  HEP  SHIRLEY - done  Gabapentin - now OFF  Lyrica - now at 50 mg BID with good relief      Interval Changes:  Has done well increased dose of Lyrica  No sig s/e from lyrica  Great relief of low back and left lower extremity symptoms with SHIRLEY  Still has occasional symptoms near left knee  Some depression symptoms, on wellbutrin      Procedures:    2/25/25 - L4/5 SHIRLEY - 80% relief for 1 month     Previous Treatments:   Tylenol, gabapentin, duloxetine, Requip, physical therapy.    Potential Aberrant Drug-Related Behavior:  no      Imaging/Studies:     XRAY:  XR LUMBAR SPINE (2-3 VIEWS) 01/10/2023   Mild multilevel degenerative disc disease and degenerative facet arthropathy  at L5-S1.  No fracture or dislocation.  Normal soft tissues.     Impression  Mild multilevel degenerative spondylosis.        XR KNEE RIGHT (3 VIEWS) 05/03/2022   Mild patellar spurring.        XR KNEE LEFT (3 VIEWS) 05/03/2022   Unremarkable left knee.    MRI:  MRI LUMBAR SPINE WO CONTRAST 01/13/2025    Narrative  EXAMINATION:  MRI OF THE LUMBAR SPINE WITHOUT CONTRAST, 1/13/2025 3:05 pm    TECHNIQUE:  Multiplanar multisequence MRI of the lumbar spine was performed without the  administration of intravenous contrast.    COMPARISON:  None.    HISTORY:  ORDERING SYSTEM PROVIDED HISTORY: Lumbar spondylosis    FINDINGS:  BONES/ALIGNMENT: There is normal alignment of the spine. The vertebral body  heights are

## 2025-03-24 NOTE — PROGRESS NOTES
Miles Marquez presents to the [facility] on 3/24/2025. Miles is complaining of pain left knee. The pain is intermittent. The pain is described as aching and stabbing. Pain is rated on his best day at a 1, on his worst day at a 8, and on average at a 4 on the VAS scale. He took his last dose of Lyrica and Aleve.  Last dose 03/24 morning.     Any procedures since your last visit: Yes, with 80 % relief.    Pacemaker or defibrillator: No.    He is  on NSAIDS and is not on anticoagulation medications to include naproxen and is managed by Kashif Armas MD     Do you want someone present when the provider examines you? No    Medication Contract and Consent for Opioid Use Documents Filed        No documents found                    /80   Pulse 70   Temp 98.4 °F (36.9 °C)   Ht 1.778 m (5' 10\")   Wt 117 kg (258 lb)   SpO2 94%   BMI 37.02 kg/m²      No LMP for male patient.

## 2025-04-07 ENCOUNTER — PROCEDURE VISIT (OUTPATIENT)
Dept: PODIATRY | Age: 65
End: 2025-04-07
Payer: MEDICARE

## 2025-04-07 VITALS
HEART RATE: 74 BPM | SYSTOLIC BLOOD PRESSURE: 132 MMHG | BODY MASS INDEX: 37.02 KG/M2 | WEIGHT: 258 LBS | TEMPERATURE: 97.9 F | DIASTOLIC BLOOD PRESSURE: 83 MMHG | OXYGEN SATURATION: 97 %

## 2025-04-07 DIAGNOSIS — B35.1 TINEA UNGUIUM: ICD-10-CM

## 2025-04-07 DIAGNOSIS — R20.2 PARESTHESIA: ICD-10-CM

## 2025-04-07 DIAGNOSIS — G25.81 RESTLESS LEG SYNDROME: ICD-10-CM

## 2025-04-07 DIAGNOSIS — M79.2 NEURITIS: Primary | ICD-10-CM

## 2025-04-07 PROCEDURE — 1123F ACP DISCUSS/DSCN MKR DOCD: CPT | Performed by: PODIATRIST

## 2025-04-07 PROCEDURE — 1159F MED LIST DOCD IN RCRD: CPT | Performed by: PODIATRIST

## 2025-04-07 PROCEDURE — 99213 OFFICE O/P EST LOW 20 MIN: CPT | Performed by: PODIATRIST

## 2025-04-07 RX ORDER — ROPINIROLE 0.25 MG/1
0.25 TABLET, FILM COATED ORAL NIGHTLY
Qty: 90 TABLET | Refills: 0 | Status: SHIPPED | OUTPATIENT
Start: 2025-04-07 | End: 2025-07-06

## 2025-04-07 RX ORDER — OMEPRAZOLE 20 MG/1
20 CAPSULE, DELAYED RELEASE ORAL DAILY
Qty: 30 CAPSULE | Refills: 12 | Status: SHIPPED | OUTPATIENT
Start: 2025-04-07

## 2025-04-07 ASSESSMENT — ENCOUNTER SYMPTOMS: BACK PAIN: 1

## 2025-04-07 NOTE — PROGRESS NOTES
Miles Marquez : 1960 Sex: male  Age: 64 y.o.    Patient was referred by Kashif Armas MD    Chief Complaint   Patient presents with    Toe Pain     Kashif Armas MD  3/24/25       SUBJECTIVE patient is seen today for fungus toenails on the right side restless leg syndrome and neuropathy.on wellbutrin  off cymbalta  off gabapentin on lyrica     Toe Pain     Foot Pain     Diabetes      Review of Systems   Musculoskeletal:  Positive for arthralgias and back pain.     Const: Denies constitutional symptoms  Musculo: Denies symptoms other than stated above  Skin: Denies symptoms other than stated above       Current Outpatient Medications:     omeprazole (PRILOSEC) 20 MG delayed release capsule, Take 1 capsule by mouth daily, Disp: 30 capsule, Rfl: 12    rOPINIRole (REQUIP) 0.25 MG tablet, Take 1 tablet by mouth nightly, Disp: 90 tablet, Rfl: 0    pregabalin (LYRICA) 50 MG capsule, Take 1 capsule by mouth 2 times daily for 60 days. Max Daily Amount: 100 mg, Disp: 120 capsule, Rfl: 0    naproxen sodium (ALEVE) 220 MG tablet, Take 1 tablet by mouth as needed for Pain, Disp: , Rfl:     vitamin D (VITAMIN D3) 50 MCG (2000 UT) CAPS capsule, Take 2 capsules by mouth daily, Disp: , Rfl:     buPROPion (WELLBUTRIN XL) 300 MG extended release tablet, Take 1 tablet by mouth every morning, Disp: , Rfl:     tamsulosin (FLOMAX) 0.4 MG capsule, Take 1 capsule by mouth daily, Disp: 30 capsule, Rfl: 2    tadalafil (CIALIS) 5 MG tablet, Take 1 tablet by mouth daily, Disp: , Rfl:   No Known Allergies    Past Medical History:   Diagnosis Date    Disorder of prostate     Dysthymic disorder     GERD (gastroesophageal reflux disease)     Lumbar pain     Migraine     Restless leg     Tinea unguium     Vitamin D deficiency      Past Surgical History:   Procedure Laterality Date    CHOLECYSTECTOMY, LAPAROSCOPIC      COLONOSCOPY      HEEL SPUR SURGERY Left     HERNIA REPAIR      PAIN MANAGEMENT PROCEDURE Left 2025

## 2025-06-16 ENCOUNTER — PROCEDURE VISIT (OUTPATIENT)
Dept: PODIATRY | Age: 65
End: 2025-06-16

## 2025-06-16 VITALS
WEIGHT: 258 LBS | TEMPERATURE: 97.6 F | DIASTOLIC BLOOD PRESSURE: 85 MMHG | BODY MASS INDEX: 37.02 KG/M2 | SYSTOLIC BLOOD PRESSURE: 144 MMHG

## 2025-06-16 DIAGNOSIS — M47.816 LUMBAR SPONDYLOSIS: ICD-10-CM

## 2025-06-16 DIAGNOSIS — G25.81 RESTLESS LEG SYNDROME: ICD-10-CM

## 2025-06-16 DIAGNOSIS — B35.1 TINEA UNGUIUM: ICD-10-CM

## 2025-06-16 DIAGNOSIS — R20.2 PARESTHESIA: ICD-10-CM

## 2025-06-16 DIAGNOSIS — M54.16 LUMBAR RADICULOPATHY: ICD-10-CM

## 2025-06-16 DIAGNOSIS — M79.2 NEURITIS: Primary | ICD-10-CM

## 2025-06-16 DIAGNOSIS — M48.061 NEUROFORAMINAL STENOSIS OF LUMBAR SPINE: ICD-10-CM

## 2025-06-16 RX ORDER — PREGABALIN 50 MG/1
50 CAPSULE ORAL 2 TIMES DAILY
Qty: 120 CAPSULE | Refills: 0 | Status: SHIPPED | OUTPATIENT
Start: 2025-06-16 | End: 2025-08-15

## 2025-06-16 ASSESSMENT — ENCOUNTER SYMPTOMS: BACK PAIN: 1

## 2025-06-16 NOTE — PROGRESS NOTES
Miles Marquez : 1960 Sex: male  Age: 64 y.o.    Patient was referred by Kashif Armas MD    Chief Complaint   Patient presents with    Nail Problem    Toe Pain     Kashif Armas MD  LOV 3/25/25       SUBJECTIVE patient is seen today for fungus toenails on the right side restless leg syndrome and neuropathy.on wellbutrin  off cymbalta  off gabapentin on lyrica     Toe Pain     Foot Pain     Diabetes      Review of Systems   Musculoskeletal:  Positive for arthralgias and back pain.     Const: Denies constitutional symptoms  Musculo: Denies symptoms other than stated above  Skin: Denies symptoms other than stated above       Current Outpatient Medications:     pregabalin (LYRICA) 50 MG capsule, Take 1 capsule by mouth 2 times daily for 60 days. Max Daily Amount: 100 mg, Disp: 120 capsule, Rfl: 0    omeprazole (PRILOSEC) 20 MG delayed release capsule, Take 1 capsule by mouth daily, Disp: 30 capsule, Rfl: 12    naproxen sodium (ALEVE) 220 MG tablet, Take 1 tablet by mouth as needed for Pain, Disp: , Rfl:     vitamin D (VITAMIN D3) 50 MCG (2000) CAPS capsule, Take 2 capsules by mouth daily, Disp: , Rfl:     buPROPion (WELLBUTRIN XL) 300 MG extended release tablet, Take 1 tablet by mouth every morning, Disp: , Rfl:     tamsulosin (FLOMAX) 0.4 MG capsule, Take 1 capsule by mouth daily, Disp: 30 capsule, Rfl: 2    tadalafil (CIALIS) 5 MG tablet, Take 1 tablet by mouth daily, Disp: , Rfl:     rOPINIRole (REQUIP) 0.25 MG tablet, Take 1 tablet by mouth nightly (Patient not taking: Reported on 2025), Disp: 90 tablet, Rfl: 0  No Known Allergies    Past Medical History:   Diagnosis Date    Disorder of prostate     Dysthymic disorder     GERD (gastroesophageal reflux disease)     Lumbar pain     Migraine     Restless leg     Tinea unguium     Vitamin D deficiency      Past Surgical History:   Procedure Laterality Date    CHOLECYSTECTOMY, LAPAROSCOPIC      COLONOSCOPY      HEEL SPUR SURGERY Left

## 2025-07-01 ENCOUNTER — OFFICE VISIT (OUTPATIENT)
Dept: PAIN MANAGEMENT | Age: 65
End: 2025-07-01
Payer: MEDICARE

## 2025-07-01 VITALS
BODY MASS INDEX: 36.94 KG/M2 | OXYGEN SATURATION: 98 % | TEMPERATURE: 97.4 F | SYSTOLIC BLOOD PRESSURE: 120 MMHG | DIASTOLIC BLOOD PRESSURE: 76 MMHG | HEART RATE: 64 BPM | HEIGHT: 70 IN | WEIGHT: 258 LBS

## 2025-07-01 DIAGNOSIS — M47.816 LUMBAR SPONDYLOSIS: ICD-10-CM

## 2025-07-01 DIAGNOSIS — M54.16 LUMBAR RADICULOPATHY: ICD-10-CM

## 2025-07-01 DIAGNOSIS — M48.061 NEUROFORAMINAL STENOSIS OF LUMBAR SPINE: ICD-10-CM

## 2025-07-01 DIAGNOSIS — M25.561 ACUTE PAIN OF RIGHT KNEE: Primary | ICD-10-CM

## 2025-07-01 PROCEDURE — 99214 OFFICE O/P EST MOD 30 MIN: CPT | Performed by: STUDENT IN AN ORGANIZED HEALTH CARE EDUCATION/TRAINING PROGRAM

## 2025-07-01 PROCEDURE — 1123F ACP DISCUSS/DSCN MKR DOCD: CPT | Performed by: STUDENT IN AN ORGANIZED HEALTH CARE EDUCATION/TRAINING PROGRAM

## 2025-07-01 RX ORDER — PREGABALIN 50 MG/1
50 CAPSULE ORAL 2 TIMES DAILY
Qty: 180 CAPSULE | Refills: 0 | Status: SHIPPED | OUTPATIENT
Start: 2025-07-01 | End: 2025-09-29

## 2025-07-01 NOTE — PROGRESS NOTES
[facility] - Pain Medicine  [address] [place] [address]    Pain Medicine Follow Up Note      Miles Marquez     Date of Visit:  7/1/2025    CC:  Patient presents for follow up   Chief Complaint   Patient presents with    Follow-up     Lower back pain       HPI:    Pain is worse.  Medication side effects:none.   Recent interventional procedures: no new  Blood Thinners/Anticoagulation: no  Herbal Supplements: no  Pertinent Allergies: no  Diabetic: no  Bowel/Bladder Incontinence: no    Previous Plan:  HEP   Lyrica -   at 50 mg BID with good relief      Interval Changes:  [place]  Hit his knees  Has treated conservative at home  States \"Feels like a hematoma\" for the past month or so    Procedures:    2/25/25 - L4/5 SHIRLEY - 80% relief for 1 -2 weeks      Previous Treatments:   Tylenol, gabapentin, duloxetine, Requip, physical therapy.    Potential Aberrant Drug-Related Behavior:  no      Imaging/Studies:     XRAY:  XR LUMBAR SPINE (2-3 VIEWS) 01/10/2023   Mild multilevel degenerative disc disease and degenerative facet arthropathy  at L5-S1.  No fracture or dislocation.  Normal soft tissues.     Impression  Mild multilevel degenerative spondylosis.        XR KNEE RIGHT (3 VIEWS) 05/03/2022   Mild patellar spurring.        XR KNEE LEFT (3 VIEWS) 05/03/2022   Unremarkable left knee.    MRI:  MRI LUMBAR SPINE WO CONTRAST 01/13/2025    Narrative  EXAMINATION:  MRI OF THE LUMBAR SPINE WITHOUT CONTRAST, 1/13/2025 3:05 pm    TECHNIQUE:  Multiplanar multisequence MRI of the lumbar spine was performed without the  administration of intravenous contrast.    COMPARISON:  None.    HISTORY:  ORDERING SYSTEM PROVIDED HISTORY: Lumbar spondylosis    FINDINGS:  BONES/ALIGNMENT: There is normal alignment of the spine. The vertebral body  heights are maintained. The bone marrow signal appears unremarkable.    SPINAL CORD: The conus terminates normally.    SOFT TISSUES: No

## 2025-07-01 NOTE — PROGRESS NOTES
Miles Marquez presents to the [facility] on 7/1/2025. Miles is complaining of pain in his lower back. The pain is intermittent. The pain is described as aching and stabbing. Pain is rated on his best day at a 8, on his worst day at a 8, and on average at a 8 on the VAS scale. He took his last dose of Lyrica and Aleve 07/01.      Any procedures since your last visit: No    He is  on NSAIDS and  is not on anticoagulation medications     Pacemaker or defibrillator: No     Do you want someone present when the provider examines you? No    Medication Contract and Consent for Opioid Use Documents Filed        No documents found                       /76   Pulse 64   Temp 97.4 °F (36.3 °C)   Ht 1.778 m (5' 10\")   Wt 117 kg (258 lb)   SpO2 98%   BMI 37.02 kg/m²      No LMP for male patient.

## (undated) DEVICE — 3M™ RED DOT™ MONITORING ELECTRODE WITH FOAM TAPE AND STICKY GEL 2560, 50/BAG, 20/CASE, 72/PLT: Brand: RED DOT™

## (undated) DEVICE — 12 ML SYRINGE,LUER-LOCK TIP: Brand: MONOJECT

## (undated) DEVICE — SYRINGE MED 5ML STD CLR PLAS LUERLOCK TIP N CTRL DISP

## (undated) DEVICE — NON-DEHP CATHETER EXTENSION SET, MALE LUER LOCK ADAPTER

## (undated) DEVICE — Device: Brand: PORTEX

## (undated) DEVICE — NEEDLE HYPO 18GA L1.5IN PNK POLYPR HUB S STL REG BVL STR

## (undated) DEVICE — BANDAGE ADH W1XL3IN NAT FAB WVN FLX DURABLE N ADH PD SEAL

## (undated) DEVICE — 6 ML SYRINGE LUER-LOCK TIP: Brand: MONOJECT

## (undated) DEVICE — GAUZE,SPONGE,4"X4",8PLY,STRL,LF,10/TRAY: Brand: MEDLINE

## (undated) DEVICE — NEEDLE HYPO 25GA L1.5IN BLU POLYPR HUB S STL REG BVL STR

## (undated) DEVICE — GLOVE ORANGE PI 7 1/2   MSG9075